# Patient Record
Sex: MALE | Race: WHITE | Employment: OTHER | URBAN - METROPOLITAN AREA
[De-identification: names, ages, dates, MRNs, and addresses within clinical notes are randomized per-mention and may not be internally consistent; named-entity substitution may affect disease eponyms.]

---

## 2019-06-02 ENCOUNTER — HOSPITAL ENCOUNTER (EMERGENCY)
Age: 69
Discharge: HOME | End: 2019-06-02
Attending: EMERGENCY MEDICINE
Payer: MEDICARE

## 2019-06-02 ENCOUNTER — APPOINTMENT (OUTPATIENT)
Dept: CT IMAGING | Age: 69
End: 2019-06-02
Payer: MEDICARE

## 2019-06-02 VITALS
RESPIRATION RATE: 18 BRPM | OXYGEN SATURATION: 98 % | DIASTOLIC BLOOD PRESSURE: 74 MMHG | SYSTOLIC BLOOD PRESSURE: 110 MMHG | HEART RATE: 74 BPM | TEMPERATURE: 98.3 F

## 2019-06-02 DIAGNOSIS — N17.9 AKI (ACUTE KIDNEY INJURY) (HCC): ICD-10-CM

## 2019-06-02 DIAGNOSIS — K40.30 INCARCERATED RIGHT INGUINAL HERNIA: Primary | ICD-10-CM

## 2019-06-02 DIAGNOSIS — E87.6 HYPOKALEMIA: ICD-10-CM

## 2019-06-02 LAB
ANION GAP SERPL CALCULATED.3IONS-SCNC: 16 MMOL/L (ref 3–16)
ANION GAP SERPL CALCULATED.3IONS-SCNC: 21 MMOL/L (ref 3–16)
BACTERIA: ABNORMAL /HPF
BASOPHILS ABSOLUTE: 0 K/UL (ref 0–0.2)
BASOPHILS RELATIVE PERCENT: 0.2 %
BILIRUBIN URINE: ABNORMAL
BLOOD, URINE: ABNORMAL
BUN BLDV-MCNC: 57 MG/DL (ref 7–20)
BUN BLDV-MCNC: 62 MG/DL (ref 7–20)
CALCIUM SERPL-MCNC: 8.6 MG/DL (ref 8.3–10.6)
CALCIUM SERPL-MCNC: 9.7 MG/DL (ref 8.3–10.6)
CASTS: ABNORMAL /LPF
CHLORIDE BLD-SCNC: 84 MMOL/L (ref 99–110)
CHLORIDE BLD-SCNC: 86 MMOL/L (ref 99–110)
CLARITY: CLEAR
CO2: 30 MMOL/L (ref 21–32)
CO2: 30 MMOL/L (ref 21–32)
COLOR: YELLOW
CREAT SERPL-MCNC: 1.7 MG/DL (ref 0.8–1.3)
CREAT SERPL-MCNC: 2.1 MG/DL (ref 0.8–1.3)
EOSINOPHILS ABSOLUTE: 0 K/UL (ref 0–0.6)
EOSINOPHILS RELATIVE PERCENT: 0.1 %
EPITHELIAL CELLS, UA: ABNORMAL /HPF
GFR AFRICAN AMERICAN: 38
GFR AFRICAN AMERICAN: 48
GFR NON-AFRICAN AMERICAN: 31
GFR NON-AFRICAN AMERICAN: 40
GLUCOSE BLD-MCNC: 128 MG/DL (ref 70–99)
GLUCOSE BLD-MCNC: 151 MG/DL (ref 70–99)
GLUCOSE URINE: NEGATIVE MG/DL
HCT VFR BLD CALC: 45.4 % (ref 40.5–52.5)
HEMOGLOBIN: 15.2 G/DL (ref 13.5–17.5)
KETONES, URINE: 15 MG/DL
LACTATE: 1.15 MMOL/L (ref 0.4–2)
LACTIC ACID: NORMAL MMOL/L (ref 0.4–2)
LEUKOCYTE ESTERASE, URINE: NEGATIVE
LYMPHOCYTES ABSOLUTE: 1.6 K/UL (ref 1–5.1)
LYMPHOCYTES RELATIVE PERCENT: 15.8 %
MCH RBC QN AUTO: 29.1 PG (ref 26–34)
MCHC RBC AUTO-ENTMCNC: 33.4 G/DL (ref 31–36)
MCV RBC AUTO: 87 FL (ref 80–100)
MICROSCOPIC EXAMINATION: YES
MONOCYTES ABSOLUTE: 1 K/UL (ref 0–1.3)
MONOCYTES RELATIVE PERCENT: 10.5 %
NEUTROPHILS ABSOLUTE: 7.3 K/UL (ref 1.7–7.7)
NEUTROPHILS RELATIVE PERCENT: 73.4 %
NITRITE, URINE: NEGATIVE
PDW BLD-RTO: 14.6 % (ref 12.4–15.4)
PERFORMED ON: NORMAL
PH UA: 5.5 (ref 5–8)
PLATELET # BLD: 487 K/UL (ref 135–450)
PMV BLD AUTO: 7.5 FL (ref 5–10.5)
POC SAMPLE TYPE: NORMAL
POTASSIUM SERPL-SCNC: 3 MMOL/L (ref 3.5–5.1)
POTASSIUM SERPL-SCNC: 3.1 MMOL/L (ref 3.5–5.1)
PROTEIN UA: 30 MG/DL
RBC # BLD: 5.21 M/UL (ref 4.2–5.9)
RBC UA: ABNORMAL /HPF (ref 0–2)
SODIUM BLD-SCNC: 132 MMOL/L (ref 136–145)
SODIUM BLD-SCNC: 135 MMOL/L (ref 136–145)
SPECIFIC GRAVITY UA: 1.02 (ref 1–1.03)
URINE TYPE: ABNORMAL
UROBILINOGEN, URINE: 0.2 E.U./DL
WBC # BLD: 9.9 K/UL (ref 4–11)
WBC UA: ABNORMAL /HPF (ref 0–5)

## 2019-06-02 PROCEDURE — 96374 THER/PROPH/DIAG INJ IV PUSH: CPT

## 2019-06-02 PROCEDURE — 80048 BASIC METABOLIC PNL TOTAL CA: CPT

## 2019-06-02 PROCEDURE — 81001 URINALYSIS AUTO W/SCOPE: CPT

## 2019-06-02 PROCEDURE — 74176 CT ABD & PELVIS W/O CONTRAST: CPT

## 2019-06-02 PROCEDURE — 6360000002 HC RX W HCPCS: Performed by: EMERGENCY MEDICINE

## 2019-06-02 PROCEDURE — 2580000003 HC RX 258: Performed by: EMERGENCY MEDICINE

## 2019-06-02 PROCEDURE — 85025 COMPLETE CBC W/AUTO DIFF WBC: CPT

## 2019-06-02 PROCEDURE — 99283 EMERGENCY DEPT VISIT LOW MDM: CPT

## 2019-06-02 PROCEDURE — 83605 ASSAY OF LACTIC ACID: CPT

## 2019-06-02 PROCEDURE — 6370000000 HC RX 637 (ALT 250 FOR IP): Performed by: EMERGENCY MEDICINE

## 2019-06-02 PROCEDURE — 6360000004 HC RX CONTRAST MEDICATION: Performed by: EMERGENCY MEDICINE

## 2019-06-02 RX ORDER — 0.9 % SODIUM CHLORIDE 0.9 %
500 INTRAVENOUS SOLUTION INTRAVENOUS ONCE
Status: COMPLETED | OUTPATIENT
Start: 2019-06-02 | End: 2019-06-02

## 2019-06-02 RX ORDER — MORPHINE SULFATE 4 MG/ML
4 INJECTION, SOLUTION INTRAMUSCULAR; INTRAVENOUS EVERY 10 MIN PRN
Status: DISCONTINUED | OUTPATIENT
Start: 2019-06-02 | End: 2019-06-02 | Stop reason: HOSPADM

## 2019-06-02 RX ORDER — POTASSIUM CHLORIDE 1.5 G/1.77G
40 POWDER, FOR SOLUTION ORAL 2 TIMES DAILY
Status: DISCONTINUED | OUTPATIENT
Start: 2019-06-02 | End: 2019-06-02 | Stop reason: HOSPADM

## 2019-06-02 RX ORDER — ONDANSETRON 2 MG/ML
4 INJECTION INTRAMUSCULAR; INTRAVENOUS
Status: COMPLETED | OUTPATIENT
Start: 2019-06-02 | End: 2019-06-02

## 2019-06-02 RX ADMIN — IOHEXOL 50 ML: 240 INJECTION, SOLUTION INTRATHECAL; INTRAVASCULAR; INTRAVENOUS; ORAL at 14:26

## 2019-06-02 RX ADMIN — ONDANSETRON 4 MG: 2 INJECTION INTRAMUSCULAR; INTRAVENOUS at 13:02

## 2019-06-02 RX ADMIN — POTASSIUM CHLORIDE 40 MEQ: 1.5 POWDER, FOR SOLUTION ORAL at 16:18

## 2019-06-02 RX ADMIN — SODIUM CHLORIDE 500 ML: 9 INJECTION, SOLUTION INTRAVENOUS at 13:02

## 2019-06-02 ASSESSMENT — ENCOUNTER SYMPTOMS
DIARRHEA: 0
BACK PAIN: 0
ABDOMINAL PAIN: 0
NAUSEA: 1
VOMITING: 1
SHORTNESS OF BREATH: 0
COUGH: 0
RHINORRHEA: 0

## 2019-06-02 NOTE — ED PROVIDER NOTES
0.1 %    Basophils % 0.2 %    Neutrophils # 7.3 1.7 - 7.7 K/uL    Lymphocytes # 1.6 1.0 - 5.1 K/uL    Monocytes # 1.0 0.0 - 1.3 K/uL    Eosinophils # 0.0 0.0 - 0.6 K/uL    Basophils # 0.0 0.0 - 0.2 K/uL   Basic Metabolic Panel   Result Value Ref Range    Sodium 135 (L) 136 - 145 mmol/L    Potassium 3.1 (L) 3.5 - 5.1 mmol/L    Chloride 84 (L) 99 - 110 mmol/L    CO2 30 21 - 32 mmol/L    Anion Gap 21 (H) 3 - 16    Glucose 151 (H) 70 - 99 mg/dL    BUN 62 (H) 7 - 20 mg/dL    CREATININE 2.1 (H) 0.8 - 1.3 mg/dL    GFR Non- 31 (A) >60    GFR  38 (A) >60    Calcium 9.7 8.3 - 10.6 mg/dL   Lactate, plasma   Result Value Ref Range    Lactic Acid see below 0.4 - 2.0 mmol/L   Urinalysis, reflex to microscopic (Lab)   Result Value Ref Range    Color, UA Yellow Straw/Yellow    Clarity, UA Clear Clear    Glucose, Ur Negative Negative mg/dL    Bilirubin Urine SMALL (A) Negative    Ketones, Urine 15 (A) Negative mg/dL    Specific Gravity, UA 1.025 1.005 - 1.030    Blood, Urine SMALL (A) Negative    pH, UA 5.5 5.0 - 8.0    Protein, UA 30 (A) Negative mg/dL    Urobilinogen, Urine 0.2 <2.0 E.U./dL    Nitrite, Urine Negative Negative    Leukocyte Esterase, Urine Negative Negative    Microscopic Examination YES     Urine Type Voided    Microscopic Urinalysis   Result Value Ref Range    Casts 3-5 Hyaline (A) /LPF    WBC, UA 3-5 0 - 5 /HPF    RBC, UA 3-5 (A) 0 - 2 /HPF    Epi Cells 0-2 /HPF    Bacteria, UA 2+ (A) /HPF   Basic Metabolic Panel   Result Value Ref Range    Sodium 132 (L) 136 - 145 mmol/L    Potassium 3.0 (L) 3.5 - 5.1 mmol/L    Chloride 86 (L) 99 - 110 mmol/L    CO2 30 21 - 32 mmol/L    Anion Gap 16 3 - 16    Glucose 128 (H) 70 - 99 mg/dL    BUN 57 (H) 7 - 20 mg/dL    CREATININE 1.7 (H) 0.8 - 1.3 mg/dL    GFR Non- 40 (A) >60    GFR  48 (A) >60    Calcium 8.6 8.3 - 10.6 mg/dL   POCT Venous   Result Value Ref Range    Lactate 1.15 0.40 - 2.00 mmol/L    Sample Type AFIA Performed on SEE BELOW        ED BEDSIDE ULTRASOUND:  N/A    RECENT VITALS:  BP: 123/73, Temp: 98.3 °F (36.8 °C), Pulse: 79, Resp: 20, SpO2: 99 %     Procedures     Hernia reduction as below    [address] / DEON[place] / Roxanne Umm is a 78 y.o. male with a history of R inguinal hernia who presents with groin pain/swelling. Symptoms and exam most consistent with incarcerated inguinal hernia. Other considerations include strangulation and SBO. Doubt other emergent condition. Plan: Labs as below, possible CT A/P, pain control, reduction attempt    ED Course     Nursing Notes, Past Medical Hx, Past Surgical Hx, Social Hx, Allergies, and Family Hx were reviewed. The patient was given the followingmedications:  Orders Placed This Encounter   Medications    morphine injection 4 mg    0.9 % sodium chloride bolus    ondansetron (ZOFRAN) injection 4 mg    iohexol (OMNIPAQUE 240) injection 50 mL    potassium chloride (KLOR-CON) packet 40 mEq       CONSULTS:  None    ED Course as of Jun 02 1548   Sun Jun 02, 2019   1138 Hernia reduced with gentle pressure after ice/Trendelenburg    [AZ]   1204 CBC unremarkable    [AZ]   1216 AQUILINO vs. undiagnosed CKD, if acute likely prerenal from vomiting   Creatinine(!): 2.1 [AZ]   1241 Neg   Lactate, ser/plas: 1.15 [AZ]   1450 Bilateral internal hernias right greater than left containing fat and fluid without bowel herniation/strangulation. Mild diverticulosis. Small hiatal hernia. Prostatomegaly. CT ABDOMEN PELVIS WO CONTRAST Additional Contrast? Oral [AZ]   1547 Improving   Creatinine(!): 1.7 [AZ]   1547 Will replete   Potassium(!): 3.0 [AZ]   1548 Patient remains well-appearing. Suspect incarcerated R inguinal hernia s/p reduction without e/o strangulation. Also with AQUILINO 2/2 dehydration. Will discharge with PCP/surgery follow-up.  Verbal and written discharge instructions given to and understood by patient.    Niki Wesley      ED Course User Index  [AZ] Delfino Wolf MD       Clinical Impression     1. Incarcerated right inguinal hernia    2. AQUILINO (acute kidney injury) ([facility] [address]    3.  Hypokalemia        Disposition     PATIENT REFERRED TO:  Nghia Beltran  [facility]  [phone]    Schedule an appointment as soon as possible for a visit in 1 week      The [facility] Emergency Department  [address] [place] [address]  [phone]  Go to   If symptoms worsen      DISCHARGEMEDICATIONS:  New Prescriptions    No medications on file       DISPOSITION       Delfino Wolf MD  06/02/19 1948

## 2019-06-02 NOTE — ED TRIAGE NOTES
Pt sent to ED by urgent care for hernia evaluation. Pt states the hernia started causing issues 4 days ago.

## 2019-06-02 NOTE — ED NOTES
Attempted to urinate, unable. States that he hasn't been able to keep anything down the last couple days.   Dr. Garay Figures notified and NS bolus started     Divya Lynch RN  06/02/19 6621

## 2019-06-10 ENCOUNTER — OFFICE VISIT (OUTPATIENT)
Dept: INTERNAL MEDICINE CLINIC | Age: 69
End: 2019-06-10
Payer: MEDICARE

## 2019-06-10 VITALS
RESPIRATION RATE: 20 BRPM | TEMPERATURE: 99 F | WEIGHT: 154 LBS | BODY MASS INDEX: 30.23 KG/M2 | HEART RATE: 66 BPM | HEIGHT: 60 IN | OXYGEN SATURATION: 99 % | SYSTOLIC BLOOD PRESSURE: 152 MMHG | DIASTOLIC BLOOD PRESSURE: 77 MMHG

## 2019-06-10 VITALS
TEMPERATURE: 99 F | DIASTOLIC BLOOD PRESSURE: 77 MMHG | OXYGEN SATURATION: 99 % | HEIGHT: 66 IN | SYSTOLIC BLOOD PRESSURE: 152 MMHG | BODY MASS INDEX: 24.78 KG/M2 | HEART RATE: 66 BPM | WEIGHT: 154.2 LBS | RESPIRATION RATE: 20 BRPM

## 2019-06-10 DIAGNOSIS — R79.89 ELEVATED SERUM CREATININE: Primary | ICD-10-CM

## 2019-06-10 DIAGNOSIS — Z01.818 PRE-OP EVALUATION: Primary | ICD-10-CM

## 2019-06-10 PROCEDURE — 99213 OFFICE O/P EST LOW 20 MIN: CPT | Performed by: STUDENT IN AN ORGANIZED HEALTH CARE EDUCATION/TRAINING PROGRAM

## 2019-06-10 ASSESSMENT — ENCOUNTER SYMPTOMS
VOMITING: 0
NAUSEA: 0
ABDOMINAL PAIN: 0
SINUS PAIN: 0
CHEST TIGHTNESS: 0
SINUS PRESSURE: 0
SORE THROAT: 0
SHORTNESS OF BREATH: 0
PHOTOPHOBIA: 0
BACK PAIN: 0

## 2019-06-10 NOTE — PROGRESS NOTES
Department of General Surgery - Adult  General Surgery Service  Resident Clinic Note      CC:  Bilateral inguinal hernia    History Obtained From:  patient    HISTORY OF PRESENT ILLNESS:  This is a 78 y.o. male with Hx of asthma and recently diagnosed bilateral inguinal hernias presenting to clinic for further evaluation and management of his hernias. Patient says that his hernias have been present for nearly 20 years. It has not really bothered him until a recent episode of possible obstruction/incarceration nearly 2 weeks ago. He says that he developed pain in the groin, followed by 3 days of nausea and emesis. He went to the ED where CT showed bilateral internal hernias R>L containing fat and fluid without bowel herniation/strangulation. Hernia was reduced during this visit. He has not developed any recurrence of his symptoms. Patient desires repair of inguinal hernia. Past Medical History:   Diagnosis Date    Asthma      No past surgical history on file. Current Outpatient Medications   Medication Sig Dispense Refill    aspirin 81 MG chewable tablet Take 81 mg by mouth daily      albuterol sulfate HFA (PROVENTIL HFA) 108 (90 BASE) MCG/ACT inhaler Inhale 2 puffs into the lungs every 4 hours as needed for Wheezing (2 puffs every 4 hours ×1 day, then as needed.) 1 Inhaler 0     No current facility-administered medications for this visit. No Known Allergies  Social History     Tobacco Use    Smoking status: Never Smoker    Smokeless tobacco: Never Used   Substance Use Topics    Alcohol use: Yes     Comment: occ     Drug use: No     No family history on file. REVIEW OF SYSTEMS:    A 10 point review of systems was conducted, significant findings as noted in HPI. All other systems negative.     PHYSICAL EXAM:    Vitals:    06/10/19 0853   BP: (!) 152/77   Pulse: 66   Resp: 20   Temp: 99 °F (37.2 °C)   SpO2: 99%   Weight: 154 lb 3.2 oz (69.9 kg)   Height: 5' 6\" (1.676 m)       General appearance: alert, no acute distress, grooming appropriate  Eyes: PERRLA, no scleral icterus  Neck: trachea midline, no JVD, no lymphadenopathy  Chest/Lungs: CTAB, no crackles/rales, wheezes/rhonchi, normal effort  Cardiovascular: RRR, no murmurs/gallops/rubs  Abdomen: soft, non-tender, non-distended, +BS, no guarding/rigidity  Inguinal: hernia present on right that is nontender to palpation, no evidence of skin color changes, and is easily reducible. Hernia also present on left with concurrent hydrocele present on transillumination exam  Skin: warm and dry, no rashes  Extremities: no edema, no cyanosis  Neuro: A&Ox3, no focal deficits, sensation intact    ASSESSMENT AND PLAN:  This is a 78 y.o. male with Hx of asthma and bilateral inguinal hernias with recent pain and obstructive episode. Patient also had hydrocele on left that will likely need evaluation with Urology after surgical repair.  Patient desires surgical repair.      - pre-op labs: CBC, CMP, INR/PT  - pre-op CXR, EKG  - Patient to f/u with internal medicine to set up PCP and obtain pre-operative medical risk stratification  - Discussed with Chief resident and Dr. Darlyn Tuttle MD, PGY-1  06/10/19  9:00 AM  [phone]

## 2019-06-10 NOTE — PATIENT INSTRUCTIONS
Get H&P today    Dr Jim Ewing office will call you with date and time of surgery    Preadmission testing will call you with instructions    You will follow up postoperatively in the clinic

## 2019-06-10 NOTE — PATIENT INSTRUCTIONS
Before any of you medication is completely gone, call your pharmacy AT LEAST 1 WEEK ahead for refills. Review all information regarding your medication before starting. If you become ill when the clinic is closed, please call the [facility]  at   #[phone] and ask the  to page the AOD between 6:00 AM and 6:00 PM or page the AON between 6:00 PM and 6:00 am    The clinic is not able to process MY CHART requests for appointments or messages including requests. Please call the [address] [place] [address] at [phone]  For appointments and messages. Call your pharmacy for medication refills. Return to clinic after surgery.  Call for appointment    Continue medication as listed on discharge sheet    Instructions reviewed before discharge and copy given to patient    [phone] [phone]

## 2019-06-10 NOTE — PROGRESS NOTES
Outpatient Note for New Patient Visit    Patient:  Robert Spaulding                                               :   Age: 78 y.o. MRN: [id number]  Date : 6/10/2019    History Obtained From:  patient    CHIEF COMPLAINT:  Pre-Operative Evaluation    HISTORY OF PRESENT ILLNESS:   The patient is a 78 y.o. male who presents after ED visit where he was found to have bilateral inguinal hernias along with elevated creatinine and hypokalemia. He was sent here for preoperative risk stratification. 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 He has no other symptoms including fever, chills, CP, SOB, nausea, vomiting, leg pain/swelling. Past Medical History:        Diagnosis Date    Asthma        Past Surgical History:    No past surgical history on file. Family History:   No family history on file. SocialHistory:   TOBACCO:   reports that he has never smoked. He has never used smokeless tobacco.  ETOH:   reports that he drinks alcohol. OCCUPATION:      Allergies: Patient has no known allergies. Current Medications:    Prior to Admission medications    Medication Sig Start Date End Date Taking? Authorizing Provider   aspirin 81 MG chewable tablet Take 81 mg by mouth daily   Yes Historical Provider, MD   albuterol sulfate HFA (PROVENTIL HFA) 108 (90 BASE) MCG/ACT inhaler Inhale 2 puffs into the lungs every 4 hours as needed for Wheezing (2 puffs every 4 hours ×1 day, then as needed.) 16  Yes [place] Pill, APRN - CNP       REVIEW OF SYSTEMS:  Review of Systems   Constitutional: Negative for chills, fatigue and fever. HENT: Negative for congestion, sinus pressure, sinus pain and sore throat. Eyes: Negative for photophobia and visual disturbance. Respiratory: Negative for chest tightness and shortness of breath. Cardiovascular: Negative for chest pain and palpitations. Gastrointestinal: Negative for abdominal pain, nausea and vomiting.

## 2019-06-10 NOTE — PROGRESS NOTES
[address] [place] [address]       NURSING PROGRESS NOTE      Sandy 10, 2019  Tomeka Whatley    Chief Complaint:   Chief Complaint   Patient presents with    H&P       Constitutional: negative  Eyes: negative  Ears, nose, mouth, throat, and face: negative  Respiratory: positive for asthma  Cardiovascular: negative  Gastrointestinal: negative, hernia  Genitourinary: negative  Integument/breast: negative  Hematologic/lymphatic: negative  Musculoskeletal: negative  Neurological: negative  Diabetes: No    Pain Assessment:  Pain Present: no      Mobility/Safety/Self-Care:  Steady Gait: Yes  History of Falls: No   History of a Fall within the last 30 days No  Assistive Device: Straight Cane  Poor Hygiene: No    Psychosocial:   Depression: No  If YES,    Does Patient express current thoughts of harming self or others?: No  Anxious: No  Insomnia: No  Inappropriate Behavior: No  Alcohol Abuse: no  Substance Abuse: no  Signs of Physical Abuse: No  Signs of Emotional Abuse: No    Educational:  Identify the learner who is being assessed for education:  patient                    Ability to Learn:  Exhibits ability to grasp concepts and respond to questions: Medium  Ready to Learn: Yes  calm   Preferred Method of Learning:  written  Barriers to Learning: Verbalizes interest  Special Considerations due to cultural, Amish, spiritual beliefs:  No  Language:  English  :  No    Note:   Here for pre-op H&P      1:44 PM 6/10/2019

## 2019-06-10 NOTE — PROGRESS NOTES
Surgery Nurse Note      Sandy 10, 2019  Mery Edmond    Reason for Visit: Right inguinal hernia  Post Op: No  Date of Surgery:    Wound Status: not applicable    Pain Assessment:  Pain Present: yes went hernia is not redced  Pain Score: 8  Pain Quality/Description: Pressure  Educational:  Identify the learner who is being assessed for education:  patient                    Ability to Learn:  Exhibits ability to grasp concepts and respond to questions: Medium  Ready to Learn: Yes  calm   Preferred Method of Learning:  written  Barriers to Learning: Verbalizes interest  Special Considerations due to cultural, Gnosticism, spiritual beliefs:  No  Language:  English  :  No    Note:   Has has hernia for 20 years.  Has recently become problematic      Leeroy Rosenbaum

## 2019-06-11 ENCOUNTER — HOSPITAL ENCOUNTER (OUTPATIENT)
Dept: GENERAL RADIOLOGY | Age: 69
Discharge: HOME | End: 2019-06-11
Payer: MEDICARE

## 2019-06-11 ENCOUNTER — HOSPITAL ENCOUNTER (OUTPATIENT)
Age: 69
Discharge: HOME | End: 2019-06-11
Payer: MEDICARE

## 2019-06-11 DIAGNOSIS — Z01.818 PRE-OP EVALUATION: ICD-10-CM

## 2019-06-11 LAB
A/G RATIO: 1.5 (ref 1.1–2.2)
ALBUMIN SERPL-MCNC: 3.7 G/DL (ref 3.4–5)
ALP BLD-CCNC: 77 U/L (ref 40–129)
ALT SERPL-CCNC: 11 U/L (ref 10–40)
ANION GAP SERPL CALCULATED.3IONS-SCNC: 12 MMOL/L (ref 3–16)
AST SERPL-CCNC: 14 U/L (ref 15–37)
BILIRUB SERPL-MCNC: <0.2 MG/DL (ref 0–1)
BUN BLDV-MCNC: 10 MG/DL (ref 7–20)
CALCIUM SERPL-MCNC: 9.4 MG/DL (ref 8.3–10.6)
CHLORIDE BLD-SCNC: 102 MMOL/L (ref 99–110)
CO2: 26 MMOL/L (ref 21–32)
CREAT SERPL-MCNC: 0.8 MG/DL (ref 0.8–1.3)
EKG ATRIAL RATE: 55 BPM
EKG DIAGNOSIS: NORMAL
EKG P AXIS: 68 DEGREES
EKG P-R INTERVAL: 158 MS
EKG Q-T INTERVAL: 456 MS
EKG QRS DURATION: 92 MS
EKG QTC CALCULATION (BAZETT): 436 MS
EKG R AXIS: 6 DEGREES
EKG T AXIS: 60 DEGREES
EKG VENTRICULAR RATE: 55 BPM
GFR AFRICAN AMERICAN: >60
GFR NON-AFRICAN AMERICAN: >60
GLOBULIN: 2.4 G/DL
GLUCOSE BLD-MCNC: 101 MG/DL (ref 70–99)
HCT VFR BLD CALC: 40.1 % (ref 40.5–52.5)
HEMOGLOBIN: 13.3 G/DL (ref 13.5–17.5)
MCH RBC QN AUTO: 30.1 PG (ref 26–34)
MCHC RBC AUTO-ENTMCNC: 33.2 G/DL (ref 31–36)
MCV RBC AUTO: 90.6 FL (ref 80–100)
PDW BLD-RTO: 15.1 % (ref 12.4–15.4)
PLATELET # BLD: 494 K/UL (ref 135–450)
PMV BLD AUTO: 7.2 FL (ref 5–10.5)
POTASSIUM SERPL-SCNC: 4.4 MMOL/L (ref 3.5–5.1)
RBC # BLD: 4.43 M/UL (ref 4.2–5.9)
SODIUM BLD-SCNC: 140 MMOL/L (ref 136–145)
TOTAL PROTEIN: 6.1 G/DL (ref 6.4–8.2)
WBC # BLD: 8.6 K/UL (ref 4–11)

## 2019-06-11 PROCEDURE — 71046 X-RAY EXAM CHEST 2 VIEWS: CPT

## 2019-06-11 PROCEDURE — 93010 ELECTROCARDIOGRAM REPORT: CPT | Performed by: INTERNAL MEDICINE

## 2019-06-11 PROCEDURE — 93005 ELECTROCARDIOGRAM TRACING: CPT | Performed by: STUDENT IN AN ORGANIZED HEALTH CARE EDUCATION/TRAINING PROGRAM

## 2019-06-14 ENCOUNTER — TELEPHONE (OUTPATIENT)
Dept: SURGERY | Age: 69
End: 2019-06-14

## 2019-06-14 NOTE — TELEPHONE ENCOUNTER
Left message on patient's VM for a call back to schedule surgery. Patient called to schedule surgery for THURS 6/20/19 to arrive @ 11:00am main entrance of [facility]. Gave all verbal instructions & to follow up postoperatively with surgery clinic as well as any post op questions or medication refill to call surgery clinic. Patient had x-ray and medical clearance. Medications to be stopped 5 days before surgery: aspirin, patient is aware.

## 2019-06-17 NOTE — PROGRESS NOTES
hospitalization, the order may or may not be in effect during this procedure. I give my doctor permission to give me blood or blood products. I understand that there are risks with receiving blood such as hepatitis, AIDS, fever, or allergic reaction. I acknowledge that the risks, benefits, and alternatives of this treatment have been explained to me and that no express or implied warranty has been given by the hospital, any blood bank, or any person or entity as to the blood or blood components transfused. At the discretion of my doctor, I agree to allow observers, equipment/product representatives and allow photographing, and/or televising of the procedure, provided my name or identity is maintained confidentially. I agree the hospital may dispose of or use for scientific or educational purposes any tissue, fluid, or body parts which may be removed.     ________________________________Date________Time______ am/pm  ([place] [facility])  Patient or Signature of Closest Relative or Legal Guardian    ________________________________Date________Time______am/pm      Page 1 of  1  Witness

## 2019-06-19 ENCOUNTER — ANESTHESIA EVENT (OUTPATIENT)
Dept: OPERATING ROOM | Age: 69
End: 2019-06-19
Payer: MEDICARE

## 2019-06-19 NOTE — PROGRESS NOTES
[address]                          Date of Procedure 6/20 Time of Procedure 1300    PRIOR TO PROCEDURE DATE:  1. Please follow any guidelines/instructions prior to your procedure as advised by your surgeon. 2. Arrange for someone to drive you home and be with you for the first 24 hours after discharge for your safety after your procedure for which you received sedation. Ensure it is someone we can share information with regarding your discharge. 3. You must contact your surgeon for instructions IF:   You are taking any blood thinners, aspirin, anti-inflammatory or vitamin E.   There is a change in your physical condition such as a cold, fever, rash, cuts, sores or any other infection, especially near your surgical site. 4. Do not drink alcohol the day before or day of your procedure. 5. A Pre-op History and Physical for surgery MUST be completed by your Physician or Urgent Care within 30 days of your procedure date. Please bring a copy with you on the day of your procedure and along with any other testing performed. THE DAY OF YOUR PROCEDURE:  1. Follow instructions for ARRIVAL TIME as DIRECTED BY YOUR SURGEON. If your surgeon does not give you a specific arrival time, please arrive at  [address] [place] [address] the MAIN entrance from [employer] and follow the signs to the free [employer] or [employer] parking (offered free of charge 6am-5pm). 3. Enter the Main Entrance of the hospital (do not enter from the lower level of the parking garage). Upon entrance, check in with the  at the main desk on your left. If no one is available at the desk, proceed into the [facility] Waiting Room and go through the door directly into the [facility]. There is a Check-in desk ACROSS from Room 5 (marked with a sign hanging from the ceiling). The phone number for the surgery center is [phone].     4. Please call [phone] option #2 option #2 if you have not been preregistered yet. On the day of your procedure bring your insurance card and photo ID. You will be registered at your bedside once brought back to your room. 5. DO NOT EAT ANYTHING eight hours prior to surgery. May have 8 ounces of water 4 hours prior to surgery. 6. MEDICATIONS    Take the following medications with a SMALL sip of water:    Use your usual dose of inhalers the morning of surgery. BRING your rescue inhaler with you to hospital.    Anesthesia does NOT want you to take insulin the morning of surgery. They will control your blood sugar while you are at the hospital. Please contact your ordering physician for instructions regarding your insulin the night before your procedure. If you have an insulin pump, please keep it set on basal rate. 7. Do not swallow water when brushing teeth. No gum, candy, mints or ice chips. Refrain from smoking or at least decrease the amount. 8. Dress in loose, comfortable clothing appropriate for redressing after your procedure. Do not wear jewelry (including body piercings), make-up (especially NO eye make-up), fingernail polish (NO toenail polish if foot/leg surgery), lotion, powders or metal hairclips. 9. Dentures, glasses, or contacts will need to be removed before your procedure. Bring cases for your glasses, contacts, dentures, or hearing aids to protect them while you are in surgery. 10. If you use a CPAP, please bring it with you on the day of your procedure. 11. We recommend that valuable personal  belongings such as cash, cell phones, e-tablets or jewelry, be left at home during your stay. The hospital will not be responsible for valuables that are not secured in the hospital safe. However, if your insurance requires a co-pay, you may want to bring a method of payment, i.e. Check or credit card, if you wish to pay your co-pay the day of surgery.       12. If you are to stay overnight, you may bring a bag with personal items. Please have any large items you may need brought in by your family after your arrival to your hospital room. 15. If you have a Living Will or Durable Power of , please bring a copy on the day of your procedure. 15. With your permission, one family member may accompany you while you are being prepared for surgery. Once you are ready, additional family members may join you. HOW WE KEEP YOU SAFE and WORK TO PREVENT SURGICAL SITE INFECTIONS:  1. Health care workers should always check your ID bracelet to verify your name and birth date. You will be asked many times to state your name, date of birth, and allergies. 2. Health care workers should always clean their hands with soap or alcohol gel before providing care to you. It is okay to ask anyone if they cleaned their hands before they touch you. 3. You will be actively involved in verifying the type of procedure you are having and ensuring the correct surgical site. This will be confirmed multiple times prior to your procedure. Do NOT clint your surgery site UNLESS instructed to by your surgeon. 4. Do not shave or wax for 72 hours prior to procedure near your operative site. Shaving with a razor can irritate your skin and make it easier to develop an infection. On the day of your procedure, any hair that needs to be removed near the surgical site will be clipped by a healthcare worker using a special clippers designed to avoid skin irritation. 5. When you are in the operating room, your surgical site will be cleansed with a special soap, and in most cases, you will be given an antibiotic before the surgery begins. What to expect AFTER YOUR PROCEDURE:  1. Immediately following your procedure, your will be taken to the PACU for the first phase of your recovery.   Your nurse will help you recover from any potential side effects of anesthesia, such as extreme drowsiness, changes in your vital signs or breathing patterns. Nausea, headache, muscle aches, or sore throat may also occur after anesthesia. Your nurse will help you manage these potential side effects. 2. For comfort and safety, arrange to have someone at home with you for the first 24 hours after discharge. 3. You and your family will be given written instructions about your diet, activity, dressing care, medications, and return visits. 4. Once at home, should issues with nausea, pain, or bleeding occur, or should you notice any signs of infection, you should call your surgeon. 5. Always clean your hands before and after caring for your wound. Do not let your family touch your surgery site without cleaning their hands. 6. Narcotic pain medications can cause significant constipation. You may want to add a stool softener to your postoperative medication schedule or speak to your surgeon on how best to manage this SIDE EFFECT. SPECIAL INSTRUCTIONS     Thank you for allowing us to care for you. We strive to exceed your expectations in the delivery of care and service provided to you and your family. If you need to contact us for any reason, please call us at [phone]    Instructions reviewed with patient during preadmission testing phone interview. Melisa Mendes. 6/19/2019 .8:32 AM      ADDITIONAL EDUCATIONAL INFORMATION REVIEWED PER PHONE WITH YOU AND/OR YOUR FAMILY:    Yes Antibacterial Soap

## 2019-06-20 ENCOUNTER — ANESTHESIA (OUTPATIENT)
Dept: OPERATING ROOM | Age: 69
End: 2019-06-20
Payer: MEDICARE

## 2019-06-20 ENCOUNTER — HOSPITAL ENCOUNTER (OUTPATIENT)
Age: 69
Setting detail: OUTPATIENT SURGERY
Discharge: HOME | End: 2019-06-20
Attending: SURGERY | Admitting: SURGERY
Payer: MEDICARE

## 2019-06-20 VITALS
DIASTOLIC BLOOD PRESSURE: 77 MMHG | OXYGEN SATURATION: 100 % | RESPIRATION RATE: 19 BRPM | TEMPERATURE: 95.7 F | SYSTOLIC BLOOD PRESSURE: 135 MMHG

## 2019-06-20 VITALS
DIASTOLIC BLOOD PRESSURE: 78 MMHG | WEIGHT: 154 LBS | SYSTOLIC BLOOD PRESSURE: 154 MMHG | HEIGHT: 66 IN | OXYGEN SATURATION: 98 % | BODY MASS INDEX: 24.75 KG/M2 | HEART RATE: 84 BPM | TEMPERATURE: 97.5 F | RESPIRATION RATE: 16 BRPM

## 2019-06-20 DIAGNOSIS — K40.20 NON-RECURRENT BILATERAL INGUINAL HERNIA WITHOUT OBSTRUCTION OR GANGRENE: Primary | ICD-10-CM

## 2019-06-20 LAB
APTT: 32.2 SEC (ref 26–36)
INR BLD: 0.96 (ref 0.86–1.14)
PROTHROMBIN TIME: 11 SEC (ref 9.8–13)

## 2019-06-20 PROCEDURE — 7100000000 HC PACU RECOVERY - FIRST 15 MIN: Performed by: SURGERY

## 2019-06-20 PROCEDURE — 2580000003 HC RX 258: Performed by: SURGERY

## 2019-06-20 PROCEDURE — 3700000000 HC ANESTHESIA ATTENDED CARE: Performed by: SURGERY

## 2019-06-20 PROCEDURE — 6360000002 HC RX W HCPCS: Performed by: NURSE ANESTHETIST, CERTIFIED REGISTERED

## 2019-06-20 PROCEDURE — 6360000002 HC RX W HCPCS: Performed by: SURGERY

## 2019-06-20 PROCEDURE — 85730 THROMBOPLASTIN TIME PARTIAL: CPT

## 2019-06-20 PROCEDURE — 7100000001 HC PACU RECOVERY - ADDTL 15 MIN: Performed by: SURGERY

## 2019-06-20 PROCEDURE — 6360000002 HC RX W HCPCS: Performed by: ANESTHESIOLOGY

## 2019-06-20 PROCEDURE — 2580000003 HC RX 258: Performed by: ANESTHESIOLOGY

## 2019-06-20 PROCEDURE — 85610 PROTHROMBIN TIME: CPT

## 2019-06-20 PROCEDURE — 3700000001 HC ADD 15 MINUTES (ANESTHESIA): Performed by: SURGERY

## 2019-06-20 PROCEDURE — 3600000014 HC SURGERY LEVEL 4 ADDTL 15MIN: Performed by: SURGERY

## 2019-06-20 PROCEDURE — 2709999900 HC NON-CHARGEABLE SUPPLY: Performed by: SURGERY

## 2019-06-20 PROCEDURE — C1781 MESH (IMPLANTABLE): HCPCS | Performed by: SURGERY

## 2019-06-20 PROCEDURE — 2500000003 HC RX 250 WO HCPCS: Performed by: NURSE ANESTHETIST, CERTIFIED REGISTERED

## 2019-06-20 PROCEDURE — 3600000004 HC SURGERY LEVEL 4 BASE: Performed by: SURGERY

## 2019-06-20 PROCEDURE — 6360000002 HC RX W HCPCS

## 2019-06-20 PROCEDURE — 49650 LAP ING HERNIA REPAIR INIT: CPT | Performed by: SURGERY

## 2019-06-20 PROCEDURE — C1713 ANCHOR/SCREW BN/BN,TIS/BN: HCPCS | Performed by: SURGERY

## 2019-06-20 PROCEDURE — 2500000003 HC RX 250 WO HCPCS: Performed by: SURGERY

## 2019-06-20 DEVICE — MESH HERN L W10.8XL16CM R INGUINAL WHT POLYPR MFIL: Type: IMPLANTABLE DEVICE | Site: ABDOMEN | Status: FUNCTIONAL

## 2019-06-20 DEVICE — MESH HERN L W10.8XL16CM L INGUINAL WHT POLYPR MFIL: Type: IMPLANTABLE DEVICE | Site: ABDOMEN | Status: FUNCTIONAL

## 2019-06-20 RX ORDER — MAGNESIUM HYDROXIDE 1200 MG/15ML
LIQUID ORAL CONTINUOUS PRN
Status: COMPLETED | OUTPATIENT
Start: 2019-06-20 | End: 2019-06-20

## 2019-06-20 RX ORDER — ONDANSETRON 2 MG/ML
INJECTION INTRAMUSCULAR; INTRAVENOUS PRN
Status: DISCONTINUED | OUTPATIENT
Start: 2019-06-20 | End: 2019-06-20 | Stop reason: SDUPTHER

## 2019-06-20 RX ORDER — ONDANSETRON 2 MG/ML
4 INJECTION INTRAMUSCULAR; INTRAVENOUS
Status: DISCONTINUED | OUTPATIENT
Start: 2019-06-20 | End: 2019-06-20 | Stop reason: HOSPADM

## 2019-06-20 RX ORDER — SODIUM CHLORIDE, SODIUM LACTATE, POTASSIUM CHLORIDE, CALCIUM CHLORIDE 600; 310; 30; 20 MG/100ML; MG/100ML; MG/100ML; MG/100ML
INJECTION, SOLUTION INTRAVENOUS CONTINUOUS
Status: DISCONTINUED | OUTPATIENT
Start: 2019-06-20 | End: 2019-06-20 | Stop reason: HOSPADM

## 2019-06-20 RX ORDER — GLYCOPYRROLATE 1 MG/5 ML
SYRINGE (ML) INTRAVENOUS PRN
Status: DISCONTINUED | OUTPATIENT
Start: 2019-06-20 | End: 2019-06-20 | Stop reason: SDUPTHER

## 2019-06-20 RX ORDER — PROPOFOL 10 MG/ML
INJECTION, EMULSION INTRAVENOUS PRN
Status: DISCONTINUED | OUTPATIENT
Start: 2019-06-20 | End: 2019-06-20 | Stop reason: SDUPTHER

## 2019-06-20 RX ORDER — FENTANYL CITRATE 50 UG/ML
50 INJECTION, SOLUTION INTRAMUSCULAR; INTRAVENOUS EVERY 5 MIN PRN
Status: DISCONTINUED | OUTPATIENT
Start: 2019-06-20 | End: 2019-06-20 | Stop reason: HOSPADM

## 2019-06-20 RX ORDER — LIDOCAINE HYDROCHLORIDE 20 MG/ML
INJECTION, SOLUTION INTRAVENOUS PRN
Status: DISCONTINUED | OUTPATIENT
Start: 2019-06-20 | End: 2019-06-20 | Stop reason: SDUPTHER

## 2019-06-20 RX ORDER — FENTANYL CITRATE 50 UG/ML
INJECTION, SOLUTION INTRAMUSCULAR; INTRAVENOUS PRN
Status: DISCONTINUED | OUTPATIENT
Start: 2019-06-20 | End: 2019-06-20 | Stop reason: SDUPTHER

## 2019-06-20 RX ORDER — MIDAZOLAM HYDROCHLORIDE 1 MG/ML
INJECTION INTRAMUSCULAR; INTRAVENOUS PRN
Status: DISCONTINUED | OUTPATIENT
Start: 2019-06-20 | End: 2019-06-20 | Stop reason: SDUPTHER

## 2019-06-20 RX ORDER — OXYCODONE HYDROCHLORIDE 5 MG/1
5 TABLET ORAL EVERY 6 HOURS PRN
Qty: 28 TABLET | Refills: 0 | Status: SHIPPED | OUTPATIENT
Start: 2019-06-20 | End: 2019-06-27

## 2019-06-20 RX ORDER — FENTANYL CITRATE 50 UG/ML
25 INJECTION, SOLUTION INTRAMUSCULAR; INTRAVENOUS EVERY 5 MIN PRN
Status: DISCONTINUED | OUTPATIENT
Start: 2019-06-20 | End: 2019-06-20 | Stop reason: HOSPADM

## 2019-06-20 RX ORDER — BUPIVACAINE HYDROCHLORIDE 5 MG/ML
INJECTION, SOLUTION EPIDURAL; INTRACAUDAL PRN
Status: DISCONTINUED | OUTPATIENT
Start: 2019-06-20 | End: 2019-06-20 | Stop reason: ALTCHOICE

## 2019-06-20 RX ORDER — ROCURONIUM BROMIDE 10 MG/ML
INJECTION, SOLUTION INTRAVENOUS PRN
Status: DISCONTINUED | OUTPATIENT
Start: 2019-06-20 | End: 2019-06-20 | Stop reason: SDUPTHER

## 2019-06-20 RX ADMIN — HYDROMORPHONE HYDROCHLORIDE 0.25 MG: 1 INJECTION, SOLUTION INTRAMUSCULAR; INTRAVENOUS; SUBCUTANEOUS at 15:26

## 2019-06-20 RX ADMIN — SODIUM CHLORIDE, SODIUM LACTATE, POTASSIUM CHLORIDE, AND CALCIUM CHLORIDE: 600; 310; 30; 20 INJECTION, SOLUTION INTRAVENOUS at 11:37

## 2019-06-20 RX ADMIN — SUGAMMADEX 140 MG: 100 INJECTION, SOLUTION INTRAVENOUS at 14:45

## 2019-06-20 RX ADMIN — HYDROMORPHONE HYDROCHLORIDE 0.25 MG: 1 INJECTION, SOLUTION INTRAMUSCULAR; INTRAVENOUS; SUBCUTANEOUS at 15:21

## 2019-06-20 RX ADMIN — MIDAZOLAM HYDROCHLORIDE 0.25 MG: 2 INJECTION, SOLUTION INTRAMUSCULAR; INTRAVENOUS at 13:01

## 2019-06-20 RX ADMIN — SODIUM CHLORIDE, SODIUM LACTATE, POTASSIUM CHLORIDE, AND CALCIUM CHLORIDE: 600; 310; 30; 20 INJECTION, SOLUTION INTRAVENOUS at 13:28

## 2019-06-20 RX ADMIN — LIDOCAINE HYDROCHLORIDE 100 MG: 20 INJECTION, SOLUTION INTRAVENOUS at 13:03

## 2019-06-20 RX ADMIN — ONDANSETRON 4 MG: 2 INJECTION INTRAMUSCULAR; INTRAVENOUS at 13:03

## 2019-06-20 RX ADMIN — CEFAZOLIN SODIUM 2 G: 1 INJECTION, POWDER, FOR SOLUTION INTRAMUSCULAR; INTRAVENOUS at 13:01

## 2019-06-20 RX ADMIN — FAMOTIDINE 20 MG: 10 INJECTION, SOLUTION INTRAVENOUS at 13:03

## 2019-06-20 RX ADMIN — PROPOFOL 150 MG: 10 INJECTION, EMULSION INTRAVENOUS at 13:03

## 2019-06-20 RX ADMIN — ROCURONIUM BROMIDE 50 MG: 10 INJECTION, SOLUTION INTRAVENOUS at 13:03

## 2019-06-20 RX ADMIN — FENTANYL CITRATE 25 MCG: 50 INJECTION INTRAMUSCULAR; INTRAVENOUS at 13:25

## 2019-06-20 RX ADMIN — HYDROMORPHONE HYDROCHLORIDE 0.25 MG: 1 INJECTION, SOLUTION INTRAMUSCULAR; INTRAVENOUS; SUBCUTANEOUS at 15:38

## 2019-06-20 RX ADMIN — HYDROMORPHONE HYDROCHLORIDE 0.25 MG: 1 INJECTION, SOLUTION INTRAMUSCULAR; INTRAVENOUS; SUBCUTANEOUS at 15:41

## 2019-06-20 RX ADMIN — Medication 0.2 MG: at 13:03

## 2019-06-20 RX ADMIN — FENTANYL CITRATE 25 MCG: 50 INJECTION INTRAMUSCULAR; INTRAVENOUS at 13:17

## 2019-06-20 RX ADMIN — FENTANYL CITRATE 50 MCG: 50 INJECTION INTRAMUSCULAR; INTRAVENOUS at 13:03

## 2019-06-20 RX ADMIN — ROCURONIUM BROMIDE 20 MG: 10 INJECTION, SOLUTION INTRAVENOUS at 13:50

## 2019-06-20 ASSESSMENT — PULMONARY FUNCTION TESTS
PIF_VALUE: 20
PIF_VALUE: 23
PIF_VALUE: 22
PIF_VALUE: 23
PIF_VALUE: 26
PIF_VALUE: 26
PIF_VALUE: 24
PIF_VALUE: 23
PIF_VALUE: 27
PIF_VALUE: 23
PIF_VALUE: 20
PIF_VALUE: 24
PIF_VALUE: 18
PIF_VALUE: 27
PIF_VALUE: 26
PIF_VALUE: 27
PIF_VALUE: 16
PIF_VALUE: 21
PIF_VALUE: 24
PIF_VALUE: 22
PIF_VALUE: 23
PIF_VALUE: 25
PIF_VALUE: 18
PIF_VALUE: 23
PIF_VALUE: 25
PIF_VALUE: 5
PIF_VALUE: 0
PIF_VALUE: 23
PIF_VALUE: 6
PIF_VALUE: 26
PIF_VALUE: 25
PIF_VALUE: 25
PIF_VALUE: 23
PIF_VALUE: 10
PIF_VALUE: 23
PIF_VALUE: 23
PIF_VALUE: 28
PIF_VALUE: 24
PIF_VALUE: 14
PIF_VALUE: 23
PIF_VALUE: 25
PIF_VALUE: 23
PIF_VALUE: 24
PIF_VALUE: 23
PIF_VALUE: 23
PIF_VALUE: 25
PIF_VALUE: 25
PIF_VALUE: 1
PIF_VALUE: 23
PIF_VALUE: 27
PIF_VALUE: 22
PIF_VALUE: 23
PIF_VALUE: 24
PIF_VALUE: 23
PIF_VALUE: 21
PIF_VALUE: 1
PIF_VALUE: 26
PIF_VALUE: 23
PIF_VALUE: 23
PIF_VALUE: 19
PIF_VALUE: 25
PIF_VALUE: 22
PIF_VALUE: 0
PIF_VALUE: 20
PIF_VALUE: 16
PIF_VALUE: 23
PIF_VALUE: 25
PIF_VALUE: 23
PIF_VALUE: 10
PIF_VALUE: 14
PIF_VALUE: 23
PIF_VALUE: 25
PIF_VALUE: 23
PIF_VALUE: 23
PIF_VALUE: 26
PIF_VALUE: 23
PIF_VALUE: 25
PIF_VALUE: 17
PIF_VALUE: 23
PIF_VALUE: 23
PIF_VALUE: 17
PIF_VALUE: 22
PIF_VALUE: 7
PIF_VALUE: 26
PIF_VALUE: 24
PIF_VALUE: 27
PIF_VALUE: 23
PIF_VALUE: 27
PIF_VALUE: 23
PIF_VALUE: 18
PIF_VALUE: 2
PIF_VALUE: 15
PIF_VALUE: 23
PIF_VALUE: 21
PIF_VALUE: 23
PIF_VALUE: 28
PIF_VALUE: 21
PIF_VALUE: 25
PIF_VALUE: 25
PIF_VALUE: 8
PIF_VALUE: 3
PIF_VALUE: 24
PIF_VALUE: 16
PIF_VALUE: 27
PIF_VALUE: 23
PIF_VALUE: 16
PIF_VALUE: 23
PIF_VALUE: 27
PIF_VALUE: 25
PIF_VALUE: 23
PIF_VALUE: 27
PIF_VALUE: 26

## 2019-06-20 ASSESSMENT — PAIN SCALES - GENERAL
PAINLEVEL_OUTOF10: 5
PAINLEVEL_OUTOF10: 2
PAINLEVEL_OUTOF10: 5
PAINLEVEL_OUTOF10: 4
PAINLEVEL_OUTOF10: 5
PAINLEVEL_OUTOF10: 2
PAINLEVEL_OUTOF10: 0
PAINLEVEL_OUTOF10: 5
PAINLEVEL_OUTOF10: 0
PAINLEVEL_OUTOF10: 2
PAINLEVEL_OUTOF10: 5
PAINLEVEL_OUTOF10: 5
PAINLEVEL_OUTOF10: 0
PAINLEVEL_OUTOF10: 2
PAINLEVEL_OUTOF10: 2

## 2019-06-20 ASSESSMENT — PAIN DESCRIPTION - PAIN TYPE
TYPE: SURGICAL PAIN

## 2019-06-20 ASSESSMENT — PAIN DESCRIPTION - DESCRIPTORS
DESCRIPTORS: SORE

## 2019-06-20 ASSESSMENT — PAIN DESCRIPTION - FREQUENCY
FREQUENCY: CONTINUOUS

## 2019-06-20 ASSESSMENT — PAIN DESCRIPTION - LOCATION
LOCATION: ABDOMEN

## 2019-06-20 ASSESSMENT — PAIN - FUNCTIONAL ASSESSMENT: PAIN_FUNCTIONAL_ASSESSMENT: 0-10

## 2019-06-20 NOTE — ANESTHESIA PRE PROCEDURE
BP Readings from Last 3 Encounters:   06/20/19 (!) 152/81   06/10/19 (!) 152/77   06/10/19 (!) 152/77       NPO Status:                                                                                 BMI:   Wt Readings from Last 3 Encounters:   06/20/19 154 lb (69.9 kg)   06/10/19 154 lb (69.9 kg)   06/10/19 154 lb 3.2 oz (69.9 kg)     Body mass index is 24.86 kg/m². CBC:   Lab Results   Component Value Date    WBC 8.6 06/11/2019    RBC 4.43 06/11/2019    HGB 13.3 06/11/2019    HCT 40.1 06/11/2019    MCV 90.6 06/11/2019    RDW 15.1 06/11/2019     06/11/2019       CMP:   Lab Results   Component Value Date     06/11/2019    K 4.4 06/11/2019     06/11/2019    CO2 26 06/11/2019    BUN 10 06/11/2019    CREATININE 0.8 06/11/2019    GFRAA >60 06/11/2019    AGRATIO 1.5 06/11/2019    LABGLOM >60 06/11/2019    GLUCOSE 101 06/11/2019    PROT 6.1 06/11/2019    CALCIUM 9.4 06/11/2019    BILITOT <0.2 06/11/2019    ALKPHOS 77 06/11/2019    AST 14 06/11/2019    ALT 11 06/11/2019       POC Tests: No results for input(s): POCGLU, POCNA, POCK, POCCL, POCBUN, POCHEMO, POCHCT in the last 72 hours.     Coags: No results found for: PROTIME, INR, APTT    HCG (If Applicable): No results found for: PREGTESTUR, PREGSERUM, HCG, HCGQUANT     ABGs: No results found for: PHART, PO2ART, XLV1CMI, VCY9BGR, BEART, J5TGCKBV     Type & Screen (If Applicable):  No results found for: LABABO, [address]    Anesthesia Evaluation  Patient summary reviewed and Nursing notes reviewed  Airway: Mallampati: II  TM distance: >3 FB   Neck ROM: limited  Mouth opening: > = 3 FB Dental: normal exam         Pulmonary:Negative Pulmonary ROS and normal exam  breath sounds clear to auscultation                             Cardiovascular:Negative CV ROS          ECG reviewed  Rhythm: regular  Rate: normal                    Neuro/Psych:   Negative Neuro/Psych ROS              GI/Hepatic/Renal: Neg GI/Hepatic/Renal ROS            Endo/Other: Negative Endo/Other ROS                    Abdominal:           Vascular: negative vascular ROS. Anesthesia Plan      general     ASA 2       Induction: intravenous. MIPS: Postoperative opioids intended and Prophylactic antiemetics administered. Anesthetic plan and risks discussed with patient.         Attending anesthesiologist reviewed and agrees with Shazia Woods MD   6/20/2019

## 2019-06-20 NOTE — ANESTHESIA POSTPROCEDURE EVALUATION
Department of Anesthesiology  Postprocedure Note    Patient: Darrel Bose  MRN: [id number]  YOB: 1950  Date of evaluation: 6/20/2019  Time:  3:18 PM     Procedure Summary     Date:  06/20/19 Room / Location:  [facility] OR [address] [place] [address] OR    Anesthesia Start:  1255 Anesthesia Stop:  3286    Procedure:  LAPAROSCOPIC BILATERAL INGUINAL HERNIA REPAIR WITH MESH (Bilateral Abdomen) Diagnosis:  (BILATERAL INGUINAL HERNIA)    Surgeon:  Gabriela Lai MD Responsible Provider:  Jessica Boswell MD    Anesthesia Type:  general ASA Status:  2          Anesthesia Type: general    Rafael Phase I: Rafael Score: 8    Rafael Phase II:      Last vitals: Reviewed and per EMR flowsheets.        Anesthesia Post Evaluation    Patient location during evaluation: bedside  Patient participation: complete - patient participated  Level of consciousness: awake and alert  Pain score: 0  Airway patency: patent  Nausea & Vomiting: no nausea and no vomiting  Complications: no  Cardiovascular status: blood pressure returned to baseline  Respiratory status: acceptable  Hydration status: stable

## 2019-06-20 NOTE — OP NOTE
vessels were located on the right side. Dissection was carried out lateral to the lateral anterior wall. The hernia sac was in the direct space medial to the epigastric vessels. This was reduced using blunt dissection with graspers. Attention was then turned to the left side of the preperitoneal space. This space was developed in similar fashion to the right. In this patient there was an indirect hernia. The hernia sac was grasped and retracted cranially. The hernia sac was dissected free from the cord and cord structures using blunt dissection and electrocautery, and placed back into the preperitoneal space. A large left 3DMAX mesh was selected and passed into the preperitoneal space. This was placed over the hernia defect, which gave us nice overlay to all areas of weakness, both medially and laterally. The mesh was secured with 4 tacks; 1 in Coopers ligament, 1 laterally, 1 superomedially and then 1 medially just superior to the pubis. A large right 3DMAX mesh was selected and passed into the preperitoneal space and tacked in similar fashion over the right sided hernia defect. The abdomen was desufflated under direct visualization. Attention was turned to the periumbilical incision, which was reapproximated with a figure-of-eight 0 Vicryl suture in an interrupted fashion. The skin incisions were irrigated and dried and reapproximated with 4-0 Monocryl in interrupted fashion. The wounds were dried and skin glue was applied. The patient was awoken from anesthesia alert and oriented with plans for discharge home today after voiding. All sponge, instrument and needle counts were correct x2 at the end of the case. Dr. Grabiel Mustafa was scrubbed and present for the entire case.     Anesthesia: General/ local 10 mL 0.5% marcaine    Surgeons/Assistants: Samantha / Esperanza Hines PGY4/ Diana PGY3    Estimated Blood Loss: <76 mL    Complications: none    Specimens: none    Findings: bilateral inguinal hernias; right direct, left indirect    Dispo: PACU then home    Sylvia Vasquez MD  06/20/19  6:19 PM

## 2019-06-20 NOTE — PROGRESS NOTES
Pt meets criteria for discharge to phase 2 however no beds available. Pt understands he must walk and void prior to discharge. Brother at bedside, discharge instructions reviewed and questions answered.

## 2019-06-20 NOTE — PROGRESS NOTES
Report received from marcio flores at bedside who landed pt. No intraop issues and pt denies pain when asked.

## 2019-06-20 NOTE — PROGRESS NOTES
Pt ambulated to bathroom to void and bladder scanned pt back in bed after voiding- bladder scan less than 90ml. Pt okay to discharge home.

## 2019-06-20 NOTE — PROGRESS NOTES
Ambulatory Surgery/Procedure Discharge Note    Vitals:    06/20/19 1700   BP: (!) 154/78   Pulse: 84   Resp: 16   Temp: 97.5 °F (36.4 °C)   SpO2: 98%     BP within 20% of pre op    No intake/output data recorded. Restroom use offered before discharge. Pt voided in bathroom prior to discharge    Pain assessment: pain tolerable, ice pack given to pt  Pain Level: 2        Patient discharged to home/self care.  Patient discharged via wheel chair by transporter to waiting family/S.O.       6/20/2019 5:48 PM

## 2019-06-20 NOTE — H&P
Jordana Wong    [id number]    [facility] Same Day Surgery Update H & P  Department of General Surgery   Surgical Service   Pre-operative History and Physical  Last H & P within the last 30 days. DIAGNOSIS:   BILATERAL INGUINAL HERNIA    PROCEDURE:  ME LAP,INGUINAL HERNIA REPR,INITIAL [26467] (HERNIA INGUINAL REPAIR LAPAROSCOPIC BILATERAL)     HISTORY OF PRESENT ILLNESS:    Patient with bilateral inguinal hernia with associated discomfort presents today for the above procedure.       Past Medical History:        Diagnosis Date    Asthma     Environmental allergies     Inguinal hernia      Past Surgical History:        Procedure Laterality Date    LIPOMA RESECTION      back      Past Social History:  Social History     Socioeconomic History    Marital status: Single     Spouse name: None    Number of children: None    Years of education: None    Highest education level: None   Occupational History    None   Social Needs    Financial resource strain: None    Food insecurity:     Worry: None     Inability: None    Transportation needs:     Medical: None     Non-medical: None   Tobacco Use    Smoking status: Never Smoker    Smokeless tobacco: Never Used   Substance and Sexual Activity    Alcohol use: Yes     Comment: occ     Drug use: No    Sexual activity: None   Lifestyle    Physical activity:     Days per week: None     Minutes per session: None    Stress: None   Relationships    Social connections:     Talks on phone: None     Gets together: None     Attends [employer] service: None     Active member of club or organization: None     Attends meetings of clubs or organizations: None     Relationship status: None    Intimate partner violence:     Fear of current or ex partner: None     Emotionally abused: None     Physically abused: None     Forced sexual activity: None   Other Topics Concern    None   Social History Narrative    None         Medications Prior to Admission:      Prior to Admission medications    Medication Sig Start Date End Date Taking? Authorizing Provider   aspirin 81 MG chewable tablet Take 81 mg by mouth daily    Historical Provider, MD   albuterol sulfate HFA (PROVENTIL HFA) 108 (90 BASE) MCG/ACT inhaler Inhale 2 puffs into the lungs every 4 hours as needed for Wheezing (2 puffs every 4 hours ×1 day, then as needed.) 1/24/16   ENID Winn CNP         Allergies:  Patient has no known allergies. PHYSICAL EXAM:      BP (!) 152/81   Pulse 52   Temp 97.8 °F (36.6 °C) (Oral)   Resp 14   Ht 5' 6\" (1.676 m)   Wt 154 lb (69.9 kg)   SpO2 98%   BMI 24.86 kg/m²      Heart:  regular rate and rhythm    Lungs:  No increased work of breathing, good air exchange, clear to auscultation bilaterally, no crackles or wheezing    Abdomen:  soft, non-distended, non-tender, no rebound tenderness or guarding and normal active bowel sounds    ASSESSMENT AND PLAN:    1. Patient seen and focused exam done today- no new changes since last physical exam on 6/10/19    2. Access to ancillary services are available per request of the provider. ENID Garcia CNP     6/20/2019     Addendum:  Reviewed and agree with above documentation.     Berenice Izquierdo MD  06/20/19  12:52 PM

## 2019-06-20 NOTE — BRIEF OP NOTE
Brief Postoperative Note  ______________________________________________________________    Patient: Shelby Hines  YOB: 1950  MRN: [id number]  Date of Procedure: 6/20/2019    Pre-Op Diagnosis: BILATERAL INGUINAL HERNIA    Post-Op Diagnosis: Same       Procedure(s):  LAPAROSCOPIC BILATERAL INGUINAL HERNIA REPAIR WITH MESH    Anesthesia: General    Surgeon(s):  MD Beatrice Montalvo MD -Chief     Assistant: Lara Ortiz PGY 3     Estimated Blood Loss (mL): less than 50     Complications: None    Implants:  Right and Left large 3D Lobo Mesh       Drains: * No LDAs found *    Findings: Right side direct hernia, Left side indirect hernia with cord lipoma     Rowdy Villafana MD  Date: 6/20/2019  Time: 2:52 PM   [phone]

## 2019-07-01 ENCOUNTER — OFFICE VISIT (OUTPATIENT)
Dept: INTERNAL MEDICINE CLINIC | Age: 69
End: 2019-07-01
Payer: MEDICARE

## 2019-07-01 VITALS
TEMPERATURE: 98.6 F | OXYGEN SATURATION: 98 % | BODY MASS INDEX: 23.42 KG/M2 | RESPIRATION RATE: 20 BRPM | WEIGHT: 145.7 LBS | DIASTOLIC BLOOD PRESSURE: 75 MMHG | HEIGHT: 66 IN | SYSTOLIC BLOOD PRESSURE: 151 MMHG | HEART RATE: 57 BPM

## 2019-07-01 DIAGNOSIS — Z09 POSTOP CHECK: Primary | ICD-10-CM

## 2019-07-01 PROCEDURE — 99213 OFFICE O/P EST LOW 20 MIN: CPT | Performed by: STUDENT IN AN ORGANIZED HEALTH CARE EDUCATION/TRAINING PROGRAM

## 2019-07-01 NOTE — PROGRESS NOTES
Department of General Surgery - Adult  General Surgery Service  Resident Clinic Note      CC:  Post op follow up for bilateral inguinal hernia repair on 6/20 by Dr. Selma ePrez. Patient says he had little pain following surgery and currently does not complain of any pain. Denies bleeding, fever. He states he had a few days of constipation postoperatively but drank prune juice and has has return of normal bowel function since. History Obtained From:  patient    HISTORY OF PRESENT ILLNESS:  This is a 78 y.o. male with Hx of bilateral     Past Medical History:   Diagnosis Date    Asthma     Environmental allergies     Inguinal hernia      Past Surgical History:   Procedure Laterality Date    INGUINAL HERNIA REPAIR Bilateral 6/20/2019    LAPAROSCOPIC BILATERAL INGUINAL HERNIA REPAIR WITH MESH performed by Briana Mcdowell MD at [facility]      back      Current Outpatient Medications   Medication Sig Dispense Refill    aspirin 81 MG chewable tablet Take 81 mg by mouth daily      albuterol sulfate HFA (PROVENTIL HFA) 108 (90 BASE) MCG/ACT inhaler Inhale 2 puffs into the lungs every 4 hours as needed for Wheezing (2 puffs every 4 hours ×1 day, then as needed.) 1 Inhaler 0     No current facility-administered medications for this visit. No Known Allergies  Social History     Tobacco Use    Smoking status: Never Smoker    Smokeless tobacco: Never Used   Substance Use Topics    Alcohol use: Yes     Comment: occ     Drug use: No     No family history on file. REVIEW OF SYSTEMS:    A 14 point review of systems was conducted, significant findings as noted in HPI. All other systems negative.     PHYSICAL EXAM:    Vitals:    07/01/19 0842   BP: (!) 151/75   Pulse: 57   Resp: 20   Temp: 98.6 °F (37 °C)   SpO2: 98%   Weight: 145 lb 11.2 oz (66.1 kg)   Height: 5' 6\" (1.676 m)       General appearance: alert, no acute distress, grooming appropriate  Eyes: no scleral icterus  Neck: trachea midline, no JVD  Chest/Lungs: CTAB, no crackles/rales, wheezes/rhonchi, normal effort  Cardiovascular: bradycardic, no murmurs/gallops/rubs  Abdomen: 3 incisions c/d/i, soft, non-tender, non-distended, +BS, no guarding/rigidity  Skin: warm and dry, no rashes  Extremities: no edema, no cyanosis  Neuro: A&Ox3, no focal deficits, sensation intact    ASSESSMENT AND PLAN:  This is a 78 y.o. male with Hx of bilateral inguinal hernia repair 10 days postop     - do not lift anything heavier than a milk jug (8lbs) for 6 weeks from date of surgery  - can shower but do not scrub incision sites and do not soak until 4 weeks post op  - return as needed or if fever or pain around surgical site develops    Ciaran Neumann DO, PGY-1  07/01/19  9:30 AM

## 2019-08-27 DIAGNOSIS — R79.89 ELEVATED SERUM CREATININE: ICD-10-CM

## 2019-08-27 LAB
CHOLESTEROL, FASTING: 208 MG/DL (ref 0–199)
HDLC SERPL-MCNC: 69 MG/DL (ref 40–60)
LDL CHOLESTEROL CALCULATED: 127 MG/DL
MAGNESIUM: 2.2 MG/DL (ref 1.8–2.4)
TRIGLYCERIDE, FASTING: 59 MG/DL (ref 0–150)
VLDLC SERPL CALC-MCNC: 12 MG/DL

## 2019-08-28 LAB
ESTIMATED AVERAGE GLUCOSE: 111.2 MG/DL
HBA1C MFR BLD: 5.5 %

## 2021-11-08 ENCOUNTER — OFFICE VISIT (OUTPATIENT)
Dept: INTERNAL MEDICINE CLINIC | Age: 71
End: 2021-11-08
Payer: MEDICARE

## 2021-11-08 VITALS
HEART RATE: 84 BPM | WEIGHT: 150.6 LBS | SYSTOLIC BLOOD PRESSURE: 177 MMHG | HEIGHT: 66 IN | DIASTOLIC BLOOD PRESSURE: 95 MMHG | TEMPERATURE: 98.2 F | BODY MASS INDEX: 24.2 KG/M2 | OXYGEN SATURATION: 95 % | RESPIRATION RATE: 20 BRPM

## 2021-11-08 DIAGNOSIS — Z12.5 SCREENING PSA (PROSTATE SPECIFIC ANTIGEN): ICD-10-CM

## 2021-11-08 DIAGNOSIS — I35.8 AORTIC SYSTOLIC MURMUR ON EXAMINATION: ICD-10-CM

## 2021-11-08 DIAGNOSIS — Z00.00 HEALTHCARE MAINTENANCE: ICD-10-CM

## 2021-11-08 DIAGNOSIS — N40.1 BENIGN PROSTATIC HYPERPLASIA WITH URINARY FREQUENCY: Primary | ICD-10-CM

## 2021-11-08 DIAGNOSIS — N40.1 BENIGN PROSTATIC HYPERPLASIA WITH URINARY FREQUENCY: ICD-10-CM

## 2021-11-08 DIAGNOSIS — R35.0 BENIGN PROSTATIC HYPERPLASIA WITH URINARY FREQUENCY: ICD-10-CM

## 2021-11-08 DIAGNOSIS — I10 PRIMARY HYPERTENSION: ICD-10-CM

## 2021-11-08 DIAGNOSIS — R97.20 ELEVATED PSA: ICD-10-CM

## 2021-11-08 DIAGNOSIS — R35.0 URINARY FREQUENCY: ICD-10-CM

## 2021-11-08 DIAGNOSIS — Z23 NEED FOR TETANUS, DIPHTHERIA, AND ACELLULAR PERTUSSIS (TDAP) VACCINE: ICD-10-CM

## 2021-11-08 DIAGNOSIS — R35.0 BENIGN PROSTATIC HYPERPLASIA WITH URINARY FREQUENCY: Primary | ICD-10-CM

## 2021-11-08 LAB
A/G RATIO: 1.8 (ref 1.1–2.2)
ALBUMIN SERPL-MCNC: 4.4 G/DL (ref 3.4–5)
ALP BLD-CCNC: 96 U/L (ref 40–129)
ALT SERPL-CCNC: 14 U/L (ref 10–40)
ANION GAP SERPL CALCULATED.3IONS-SCNC: 13 MMOL/L (ref 3–16)
AST SERPL-CCNC: 21 U/L (ref 15–37)
BASOPHILS ABSOLUTE: 0.1 K/UL (ref 0–0.2)
BASOPHILS RELATIVE PERCENT: 1 %
BILIRUB SERPL-MCNC: 0.3 MG/DL (ref 0–1)
BILIRUBIN URINE: NEGATIVE
BLOOD, URINE: NEGATIVE
BUN BLDV-MCNC: 17 MG/DL (ref 7–20)
CALCIUM SERPL-MCNC: 9.7 MG/DL (ref 8.3–10.6)
CHLORIDE BLD-SCNC: 98 MMOL/L (ref 99–110)
CHOLESTEROL, TOTAL: 185 MG/DL (ref 0–199)
CLARITY: CLEAR
CO2: 26 MMOL/L (ref 21–32)
COLOR: YELLOW
COMMENT UA: ABNORMAL
CREAT SERPL-MCNC: 0.9 MG/DL (ref 0.8–1.3)
EOSINOPHILS ABSOLUTE: 0.2 K/UL (ref 0–0.6)
EOSINOPHILS RELATIVE PERCENT: 2.5 %
EPITHELIAL CELLS, UA: 6 /HPF (ref 0–5)
GFR AFRICAN AMERICAN: >60
GFR NON-AFRICAN AMERICAN: >60
GLUCOSE BLD-MCNC: 107 MG/DL (ref 70–99)
GLUCOSE URINE: NEGATIVE MG/DL
HCT VFR BLD CALC: 43.4 % (ref 40.5–52.5)
HDLC SERPL-MCNC: 58 MG/DL (ref 40–60)
HEMOGLOBIN: 13.9 G/DL (ref 13.5–17.5)
HYALINE CASTS: 14 /LPF (ref 0–8)
KETONES, URINE: ABNORMAL MG/DL
LDL CHOLESTEROL CALCULATED: 116 MG/DL
LEUKOCYTE ESTERASE, URINE: ABNORMAL
LYMPHOCYTES ABSOLUTE: 1.9 K/UL (ref 1–5.1)
LYMPHOCYTES RELATIVE PERCENT: 22.5 %
MCH RBC QN AUTO: 27 PG (ref 26–34)
MCHC RBC AUTO-ENTMCNC: 31.9 G/DL (ref 31–36)
MCV RBC AUTO: 84.4 FL (ref 80–100)
MICROSCOPIC EXAMINATION: YES
MONOCYTES ABSOLUTE: 0.6 K/UL (ref 0–1.3)
MONOCYTES RELATIVE PERCENT: 7.4 %
NEUTROPHILS ABSOLUTE: 5.5 K/UL (ref 1.7–7.7)
NEUTROPHILS RELATIVE PERCENT: 66.6 %
NITRITE, URINE: NEGATIVE
PDW BLD-RTO: 16.6 % (ref 12.4–15.4)
PH UA: 6.5 (ref 5–8)
PLATELET # BLD: 487 K/UL (ref 135–450)
PMV BLD AUTO: 7.8 FL (ref 5–10.5)
POTASSIUM SERPL-SCNC: 4.9 MMOL/L (ref 3.5–5.1)
PROTEIN UA: 30 MG/DL
RBC # BLD: 5.14 M/UL (ref 4.2–5.9)
RBC UA: 5 /HPF (ref 0–4)
SODIUM BLD-SCNC: 137 MMOL/L (ref 136–145)
SPECIFIC GRAVITY UA: 1.03 (ref 1–1.03)
TOTAL PROTEIN: 6.8 G/DL (ref 6.4–8.2)
TRIGL SERPL-MCNC: 56 MG/DL (ref 0–150)
URINE REFLEX TO CULTURE: YES
URINE TYPE: ABNORMAL
UROBILINOGEN, URINE: 0.2 E.U./DL
VLDLC SERPL CALC-MCNC: 11 MG/DL
WBC # BLD: 8.3 K/UL (ref 4–11)
WBC UA: 18 /HPF (ref 0–5)

## 2021-11-08 PROCEDURE — 93005 ELECTROCARDIOGRAM TRACING: CPT | Performed by: STUDENT IN AN ORGANIZED HEALTH CARE EDUCATION/TRAINING PROGRAM

## 2021-11-08 PROCEDURE — 6360000002 HC RX W HCPCS: Performed by: STUDENT IN AN ORGANIZED HEALTH CARE EDUCATION/TRAINING PROGRAM

## 2021-11-08 PROCEDURE — 99213 OFFICE O/P EST LOW 20 MIN: CPT | Performed by: STUDENT IN AN ORGANIZED HEALTH CARE EDUCATION/TRAINING PROGRAM

## 2021-11-08 PROCEDURE — 90471 IMMUNIZATION ADMIN: CPT | Performed by: STUDENT IN AN ORGANIZED HEALTH CARE EDUCATION/TRAINING PROGRAM

## 2021-11-08 PROCEDURE — 90715 TDAP VACCINE 7 YRS/> IM: CPT | Performed by: STUDENT IN AN ORGANIZED HEALTH CARE EDUCATION/TRAINING PROGRAM

## 2021-11-08 RX ORDER — LISINOPRIL AND HYDROCHLOROTHIAZIDE 20; 12.5 MG/1; MG/1
1 TABLET ORAL DAILY
Qty: 90 TABLET | Refills: 1 | Status: SHIPPED | OUTPATIENT
Start: 2021-11-08 | End: 2022-04-14 | Stop reason: ALTCHOICE

## 2021-11-08 RX ORDER — BLOOD PRESSURE TEST KIT
1 KIT MISCELLANEOUS 2 TIMES DAILY
Qty: 1 KIT | Refills: 0 | Status: SHIPPED | OUTPATIENT
Start: 2021-11-08 | End: 2022-03-29 | Stop reason: CLARIF

## 2021-11-08 RX ORDER — TAMSULOSIN HYDROCHLORIDE 0.4 MG/1
0.4 CAPSULE ORAL DAILY
Qty: 30 CAPSULE | Refills: 1 | Status: SHIPPED | OUTPATIENT
Start: 2021-11-08 | End: 2022-01-04

## 2021-11-08 RX ADMIN — TETANUS TOXOID, REDUCED DIPHTHERIA TOXOID AND ACELLULAR PERTUSSIS VACCINE, ADSORBED 0.5 ML: 5; 2.5; 8; 8; 2.5 SUSPENSION INTRAMUSCULAR at 09:15

## 2021-11-08 ASSESSMENT — PATIENT HEALTH QUESTIONNAIRE - PHQ9
2. FEELING DOWN, DEPRESSED OR HOPELESS: 0
SUM OF ALL RESPONSES TO PHQ QUESTIONS 1-9: 0
SUM OF ALL RESPONSES TO PHQ9 QUESTIONS 1 & 2: 0
SUM OF ALL RESPONSES TO PHQ QUESTIONS 1-9: 0
1. LITTLE INTEREST OR PLEASURE IN DOING THINGS: 0
SUM OF ALL RESPONSES TO PHQ QUESTIONS 1-9: 0

## 2021-11-08 ASSESSMENT — VISUAL ACUITY: OU: 1

## 2021-11-08 NOTE — PROGRESS NOTES
2021    Patient's Name: Ines Pineda        : )    CC: Urinary Frequency    HPI: Pt is a 65 yo male, w/ ho of pet allergy related asthma, and b/l inguinal repairs who come in with complaints of urinary frequency. Sx have been gradually worsening over a a few years but about 2 weeks ago, it began to bother him so he decide to seek medical care. He has not seen a doctor in over 2 yrs and states he has been feeling well apart from this complaints. He denies dysuria, urinary urgency, incontinence or hematuria. He reports onofre pressure in his lower abd when he needs to urinate, which he attributes to his bladder being full. He states that sometimes he has a good stream of urine, other times, it dribble and other times it \"spurts all over. \" He always feel like he has fully emptied his bladder after urinates. He is not able to quantify how often he urinates during the day but states it a lot more than he used to. He reports wake up about 2-3 time a night to urinate which has been ongoing for year. He denies F/C HA, dizziness, CP, SOB. palpitations, abd pain, N/V/D/C and extremity weakness. Review of Systems  As noted in HPI above    Prior to Visit Medications    Medication Sig Taking?  Authorizing Provider   Loratadine-Pseudoephedrine (CLARITIN-D 12 HOUR PO) Take by mouth Yes Historical Provider, MD   tamsulosin (FLOMAX) 0.4 MG capsule Take 1 capsule by mouth daily Yes Lucho Cheng MD   lisinopril-hydroCHLOROthiazide (PRINZIDE;ZESTORETIC) 20-12.5 MG per tablet Take 1 tablet by mouth daily Yes Lucho Cheng MD   Blood Pressure KIT 1 kit by Does not apply route 2 times daily Yes Lucho Cheng MD        PHYSICAL EXAM    Vitals:    21 0816 21 0819 21 0830   BP: (!) 203/92 (!) 180/95 (!) 177/95   Site: Right Upper Arm Left Upper Arm Right Upper Arm   Position: Sitting Sitting Sitting   Cuff Size: Medium Adult Medium Adult Medium Adult   Pulse: 84     Resp: 20     Temp: 98.2 °F (36.8 °C)     TempSrc: Temporal     SpO2: 95%     Weight: 150 lb 9.6 oz (68.3 kg)     Height: 5' 6\" (1.676 m)        Estimated body mass index is 24.31 kg/m² as calculated from the following:    Height as of this encounter: 5' 6\" (1.676 m). Weight as of this encounter: 150 lb 9.6 oz (68.3 kg). Physical Exam  Constitutional:       General: He is not in acute distress. Appearance: Normal appearance. He is not ill-appearing. HENT:      Head: Normocephalic and atraumatic. Mouth/Throat:      Mouth: Mucous membranes are moist.   Eyes:      General: Vision grossly intact. Conjunctiva/sclera: Conjunctivae normal.   Cardiovascular:      Rate and Rhythm: Normal rate and regular rhythm. Pulses: Normal pulses. Heart sounds: S1 normal and S2 normal. Murmur (Palsystolic in aortic area) heard. No friction rub. No gallop. Pulmonary:      Effort: Pulmonary effort is normal. No respiratory distress. Breath sounds: Normal breath sounds and air entry. No wheezing or rales. Abdominal:      General: Bowel sounds are normal. There is no distension. Palpations: Abdomen is soft. Tenderness: There is no abdominal tenderness. Musculoskeletal:         General: Normal range of motion. Cervical back: Full passive range of motion without pain, normal range of motion and neck supple. Right lower leg: No edema. Left lower leg: No edema. Skin:     Capillary Refill: Capillary refill takes less than 2 seconds. Coloration: Skin is not pale. Neurological:      Mental Status: He is alert and oriented to person, place, and time. Mental status is at baseline. Psychiatric:         Mood and Affect: Mood normal.         Speech: Speech normal.         Judgment: Judgment normal.          ASSESSMENT AND PLAN    1. Benign prostatic hyperplasia with urinary frequency  - START tamsulosin (FLOMAX) 0.4 MG capsule; Take 1 capsule by mouth daily  Dispense: 30 capsule;  Refill: 1  - Psa screening; Future  - URINE RT REFLEX TO CULTURE    2. Primary hypertension  Currently at least stage 2. Initial /92, recheck 180/95, 177/95. BP has been elevated in the past as well at 150s/80s.   - START lisinopril-hydroCHLOROthiazide (PRINZIDE;ZESTORETIC) 20-12.5 MG per tablet; Take 1 tablet by mouth daily  Dispense: 90 tablet; Refill: 1  - CBC Auto Differential; Future  - Lipid Panel; Future  - Comprehensive Metabolic Panel; Future  - TSH with Reflex; Future\  - Blood Pressure KIT; 1 kit by Does not apply route 2 times daily  Dispense: 1 kit; Refill: 0  - Will return in 2 weeks with BP log.  - EKG 12 Lead    3. Aortic systolic murmur on examination  - EKG 12 Lead in office NSR  - ECHO Complete 2D W Doppler W Color; Future    4. Healthcare maintenance  - Hep C AB RLFX HCV PCR-A; Future  - Psa screening; Future  - Tetanus-Diphth-Acell Pertussis (BOOSTRIX) injection 0.5 mL      Return in about 2 weeks (around 11/22/2021). Pt discussed and staffed with Dr. Kade Mckeon.      Electronically signed by Lucho Cheng MD on 11/8/2021 at 9:56 AM

## 2021-11-09 LAB
PROSTATE SPECIFIC ANTIGEN: 84.2 NG/ML (ref 0–4)
TSH REFLEX: 0.5 UIU/ML (ref 0.27–4.2)
URINE CULTURE, ROUTINE: NORMAL

## 2021-11-10 LAB
HEPATITIS C VIRUS AB BY CIA INDEX: <0.02 IV
HEPATITIS C VIRUS AB BY CIA INTERPRETATION: NEGATIVE

## 2021-11-16 ENCOUNTER — TELEPHONE (OUTPATIENT)
Dept: INTERNAL MEDICINE CLINIC | Age: 71
End: 2021-11-16

## 2021-11-20 ENCOUNTER — HOSPITAL ENCOUNTER (OUTPATIENT)
Dept: NON INVASIVE DIAGNOSTICS | Age: 71
Discharge: HOME | End: 2021-11-20
Payer: MEDICARE

## 2021-11-20 LAB
LV EF: 50 %
LVEF MODALITY: NORMAL

## 2021-11-20 PROCEDURE — 93306 TTE W/DOPPLER COMPLETE: CPT

## 2021-11-22 ENCOUNTER — OFFICE VISIT (OUTPATIENT)
Dept: INTERNAL MEDICINE CLINIC | Age: 71
End: 2021-11-22
Payer: MEDICARE

## 2021-11-22 VITALS
OXYGEN SATURATION: 98 % | HEART RATE: 91 BPM | SYSTOLIC BLOOD PRESSURE: 153 MMHG | BODY MASS INDEX: 24.28 KG/M2 | HEIGHT: 66 IN | WEIGHT: 151.1 LBS | RESPIRATION RATE: 24 BRPM | DIASTOLIC BLOOD PRESSURE: 81 MMHG | TEMPERATURE: 97.9 F

## 2021-11-22 DIAGNOSIS — Z00.00 HEALTH CARE MAINTENANCE: ICD-10-CM

## 2021-11-22 DIAGNOSIS — Z23 NEED FOR PROPHYLACTIC VACCINATION AGAINST STREPTOCOCCUS PNEUMONIAE (PNEUMOCOCCUS): ICD-10-CM

## 2021-11-22 DIAGNOSIS — I10 PRIMARY HYPERTENSION: Primary | ICD-10-CM

## 2021-11-22 DIAGNOSIS — R97.20 ELEVATED PSA: ICD-10-CM

## 2021-11-22 DIAGNOSIS — Z12.11 ENCOUNTER FOR SCREENING COLONOSCOPY: ICD-10-CM

## 2021-11-22 PROCEDURE — 99213 OFFICE O/P EST LOW 20 MIN: CPT | Performed by: STUDENT IN AN ORGANIZED HEALTH CARE EDUCATION/TRAINING PROGRAM

## 2021-11-22 PROCEDURE — G0009 ADMIN PNEUMOCOCCAL VACCINE: HCPCS | Performed by: STUDENT IN AN ORGANIZED HEALTH CARE EDUCATION/TRAINING PROGRAM

## 2021-11-22 PROCEDURE — G0008 ADMIN INFLUENZA VIRUS VAC: HCPCS | Performed by: STUDENT IN AN ORGANIZED HEALTH CARE EDUCATION/TRAINING PROGRAM

## 2021-11-22 PROCEDURE — 90732 PPSV23 VACC 2 YRS+ SUBQ/IM: CPT | Performed by: STUDENT IN AN ORGANIZED HEALTH CARE EDUCATION/TRAINING PROGRAM

## 2021-11-22 PROCEDURE — 6360000002 HC RX W HCPCS: Performed by: STUDENT IN AN ORGANIZED HEALTH CARE EDUCATION/TRAINING PROGRAM

## 2021-11-22 RX ADMIN — PNEUMOCOCCAL VACCINE POLYVALENT 0.5 ML
25; 25; 25; 25; 25; 25; 25; 25; 25; 25; 25; 25; 25; 25; 25; 25; 25; 25; 25; 25; 25; 25; 25 INJECTION, SOLUTION INTRAMUSCULAR; SUBCUTANEOUS at 09:20

## 2021-11-22 ASSESSMENT — VISUAL ACUITY: OU: 1

## 2021-11-22 NOTE — PROGRESS NOTES
2021    Patient's Name: Segun Mao        : )    CC: HTN follow up    HPI: 64yo M w/ ho of HTN and elevated PSA on recent labs here for follow up of his BP. He was started on Prinzide his last visit on  and is here for a BP check. BP logs at home show controlled BP that ranges from 1teens-140s/60s-80s. BP this morning is 154/83 but pt reports BP is always elevated during doctor's visits. He continues to have urinary frequency and sometimes he has a good stream of urine, other times, it dribble and other times it \"spurts all over. \" Otherwise, he states he is doing well today and has no acute complaints. He denies F/C HA, dizziness, CP, SOB. palpitations, abd pain, N/V/D/C, extremity weakness. Review of Systems   As noted in HPI above    Prior to Visit Medications    Medication Sig Taking? Authorizing Provider   Loratadine-Pseudoephedrine (CLARITIN-D 12 HOUR PO) Take by mouth Yes Historical Provider, MD   tamsulosin (FLOMAX) 0.4 MG capsule Take 1 capsule by mouth daily Yes Xenia Arteaga MD   lisinopril-hydroCHLOROthiazide (PRINZIDE;ZESTORETIC) 20-12.5 MG per tablet Take 1 tablet by mouth daily Yes Xenia Arteaga MD   Blood Pressure KIT 1 kit by Does not apply route 2 times daily Yes Xenia Arteaga MD        PHYSICAL EXAM    Vitals:    21 0810 21 0813   BP: (!) 154/83 (!) 153/81   Site: Left Upper Arm Left Upper Arm   Position: Sitting Sitting   Cuff Size: Medium Adult Medium Adult   Pulse: 91    Resp: 24    Temp: 97.9 °F (36.6 °C)    TempSrc: Temporal    SpO2: 98%    Weight: 151 lb 1.6 oz (68.5 kg)    Height: 5' 6\" (1.676 m)       Estimated body mass index is 24.39 kg/m² as calculated from the following:    Height as of this encounter: 5' 6\" (1.676 m). Weight as of this encounter: 151 lb 1.6 oz (68.5 kg). Physical Exam  Constitutional:       General: He is not in acute distress. Appearance: Normal appearance. He is not ill-appearing.    HENT:      Head: Normocephalic and atraumatic. Eyes:      General: Vision grossly intact. Conjunctiva/sclera: Conjunctivae normal.   Cardiovascular:      Rate and Rhythm: Normal rate and regular rhythm. Pulses: Normal pulses. Heart sounds: S1 normal and S2 normal. Murmur (Pansystolic in aortic area) heard. No friction rub. No gallop. Pulmonary:      Effort: Pulmonary effort is normal. No respiratory distress. Breath sounds: Normal breath sounds and air entry. No wheezing or rales. Abdominal:      General: Bowel sounds are normal. There is no distension. Palpations: Abdomen is soft. Tenderness: There is no abdominal tenderness. Musculoskeletal:         General: Normal range of motion. Cervical back: Full passive range of motion without pain, normal range of motion and neck supple. Right lower leg: No edema. Left lower leg: No edema. Skin:     Capillary Refill: Capillary refill takes less than 2 seconds. Coloration: Skin is not pale. Neurological:      Mental Status: He is alert and oriented to person, place, and time. Mental status is at baseline. Psychiatric:         Mood and Affect: Mood normal.         Speech: Speech normal.         Judgment: Judgment normal.        ASSESSMENT AND PLAN    1. Primary hypertension  Controlled. Home BP logs reviews  - Continue Prinzide 20-12.5    2. Elevated PSA  Pt was diagnosed with BPH recently and started on Flomax. However, recent screening PSA resulted 84.20  - Called pt with results and reffered him to Urology   - Pt has an appointment with Dr. Joel Dietz on 12/03  - Will continue to follow    3. Health care maintenance  - pneumococcal polyvalent (PNEUMOVAX 23) inj 0.5 mL  - AFL - Jere Reed MD, Gastroenterology, [facility]      Return in about 4 months (around 3/22/2022). Electronically signed by Kelsey Cuenca MD on 11/22/2021 at 9:11 AM     Pt discussed and staffed with Dr. Clive Mathias.

## 2021-11-22 NOTE — PATIENT INSTRUCTIONS
- Continue taking all your medication as prescribed. You blood pressure looks great.   - Call to make an appointment for a colonoscopy  - Make sure to keep your urology appointment   - Go to any pharmacy for you flu shot  - Follow up in 4 months

## 2021-12-20 ENCOUNTER — OFFICE VISIT (OUTPATIENT)
Dept: INTERNAL MEDICINE CLINIC | Age: 71
End: 2021-12-20
Payer: MEDICARE

## 2021-12-20 VITALS
OXYGEN SATURATION: 99 % | BODY MASS INDEX: 24.91 KG/M2 | HEART RATE: 63 BPM | TEMPERATURE: 97.2 F | WEIGHT: 155 LBS | HEIGHT: 66 IN | SYSTOLIC BLOOD PRESSURE: 161 MMHG | DIASTOLIC BLOOD PRESSURE: 74 MMHG

## 2021-12-20 DIAGNOSIS — Z01.818 PREOP EXAMINATION: Primary | ICD-10-CM

## 2021-12-20 DIAGNOSIS — Z01.818 PREOP EXAMINATION: ICD-10-CM

## 2021-12-20 LAB
ANION GAP SERPL CALCULATED.3IONS-SCNC: 10 MMOL/L (ref 3–16)
BUN BLDV-MCNC: 19 MG/DL (ref 7–20)
CALCIUM SERPL-MCNC: 9.4 MG/DL (ref 8.3–10.6)
CHLORIDE BLD-SCNC: 98 MMOL/L (ref 99–110)
CO2: 27 MMOL/L (ref 21–32)
CREAT SERPL-MCNC: 0.6 MG/DL (ref 0.8–1.3)
GFR AFRICAN AMERICAN: >60
GFR NON-AFRICAN AMERICAN: >60
GLUCOSE BLD-MCNC: 94 MG/DL (ref 70–99)
POTASSIUM SERPL-SCNC: 4.8 MMOL/L (ref 3.5–5.1)
SODIUM BLD-SCNC: 135 MMOL/L (ref 136–145)

## 2021-12-20 PROCEDURE — 99213 OFFICE O/P EST LOW 20 MIN: CPT | Performed by: STUDENT IN AN ORGANIZED HEALTH CARE EDUCATION/TRAINING PROGRAM

## 2021-12-20 ASSESSMENT — VISUAL ACUITY: OU: 1

## 2021-12-20 NOTE — PATIENT INSTRUCTIONS
- Go and get labs done. Results will be faxed to the Urology group  - Do not take Lisinopril- Hydrochlorothiazide or Claritin-D the morning of your procedure. You can take these pills after the procedure. - Keep your follow up appointment in March.

## 2022-01-04 DIAGNOSIS — R35.0 BENIGN PROSTATIC HYPERPLASIA WITH URINARY FREQUENCY: ICD-10-CM

## 2022-01-04 DIAGNOSIS — N40.1 BENIGN PROSTATIC HYPERPLASIA WITH URINARY FREQUENCY: ICD-10-CM

## 2022-01-04 RX ORDER — TAMSULOSIN HYDROCHLORIDE 0.4 MG/1
0.4 CAPSULE ORAL DAILY
Qty: 30 CAPSULE | Refills: 1 | Status: SHIPPED | OUTPATIENT
Start: 2022-01-04 | End: 2022-03-09

## 2022-01-04 NOTE — TELEPHONE ENCOUNTER
Last ov - 12/20/2021 - Dr. Ernie Valentine  Next ov - 03/14/2022 -      Tamsulosin last filled  11/08/2021 -  1 rf.

## 2022-01-11 ENCOUNTER — HOSPITAL ENCOUNTER (OUTPATIENT)
Dept: NUCLEAR MEDICINE | Age: 72
Discharge: HOME | End: 2022-01-11
Payer: MEDICARE

## 2022-01-11 ENCOUNTER — HOSPITAL ENCOUNTER (OUTPATIENT)
Dept: CT IMAGING | Age: 72
Discharge: HOME | End: 2022-01-11
Payer: MEDICARE

## 2022-01-11 DIAGNOSIS — R97.20 ELEVATED PROSTATE SPECIFIC ANTIGEN (PSA): ICD-10-CM

## 2022-01-11 DIAGNOSIS — Z80.52 FAMILY HISTORY OF MALIGNANT NEOPLASM OF BLADDER: ICD-10-CM

## 2022-01-11 PROCEDURE — 3430000000 HC RX DIAGNOSTIC RADIOPHARMACEUTICAL: Performed by: UROLOGY

## 2022-01-11 PROCEDURE — 6360000004 HC RX CONTRAST MEDICATION: Performed by: STUDENT IN AN ORGANIZED HEALTH CARE EDUCATION/TRAINING PROGRAM

## 2022-01-11 PROCEDURE — 74177 CT ABD & PELVIS W/CONTRAST: CPT

## 2022-01-11 PROCEDURE — A9503 TC99M MEDRONATE: HCPCS | Performed by: UROLOGY

## 2022-01-11 PROCEDURE — 78306 BONE IMAGING WHOLE BODY: CPT

## 2022-01-11 RX ORDER — TC 99M MEDRONATE 20 MG/10ML
25 INJECTION, POWDER, LYOPHILIZED, FOR SOLUTION INTRAVENOUS
Status: COMPLETED | OUTPATIENT
Start: 2022-01-11 | End: 2022-01-11

## 2022-01-11 RX ADMIN — IOHEXOL 50 ML: 240 INJECTION, SOLUTION INTRATHECAL; INTRAVASCULAR; INTRAVENOUS; ORAL at 12:19

## 2022-01-11 RX ADMIN — TC 99M MEDRONATE 25 MILLICURIE: 20 INJECTION, POWDER, LYOPHILIZED, FOR SOLUTION INTRAVENOUS at 11:51

## 2022-01-11 RX ADMIN — IOPAMIDOL 80 ML: 755 INJECTION, SOLUTION INTRAVENOUS at 12:19

## 2022-01-15 ENCOUNTER — CLINICAL DOCUMENTATION (OUTPATIENT)
Dept: OTHER | Age: 72
End: 2022-01-15

## 2022-01-20 ENCOUNTER — APPOINTMENT (OUTPATIENT)
Dept: GENERAL RADIOLOGY | Age: 72
End: 2022-01-20
Payer: MEDICARE

## 2022-01-20 ENCOUNTER — HOSPITAL ENCOUNTER (EMERGENCY)
Age: 72
Discharge: HOME | End: 2022-01-20
Attending: EMERGENCY MEDICINE
Payer: MEDICARE

## 2022-01-20 ENCOUNTER — HOSPITAL ENCOUNTER (OUTPATIENT)
Dept: MRI IMAGING | Age: 72
Discharge: HOME | End: 2022-01-20
Payer: MEDICARE

## 2022-01-20 VITALS
RESPIRATION RATE: 16 BRPM | TEMPERATURE: 97.5 F | BODY MASS INDEX: 24.11 KG/M2 | HEIGHT: 66 IN | DIASTOLIC BLOOD PRESSURE: 68 MMHG | SYSTOLIC BLOOD PRESSURE: 134 MMHG | WEIGHT: 150 LBS | HEART RATE: 86 BPM | OXYGEN SATURATION: 100 %

## 2022-01-20 DIAGNOSIS — C61 MALIGNANT NEOPLASM OF PROSTATE (HCC): ICD-10-CM

## 2022-01-20 DIAGNOSIS — R55 SYNCOPE AND COLLAPSE: Primary | ICD-10-CM

## 2022-01-20 LAB
ANION GAP SERPL CALCULATED.3IONS-SCNC: 12 MMOL/L (ref 3–16)
BASOPHILS ABSOLUTE: 0 K/UL (ref 0–0.2)
BASOPHILS RELATIVE PERCENT: 0.3 %
BUN BLDV-MCNC: 22 MG/DL (ref 7–20)
CALCIUM SERPL-MCNC: 8.5 MG/DL (ref 8.3–10.6)
CHLORIDE BLD-SCNC: 98 MMOL/L (ref 99–110)
CO2: 24 MMOL/L (ref 21–32)
CREAT SERPL-MCNC: 0.9 MG/DL (ref 0.8–1.3)
EKG ATRIAL RATE: 85 BPM
EKG DIAGNOSIS: NORMAL
EKG P AXIS: 73 DEGREES
EKG P-R INTERVAL: 156 MS
EKG Q-T INTERVAL: 390 MS
EKG QRS DURATION: 78 MS
EKG QTC CALCULATION (BAZETT): 464 MS
EKG R AXIS: -56 DEGREES
EKG T AXIS: 76 DEGREES
EKG VENTRICULAR RATE: 85 BPM
EOSINOPHILS ABSOLUTE: 0.1 K/UL (ref 0–0.6)
EOSINOPHILS RELATIVE PERCENT: 1.6 %
GFR AFRICAN AMERICAN: >60
GFR NON-AFRICAN AMERICAN: >60
GLUCOSE BLD-MCNC: 134 MG/DL (ref 70–99)
HCT VFR BLD CALC: 41.6 % (ref 40.5–52.5)
HEMOGLOBIN: 13.9 G/DL (ref 13.5–17.5)
LYMPHOCYTES ABSOLUTE: 1.8 K/UL (ref 1–5.1)
LYMPHOCYTES RELATIVE PERCENT: 30.1 %
MCH RBC QN AUTO: 29 PG (ref 26–34)
MCHC RBC AUTO-ENTMCNC: 33.5 G/DL (ref 31–36)
MCV RBC AUTO: 86.7 FL (ref 80–100)
MONOCYTES ABSOLUTE: 0.4 K/UL (ref 0–1.3)
MONOCYTES RELATIVE PERCENT: 6.2 %
NEUTROPHILS ABSOLUTE: 3.6 K/UL (ref 1.7–7.7)
NEUTROPHILS RELATIVE PERCENT: 61.8 %
PDW BLD-RTO: 17.5 % (ref 12.4–15.4)
PLATELET # BLD: 463 K/UL (ref 135–450)
PMV BLD AUTO: 7.6 FL (ref 5–10.5)
POTASSIUM REFLEX MAGNESIUM: 3.9 MMOL/L (ref 3.5–5.1)
RBC # BLD: 4.8 M/UL (ref 4.2–5.9)
SODIUM BLD-SCNC: 134 MMOL/L (ref 136–145)
TROPONIN: <0.01 NG/ML
WBC # BLD: 5.9 K/UL (ref 4–11)

## 2022-01-20 PROCEDURE — A9579 GAD-BASE MR CONTRAST NOS,1ML: HCPCS | Performed by: UROLOGY

## 2022-01-20 PROCEDURE — 80048 BASIC METABOLIC PNL TOTAL CA: CPT

## 2022-01-20 PROCEDURE — 85025 COMPLETE CBC W/AUTO DIFF WBC: CPT

## 2022-01-20 PROCEDURE — 84484 ASSAY OF TROPONIN QUANT: CPT

## 2022-01-20 PROCEDURE — 72197 MRI PELVIS W/O & W/DYE: CPT

## 2022-01-20 PROCEDURE — 99283 EMERGENCY DEPT VISIT LOW MDM: CPT

## 2022-01-20 PROCEDURE — 2580000003 HC RX 258: Performed by: STUDENT IN AN ORGANIZED HEALTH CARE EDUCATION/TRAINING PROGRAM

## 2022-01-20 PROCEDURE — 6360000004 HC RX CONTRAST MEDICATION: Performed by: UROLOGY

## 2022-01-20 PROCEDURE — 93005 ELECTROCARDIOGRAM TRACING: CPT | Performed by: STUDENT IN AN ORGANIZED HEALTH CARE EDUCATION/TRAINING PROGRAM

## 2022-01-20 PROCEDURE — 71046 X-RAY EXAM CHEST 2 VIEWS: CPT

## 2022-01-20 RX ORDER — SODIUM CHLORIDE, SODIUM LACTATE, POTASSIUM CHLORIDE, AND CALCIUM CHLORIDE .6; .31; .03; .02 G/100ML; G/100ML; G/100ML; G/100ML
1000 INJECTION, SOLUTION INTRAVENOUS ONCE
Status: COMPLETED | OUTPATIENT
Start: 2022-01-20 | End: 2022-01-20

## 2022-01-20 RX ADMIN — SODIUM CHLORIDE, POTASSIUM CHLORIDE, SODIUM LACTATE AND CALCIUM CHLORIDE 1000 ML: 600; 310; 30; 20 INJECTION, SOLUTION INTRAVENOUS at 17:21

## 2022-01-20 RX ADMIN — GADOTERIDOL 15 ML: 279.3 INJECTION, SOLUTION INTRAVENOUS at 16:37

## 2022-01-20 NOTE — ED PROVIDER NOTES
[address] [facility] RESIDENT NOTE       Date of evaluation: 1/20/2022    Chief Complaint     Loss of Consciousness (passed out in changing room after MRI)      History of Present Illness     Olga Fowler is a 66 y.o. male who presents to the emergency department after a syncopal event occurring just prior to arrival.  He had just undergone a pelvic MRI and was spent in the change room when he felt lightheaded and had a syncopal event. He was laying on the MRI table for approx 30-40mins and felt lightheaded even immediately after he got up after the scan. He fell from sitting height when he had the syncopal episode. He reports eating breakfast this morning and otherwise denies any preceding chest pain or headache or difficulty breathing or abdominal pain or nausea or vomiting prior to his syncope. He is currently asymptomatic and no longer feels lightheaded. Denies any prior history of syncopal events. Not on anticoagulation. Review of Systems   As per HPI, otherwise all other systems reviewed and negative. Past Medical, Surgical, Family, and Social History     He has a past medical history of Asthma, Environmental allergies, and Inguinal hernia. He has a past surgical history that includes lipoma resection and Inguinal hernia repair (Bilateral, 6/20/2019). His family history is not on file. He reports that he has never smoked. He has never used smokeless tobacco. He reports current alcohol use. He reports that he does not use drugs. Medications     Previous Medications    BLOOD PRESSURE KIT    1 kit by Does not apply route 2 times daily    LISINOPRIL-HYDROCHLOROTHIAZIDE (PRINZIDE;ZESTORETIC) 20-12.5 MG PER TABLET    Take 1 tablet by mouth daily    TAMSULOSIN (FLOMAX) 0.4 MG CAPSULE    TAKE 1 CAPSULE BY MOUTH DAILY       Allergies     He is allergic to other.     Physical Exam     INITIAL VITALS: BP: 123/77, Temp: 97.5 °F (36.4 °C), Pulse: 93, Resp: 16, SpO2: 100 %     General:  Well-developed, NAD  HEENT:  Normocephalic, atraumatic, PERRL, EOMI, oropharynx clear, no nasal bony ttp  Neck:  Supple, trachea midline, no midline C-spine ttp  Pulmonary:   CTAB, good air movement  Cardiac:  RRR, no M/R/G  Abdomen:  Soft, non-tender, non-distended, no rebounding or guarding  Musculoskeletal:  2+ pulses, no peripheral edema; non-tender throughout all 4 exts  Skin:  No rashes or lesions  Neuro: aox4, able to move all four extremities, equal strength BUE/BLE, sensory exam grossly intact, cranial nerves II-XII intact, no pronator drift; normal gait.   Psych:  Appropriate mood and affect    DiagnosticResults     EKG   Interpreted in conjunction with emergency department physician Anjali Mi, *  Rhythm: normal sinus   Rate: 85  Axis: left  Ectopy: none  Conduction: left anterior fasciclar block  ST Segments: normal  T Waves: inversion in  aVl  Q Waves: none  Clinical Impression: no acute changes  Comparison:  11/8/21; old TW changes    RADIOLOGY:  XR CHEST (2 VW)   Final Result    No acute cardiopulmonary abnormality          LABS:   Results for orders placed or performed during the hospital encounter of 01/20/22   CBC Auto Differential   Result Value Ref Range    WBC 5.9 4.0 - 11.0 K/uL    RBC 4.80 4.20 - 5.90 M/uL    Hemoglobin 13.9 13.5 - 17.5 g/dL    Hematocrit 41.6 40.5 - 52.5 %    MCV 86.7 80.0 - 100.0 fL    MCH 29.0 26.0 - 34.0 pg    MCHC 33.5 31.0 - 36.0 g/dL    RDW 17.5 (H) 12.4 - 15.4 %    Platelets 515 (H) 838 - 450 K/uL    MPV 7.6 5.0 - 10.5 fL    Neutrophils % 61.8 %    Lymphocytes % 30.1 %    Monocytes % 6.2 %    Eosinophils % 1.6 %    Basophils % 0.3 %    Neutrophils Absolute 3.6 1.7 - 7.7 K/uL    Lymphocytes Absolute 1.8 1.0 - 5.1 K/uL    Monocytes Absolute 0.4 0.0 - 1.3 K/uL    Eosinophils Absolute 0.1 0.0 - 0.6 K/uL    Basophils Absolute 0.0 0.0 - 0.2 K/uL   Basic Metabolic Panel w/ Reflex to MG   Result Value Ref Range    Sodium 134 (L) 136 - 145 mmol/L    Potassium reflex Magnesium 3.9 3.5 - 5.1 mmol/L    Chloride 98 (L) 99 - 110 mmol/L    CO2 24 21 - 32 mmol/L    Anion Gap 12 3 - 16    Glucose 134 (H) 70 - 99 mg/dL    BUN 22 (H) 7 - 20 mg/dL    CREATININE 0.9 0.8 - 1.3 mg/dL    GFR Non-African American >60 >60    GFR African American >60 >60    Calcium 8.5 8.3 - 10.6 mg/dL   Troponin   Result Value Ref Range    Troponin <0.01 <0.01 ng/mL   EKG 12 Lead   Result Value Ref Range    Ventricular Rate 85 BPM    Atrial Rate 85 BPM    P-R Interval 156 ms    QRS Duration 78 ms    Q-T Interval 390 ms    QTc Calculation (Bazett) 464 ms    P Axis 73 degrees    R Axis -56 degrees    T Axis 76 degrees    Diagnosis       EKG performed in ER and to be interpreted by ER physician. Confirmed by MD, ER ([id number]),  Myrtle Urrutia ([phone]) on 1/20/2022 5:17:22 PM       ED BEDSIDE ULTRASOUND:  n/a    RECENT VITALS:  BP: 123/77, Temp: 97.5 °F (36.4 °C), Pulse: 93,Resp: 16, SpO2: 100 %     Procedures   Procedures    ED Course     Nursing Notes, Past Medical Hx, Past Surgical Hx, Social Hx, Allergies, and Family Hx were reviewed. The patient was given the followingmedications:  Orders Placed This Encounter   Medications    lactated ringers bolus       CONSULTS:  None    MEDICAL Abdi Ro / ASSESSMENT / Felix Line is a 66 y.o. male with a history of asthma who is recently getting worked up for new diagnosis of prostate cancer, not yet having started treatment who presents to the ED after a syncopal event occurring just prior to arrival as he was in the changing room after finishing a pelvic MRI. No preceding cardiac or respiratory symptoms or abdominal symptoms. Will obtain EKG, CXR, Trop, CBC, BMP and administer IVF. EKG w/o acute ischemic changes. CXR w/o pulm edema or PTX or PNA. Troponin negative. No AQUILINO or hypoglycemia. Is borderline hyponatremic.   He was ambulated in the ED without any recurrent dizziness, he has no headaches or focal neurologic deficits. Normal gait. Low risk per [place] syncope rules. Will discharge with strict return precautions and follow-up instructions. This patient was also evaluated by the attending physician. All care plans were discussed and agreed upon. At this time the patient has been deemed safe for discharge. Verbal discharge instructions including strict return precautions for worsening or new symptoms have been communicated. The patient was advised to follow up with PCP. Clinical Impression     1.  Syncope and collapse        Disposition     PATIENT REFERRED TO:  Tawnya Jeans, MD  [address] [place] [id number] [phone]    Call in 1 day      [facility] Emergency Department  [address] [facility] [address] [phone]  Go to   As needed, If symptoms worsen      DISCHARGE MEDICATIONS:  New Prescriptions    No medications on file       DISPOSITION        Zen Kirby MD  Resident  01/20/22 0026

## 2022-01-20 NOTE — ED TRIAGE NOTES
Pt arrived to ED after syncopal episode after MRI here at [facility].. He had pelvic MRI and became dizzy after getting up off the MRI table. MRI tech heard crash while he was in changing room and found him passed out on ground. He quickly woke up after saying his name. Ayaan Benders face first. Hit nose and forehead. Dried blood noted from nostrils. Denies pain.

## 2022-01-21 NOTE — ED NOTES
Pt discharged from ED in stable, ambulatory condition. Discharge instructions explained, all questions answered. Pt walked to [facility] independently.        Jose G Ansari RN  01/20/22 8614

## 2022-02-12 ENCOUNTER — HOSPITAL ENCOUNTER (EMERGENCY)
Age: 72
Discharge: HOME | End: 2022-02-12
Attending: EMERGENCY MEDICINE
Payer: MEDICARE

## 2022-02-12 ENCOUNTER — ANCILLARY PROCEDURE (OUTPATIENT)
Dept: EMERGENCY DEPT | Age: 72
End: 2022-02-12
Payer: MEDICARE

## 2022-02-12 VITALS
OXYGEN SATURATION: 97 % | BODY MASS INDEX: 24.11 KG/M2 | SYSTOLIC BLOOD PRESSURE: 127 MMHG | DIASTOLIC BLOOD PRESSURE: 69 MMHG | WEIGHT: 150 LBS | TEMPERATURE: 98.4 F | HEART RATE: 67 BPM | RESPIRATION RATE: 15 BRPM | HEIGHT: 66 IN

## 2022-02-12 DIAGNOSIS — C61 PROSTATE CANCER (HCC): ICD-10-CM

## 2022-02-12 DIAGNOSIS — R33.8 ACUTE URINARY RETENTION: Primary | ICD-10-CM

## 2022-02-12 LAB
ANION GAP SERPL CALCULATED.3IONS-SCNC: 15 MMOL/L (ref 3–16)
BASOPHILS ABSOLUTE: 0 K/UL (ref 0–0.2)
BASOPHILS RELATIVE PERCENT: 0.2 %
BILIRUBIN URINE: NEGATIVE
BLOOD, URINE: ABNORMAL
BUN BLDV-MCNC: 29 MG/DL (ref 7–20)
CALCIUM SERPL-MCNC: 9.5 MG/DL (ref 8.3–10.6)
CHLORIDE BLD-SCNC: 96 MMOL/L (ref 99–110)
CLARITY: CLEAR
CO2: 24 MMOL/L (ref 21–32)
COLOR: ABNORMAL
CREAT SERPL-MCNC: 1.4 MG/DL (ref 0.8–1.3)
EOSINOPHILS ABSOLUTE: 0 K/UL (ref 0–0.6)
EOSINOPHILS RELATIVE PERCENT: 0 %
GFR AFRICAN AMERICAN: >60
GFR NON-AFRICAN AMERICAN: 50
GLUCOSE BLD-MCNC: 175 MG/DL (ref 70–99)
GLUCOSE URINE: NEGATIVE MG/DL
HCT VFR BLD CALC: 40.4 % (ref 40.5–52.5)
HEMOGLOBIN: 13.6 G/DL (ref 13.5–17.5)
KETONES, URINE: NEGATIVE MG/DL
LEUKOCYTE ESTERASE, URINE: NEGATIVE
LYMPHOCYTES ABSOLUTE: 0.7 K/UL (ref 1–5.1)
LYMPHOCYTES RELATIVE PERCENT: 5 %
MCH RBC QN AUTO: 28.5 PG (ref 26–34)
MCHC RBC AUTO-ENTMCNC: 33.6 G/DL (ref 31–36)
MCV RBC AUTO: 85 FL (ref 80–100)
MICROSCOPIC EXAMINATION: YES
MONOCYTES ABSOLUTE: 0.5 K/UL (ref 0–1.3)
MONOCYTES RELATIVE PERCENT: 3.3 %
NEUTROPHILS ABSOLUTE: 13.7 K/UL (ref 1.7–7.7)
NEUTROPHILS RELATIVE PERCENT: 91.5 %
NITRITE, URINE: NEGATIVE
PDW BLD-RTO: 16.9 % (ref 12.4–15.4)
PH UA: 6.5 (ref 5–8)
PLATELET # BLD: 490 K/UL (ref 135–450)
PMV BLD AUTO: 7.1 FL (ref 5–10.5)
POTASSIUM REFLEX MAGNESIUM: 4.1 MMOL/L (ref 3.5–5.1)
PROTEIN UA: ABNORMAL MG/DL
RBC # BLD: 4.76 M/UL (ref 4.2–5.9)
RBC UA: ABNORMAL /HPF (ref 0–4)
SODIUM BLD-SCNC: 135 MMOL/L (ref 136–145)
SPECIFIC GRAVITY UA: 1.01 (ref 1–1.03)
URINE TYPE: ABNORMAL
UROBILINOGEN, URINE: 0.2 E.U./DL
WBC # BLD: 15 K/UL (ref 4–11)
WBC UA: ABNORMAL /HPF (ref 0–5)

## 2022-02-12 PROCEDURE — 85025 COMPLETE CBC W/AUTO DIFF WBC: CPT

## 2022-02-12 PROCEDURE — 6360000002 HC RX W HCPCS: Performed by: EMERGENCY MEDICINE

## 2022-02-12 PROCEDURE — 96376 TX/PRO/DX INJ SAME DRUG ADON: CPT

## 2022-02-12 PROCEDURE — 80048 BASIC METABOLIC PNL TOTAL CA: CPT

## 2022-02-12 PROCEDURE — 6370000000 HC RX 637 (ALT 250 FOR IP): Performed by: EMERGENCY MEDICINE

## 2022-02-12 PROCEDURE — 2580000003 HC RX 258: Performed by: EMERGENCY MEDICINE

## 2022-02-12 PROCEDURE — 51798 US URINE CAPACITY MEASURE: CPT

## 2022-02-12 PROCEDURE — 96365 THER/PROPH/DIAG IV INF INIT: CPT

## 2022-02-12 PROCEDURE — 99283 EMERGENCY DEPT VISIT LOW MDM: CPT

## 2022-02-12 PROCEDURE — 96375 TX/PRO/DX INJ NEW DRUG ADDON: CPT

## 2022-02-12 PROCEDURE — 81001 URINALYSIS AUTO W/SCOPE: CPT

## 2022-02-12 PROCEDURE — 51702 INSERT TEMP BLADDER CATH: CPT

## 2022-02-12 RX ORDER — FUROSEMIDE 10 MG/ML
20 INJECTION INTRAMUSCULAR; INTRAVENOUS ONCE
Status: COMPLETED | OUTPATIENT
Start: 2022-02-12 | End: 2022-02-12

## 2022-02-12 RX ORDER — SODIUM CHLORIDE, SODIUM LACTATE, POTASSIUM CHLORIDE, AND CALCIUM CHLORIDE .6; .31; .03; .02 G/100ML; G/100ML; G/100ML; G/100ML
1000 INJECTION, SOLUTION INTRAVENOUS ONCE
Status: COMPLETED | OUTPATIENT
Start: 2022-02-12 | End: 2022-02-12

## 2022-02-12 RX ORDER — LIDOCAINE HYDROCHLORIDE 20 MG/ML
JELLY TOPICAL ONCE
Status: COMPLETED | OUTPATIENT
Start: 2022-02-12 | End: 2022-02-12

## 2022-02-12 RX ADMIN — LIDOCAINE HYDROCHLORIDE: 20 JELLY TOPICAL at 06:50

## 2022-02-12 RX ADMIN — HYDROMORPHONE HYDROCHLORIDE 1 MG: 1 INJECTION, SOLUTION INTRAMUSCULAR; INTRAVENOUS; SUBCUTANEOUS at 09:17

## 2022-02-12 RX ADMIN — DEXTROSE MONOHYDRATE 1000 MG: 5 INJECTION INTRAVENOUS at 13:48

## 2022-02-12 RX ADMIN — HYDROMORPHONE HYDROCHLORIDE 1 MG: 1 INJECTION, SOLUTION INTRAMUSCULAR; INTRAVENOUS; SUBCUTANEOUS at 07:39

## 2022-02-12 RX ADMIN — SODIUM CHLORIDE, SODIUM LACTATE, POTASSIUM CHLORIDE, AND CALCIUM CHLORIDE 1000 ML: .6; .31; .03; .02 INJECTION, SOLUTION INTRAVENOUS at 11:52

## 2022-02-12 RX ADMIN — FUROSEMIDE 20 MG: 10 INJECTION, SOLUTION INTRAVENOUS at 10:40

## 2022-02-12 RX ADMIN — SODIUM CHLORIDE, POTASSIUM CHLORIDE, SODIUM LACTATE AND CALCIUM CHLORIDE 1000 ML: 600; 310; 30; 20 INJECTION, SOLUTION INTRAVENOUS at 10:41

## 2022-02-12 ASSESSMENT — PAIN SCALES - GENERAL
PAINLEVEL_OUTOF10: 8
PAINLEVEL_OUTOF10: 9

## 2022-02-12 NOTE — ED NOTES
Unable to place catheter. Attempted 16F/14F/14coude without success. MD aware.       Diane Quinones RN  02/12/22 8529

## 2022-02-12 NOTE — ED NOTES
Nursing supervisor made aware that urology cart is needed at bedside.       Timoteo Whitney RN  02/12/22 9380

## 2022-02-12 NOTE — ED NOTES
Patient prepared for and ready to be discharged. Patient discharged at this time in no acute distress after verbalizing understanding of discharge instructions. Patient left after receiving After Visit Summary instructions.       John Vasquez RN  02/12/22 7476

## 2022-02-12 NOTE — ED NOTES
Pt has small amount of blood around the coates cath at the meatus. Dr Morena Espinal advised.      Hailee Ridley, RN  02/12/22 1757

## 2022-02-12 NOTE — ED PROVIDER NOTES
[address] [facility] ENCOUNTER          ATTENDING PHYSICIAN NOTE       Date of evaluation: 2/12/2022    ADDENDUM:      Care of this patient was assumed from Dr. Marva Chavis. The patient was seen for Urinary Retention  . The patient's initial evaluation and plan have been discussed with the prior provider who initially evaluated the patient. Please see the original HPI and MDM for full details. Nursing Notes, Past Medical Hx, Past Surgical Hx, Social Hx, Allergies, and Family Hx were all reviewed.     Diagnostic Results       RADIOLOGY:  POC US POST VOID RESIDUAL   Final Result          LABS:   Results for orders placed or performed during the hospital encounter of 02/12/22   Urinalysis, reflex to microscopic   Result Value Ref Range    Color, UA RED (A) Straw/Yellow    Clarity, UA Clear Clear    Glucose, Ur Negative Negative mg/dL    Bilirubin Urine Negative Negative    Ketones, Urine Negative Negative mg/dL    Specific Gravity, UA 1.015 1.005 - 1.030    Blood, Urine LARGE (A) Negative    pH, UA 6.5 5.0 - 8.0    Protein, UA TRACE (A) Negative mg/dL    Urobilinogen, Urine 0.2 <2.0 E.U./dL    Nitrite, Urine Negative Negative    Leukocyte Esterase, Urine Negative Negative    Microscopic Examination YES     Urine Type Other    CBC Auto Differential   Result Value Ref Range    WBC 15.0 (H) 4.0 - 11.0 K/uL    RBC 4.76 4.20 - 5.90 M/uL    Hemoglobin 13.6 13.5 - 17.5 g/dL    Hematocrit 40.4 (L) 40.5 - 52.5 %    MCV 85.0 80.0 - 100.0 fL    MCH 28.5 26.0 - 34.0 pg    MCHC 33.6 31.0 - 36.0 g/dL    RDW 16.9 (H) 12.4 - 15.4 %    Platelets 850 (H) 178 - 450 K/uL    MPV 7.1 5.0 - 10.5 fL    Neutrophils % 91.5 %    Lymphocytes % 5.0 %    Monocytes % 3.3 %    Eosinophils % 0.0 %    Basophils % 0.2 %    Neutrophils Absolute 13.7 (H) 1.7 - 7.7 K/uL    Lymphocytes Absolute 0.7 (L) 1.0 - 5.1 K/uL    Monocytes Absolute 0.5 0.0 - 1.3 K/uL    Eosinophils Absolute 0.0 0.0 - 0.6 K/uL    Basophils Absolute 0.0 0.0 - 0.2 K/uL   Basic Metabolic Panel w/ Reflex to MG   Result Value Ref Range    Sodium 135 (L) 136 - 145 mmol/L    Potassium reflex Magnesium 4.1 3.5 - 5.1 mmol/L    Chloride 96 (L) 99 - 110 mmol/L    CO2 24 21 - 32 mmol/L    Anion Gap 15 3 - 16    Glucose 175 (H) 70 - 99 mg/dL    BUN 29 (H) 7 - 20 mg/dL    CREATININE 1.4 (H) 0.8 - 1.3 mg/dL    GFR Non-African American 50 (A) >60    GFR African American >60 >60    Calcium 9.5 8.3 - 10.6 mg/dL   Microscopic Urinalysis   Result Value Ref Range    WBC, UA 0-2 0 - 5 /HPF    RBC, UA 21-50 (A) 0 - 4 /HPF       RECENT VITALS:  BP: 130/79, Temp: 98.4 °F (36.9 °C), Pulse: 76, Resp: 16, SpO2: 100 %     Procedures   Procedures    ED Course     The patient was given the following medications:  Orders Placed This Encounter   Medications    lidocaine (XYLOCAINE) 2 % uro-jet    HYDROmorphone (DILAUDID) injection 1 mg    lactated ringers bolus    furosemide (LASIX) injection 20 mg    lactated ringers bolus    cefTRIAXone (ROCEPHIN) 1000 mg IVPB in 50 mL D5W minibag     Order Specific Question:   Antimicrobial Indications     Answer:   Surgical Prophylaxis     Order Specific Question:   Antimicrobial Indications     Answer:   Urinary Tract Infection       CONSULTS:  IP CONSULT TO UROLOGY  IP CONSULT TO [address] [place] [address] / ASSESSMENT / Anderi Alexander is a 66 y.o. male with a history of prostate cancer who presented to the emergency department today for urinary retention. Please see the original HPI and MDM for full details. At time of signout, patient was pending attempts at Leblanc catheter placement by the nursing staff. Unfortunately, despite multiple attempts with regular and coudé catheters, we were unable to place a Leblanc. He was given hydromorphone and further attempts were unsuccessful. As such urology was consulted who were able to pass a catheter after dilation. This resulted in some bleeding and his Leblanc was placed to traction.   He was given IV fluids and diuresis at urology's request to help flush any residual bleeding, as continuous irrigation is not possible with the current catheter. Urine continues to clear and was slightly pink-tinged after IV fluids and diuresis. His traction was released and he had slight oozing around the catheter. I discussed these findings with urology, Dr. Christi Thompson, who was comfortable with this and did request IV ceftriaxone due to instrumentation earlier. Pressure was applied to the perineum with a sheet roll and bleeding has continued to improve. Discussed strict return precautions and patient is comfortable with care of the catheter at home. He was provided additional bags and supplies as needed. Discharged in stable condition with written and verbal instructions for which to return to the ED. Clinical Impression     1. Acute urinary retention    2.  Prostate cancer [facility])        Disposition     PATIENT REFERRED TO:  [facility]  [address] [place] [address]  [phone]    If symptoms worsen    Aydin Olguin MD  [place] [address]  [facility] [address] [place] [address]  [phone]    Schedule an appointment as soon as possible for a visit in 5 days  For reexamination      DISCHARGE MEDICATIONS:  New Prescriptions    No medications on file       DISPOSITION Decision To Discharge 02/12/2022 01:51:14 Raúl Salcedo MD  02/12/22 0673

## 2022-02-12 NOTE — ED NOTES
Multiple attempts to place coude and non coude coates unsuccessful. Pt medicated for pain. Bladder distended, scrotal tissue swollen.       Bong Hinojosa RN  02/12/22 1040

## 2022-02-12 NOTE — CONSULTS
Urology Attending Consult Note      Reason for Consultation: unable to place Leblanc    History:   Yuliana Gregg Is a 66 y.o. male with recently diagnosed very high risk prostate cancer. He is being seen by Dr. Sixto Alonzo with plans for the patient to initiate ADT and undergo XRT for the cancer. Over the last few days he began having an increasingly difficult time voiding. This morning he could not urinate at all despite the urge to do so. He came to the ED where multiple attempts by nursing staff were unsuccessful. Past Medical History:   Diagnosis Date    Asthma     Environmental allergies     Inguinal hernia        Past Surgical History:   Procedure Laterality Date    INGUINAL HERNIA REPAIR Bilateral 6/20/2019    LAPAROSCOPIC BILATERAL INGUINAL HERNIA REPAIR WITH MESH performed by Zechariah Martinez MD at [address] [place] [address]        No family history on file. Social History     Socioeconomic History    Marital status: Single     Spouse name: Not on file    Number of children: Not on file    Years of education: Not on file    Highest education level: Not on file   Occupational History    Not on file   Tobacco Use    Smoking status: Never Smoker    Smokeless tobacco: Never Used   Substance and Sexual Activity    Alcohol use: Yes     Comment: occ     Drug use: No    Sexual activity: Not on file   Other Topics Concern    Not on file   Social History Narrative    Not on file     Social Determinants of Health     Financial Resource Strain:     Difficulty of Paying Living Expenses: Not on file   Food Insecurity:     Worried About Running Out of Food in the Last Year: Not on file    Francisca of Food in the Last Year: Not on file   Transportation Needs:     Lack of Transportation (Medical): Not on file    Lack of Transportation (Non-Medical):  Not on file   Physical Activity:     Days of Exercise per Week: Not on file    Minutes of Exercise per Session: Not on file   Stress:     Feeling of Stress : Not on file   Social Connections:     Frequency of Communication with Friends and Family: Not on file    Frequency of Social Gatherings with Friends and Family: Not on file    Attends Oriental orthodox Services: Not on file    Active Member of Clubs or Organizations: Not on file    Attends Club or Organization Meetings: Not on file    Marital Status: Not on file   Intimate Partner Violence:     Fear of Current or Ex-Partner: Not on file    Emotionally Abused: Not on file    Physically Abused: Not on file    Sexually Abused: Not on file   Housing Stability:     Unable to Pay for Housing in the Last Year: Not on file    Number of [place] in the Last Year: Not on file    Unstable Housing in the Last Year: Not on file         Review of Systems:  12 system ROS was performed and negative except as noted in HPI. Vitals:  /79   Pulse 76   Temp 98.4 °F (36.9 °C)   Resp 16   Ht 5' 6\" (1.676 m)   Wt 150 lb (68 kg)   SpO2 100%   BMI 24.21 kg/m²   Temp  Av.4 °F (36.9 °C)  Min: 98.4 °F (36.9 °C)  Max: 98.4 °F (36.9 °C)    Intake/Output Summary (Last 24 hours) at 2022 1321  Last data filed at 2022 1144  Gross per 24 hour   Intake --   Output 1100 ml   Net -1100 ml         Physical:  /79   Pulse 76   Temp 98.4 °F (36.9 °C)   Resp 16   Ht 5' 6\" (1.676 m)   Wt 150 lb (68 kg)   SpO2 100%   BMI 24.21 kg/m²    Constitutional: NAD, WDWN. HEENT: NCAT. Conjunctivae normal. MMM. Cardiovascular: Regular rate. Pulmonary/Chest: Respirations are even and non-labored bilaterally. No audible wheezing. Abdominal: Soft. Suprapubic distension, mild tenderness. Back: No CVA tenderness. Neurological: A + O x 3. Cranial Nerves II-XII grossly intact. Extremities: BRITTANEY x 4, Warm. No edema.      Labs:  WBC:    Lab Results   Component Value Date    WBC 15.0 2022     Hemoglobin/Hematocrit:    Lab Results   Component Value Date    HGB 13.6 2022    HCT 40.4 02/12/2022     BMP:    Lab Results   Component Value Date     02/12/2022    K 4.1 02/12/2022    CL 96 02/12/2022    CO2 24 02/12/2022    BUN 29 02/12/2022    LABALBU 4.4 11/08/2021    CREATININE 1.4 02/12/2022    CALCIUM 9.5 02/12/2022    GFRAA >60 02/12/2022    LABGLOM 50 02/12/2022     PT/INR:    Lab Results   Component Value Date    PROTIME 11.0 06/20/2019    INR 0.96 06/20/2019     PTT:    Lab Results   Component Value Date    APTT 32.2 06/20/2019   [APTT    Urinalysis:   Lab Results   Component Value Date    COLORU RED 02/12/2022    NITRU Negative 02/12/2022    GLUCOSEU Negative 02/12/2022    KETUA Negative 02/12/2022    UROBILINOGEN 0.2 02/12/2022    BILIRUBINUR Negative 02/12/2022        Imaging:   POC US POST VOID RESIDUAL  POCUS_Bladder_Retention:        Exam Information:          Exam type:  Clinically indicated        Indication(s) for Exam:          The exam was performed with the following indications[de-identified]  Decreased urine output        Views Obtained & Images Saved for These Views:          Transverse view, Sagittal (longitudinal) view        Findings:          Post Void Bladder Volume (mL) =:  500        Interpretation:          Urinary Retention    Electronically signed by Freddie Gómez on Saturday, February 12, 2022 at 7:35 AM        Impression/Plan:   66 y.o. M w/ locally aggressive prostate CA p/w AUR and difficult catheterization. I was also unable to place a Leblanc. I was able to get a wire into the bladder, but I could not advance a councill catheter due to what felt like a thick urethral stricture. I manually dilated the stricture to 26 Fr and was able to get an 18 Fr councill catheter in after that. There was some hematuria from this and the catheter was put on traction. 1. Keep Leblanc on traction for the next hour or two  2. IVF bolus and IV Lasix  3. Rocephin x1  4. OK to discharge if urine clears up; can admit for obs if bleeding remains heavy  5.  Leblanc to stay for the next 3 days at least. We will arrange for follow up for removal and discussion of the next steps. We may need to rethink the plan for XRT at this point (set to start in 2 weeks or so) given this new finding of a stricture that was dilated.     Sigifredo Doyle MD

## 2022-02-13 NOTE — ED PROVIDER NOTES
[address] ENCOUNTER          ATTENDING PHYSICIAN NOTE       Date of evaluation: 2/12/2022    Chief Complaint     Urinary Retention      History of Present Illness     Edson Andersen is a 66 y.o. male who presents to the ED today with inability to urinate since yesterday. Denies fever or chills. Reports no blood in his urine recently. Has hx of prostate ca currently seeing urology but has not undergone treatment yet. Reports pain in the suprapubic abdomen. Has been eating and drinking normally. Review of Systems     Review of Systems  All systems were reviewed with the patient/family and negative except as otherwise documented in the HPI. Past Medical, Surgical, Family, and Social History     He has a past medical history of Asthma, Environmental allergies, and Inguinal hernia. He has a past surgical history that includes lipoma resection and Inguinal hernia repair (Bilateral, 6/20/2019). His family history is not on file. He reports that he has never smoked. He has never used smokeless tobacco. He reports current alcohol use. He reports that he does not use drugs. Medications     Discharge Medication List as of 2/12/2022  2:12 PM      CONTINUE these medications which have NOT CHANGED    Details   tamsulosin (FLOMAX) 0.4 MG capsule TAKE 1 CAPSULE BY MOUTH DAILY, Disp-30 capsule, R-1Normal      lisinopril-hydroCHLOROthiazide (PRINZIDE;ZESTORETIC) 20-12.5 MG per tablet Take 1 tablet by mouth daily, Disp-90 tablet, R-1Normal      Blood Pressure KIT 2 TIMES DAILY Starting Mon 11/8/2021, Disp-1 kit, R-0, Print             Allergies     He is allergic to other. Physical Exam     INITIAL VITALS: BP: (!) 145/74, Temp: 98.4 °F (36.9 °C), Pulse: 89, Resp: 20, SpO2: 98 %   Physical Exam  Constitutional:  Thin elderly male, sitting up at the edge of the stretcher uncomfortable   Eyes:  Sclera anicteric.   HEENT:  Normocephalic, oropharynx moist.   Neck: normal range of motion, supple. Respiratory:  Even and non-labored, no distress   Cardiovascular:  Extremities warm, well perfused  GI:  Soft, nondistended, ttp in the suprapubic abdomen, no rebound or guarding. Musculoskeletal:  No edema, no deformities. Skin:  No rash, no lesions, no pallor   Neurologic:  Awake and alert. Moving all extremities purposefully and with grossly normal coordination. Psychiatric:  Normal mood. Behavior appropriate.       Diagnostic Results       RADIOLOGY:  POC US POST VOID RESIDUAL   Final Result          LABS:   Results for orders placed or performed during the hospital encounter of 02/12/22   Urinalysis, reflex to microscopic   Result Value Ref Range    Color, UA RED (A) Straw/Yellow    Clarity, UA Clear Clear    Glucose, Ur Negative Negative mg/dL    Bilirubin Urine Negative Negative    Ketones, Urine Negative Negative mg/dL    Specific Gravity, UA 1.015 1.005 - 1.030    Blood, Urine LARGE (A) Negative    pH, UA 6.5 5.0 - 8.0    Protein, UA TRACE (A) Negative mg/dL    Urobilinogen, Urine 0.2 <2.0 E.U./dL    Nitrite, Urine Negative Negative    Leukocyte Esterase, Urine Negative Negative    Microscopic Examination YES     Urine Type Other    CBC Auto Differential   Result Value Ref Range    WBC 15.0 (H) 4.0 - 11.0 K/uL    RBC 4.76 4.20 - 5.90 M/uL    Hemoglobin 13.6 13.5 - 17.5 g/dL    Hematocrit 40.4 (L) 40.5 - 52.5 %    MCV 85.0 80.0 - 100.0 fL    MCH 28.5 26.0 - 34.0 pg    MCHC 33.6 31.0 - 36.0 g/dL    RDW 16.9 (H) 12.4 - 15.4 %    Platelets 125 (H) 106 - 450 K/uL    MPV 7.1 5.0 - 10.5 fL    Neutrophils % 91.5 %    Lymphocytes % 5.0 %    Monocytes % 3.3 %    Eosinophils % 0.0 %    Basophils % 0.2 %    Neutrophils Absolute 13.7 (H) 1.7 - 7.7 K/uL    Lymphocytes Absolute 0.7 (L) 1.0 - 5.1 K/uL    Monocytes Absolute 0.5 0.0 - 1.3 K/uL    Eosinophils Absolute 0.0 0.0 - 0.6 K/uL    Basophils Absolute 0.0 0.0 - 0.2 K/uL   Basic Metabolic Panel w/ Reflex to MG   Result Value Ref Range    Sodium 135 (L) 136 - 145 mmol/L    Potassium reflex Magnesium 4.1 3.5 - 5.1 mmol/L    Chloride 96 (L) 99 - 110 mmol/L    CO2 24 21 - 32 mmol/L    Anion Gap 15 3 - 16    Glucose 175 (H) 70 - 99 mg/dL    BUN 29 (H) 7 - 20 mg/dL    CREATININE 1.4 (H) 0.8 - 1.3 mg/dL    GFR Non-African American 50 (A) >60    GFR African American >60 >60    Calcium 9.5 8.3 - 10.6 mg/dL   Microscopic Urinalysis   Result Value Ref Range    WBC, UA 0-2 0 - 5 /HPF    RBC, UA 21-50 (A) 0 - 4 /HPF       ED BEDSIDE ULTRASOUND:  Bladder US with >500cc post void    RECENT VITALS:  BP: 127/69,Temp: 98.4 °F (36.9 °C), Pulse: 67, Resp: 15, SpO2: 97 %     Procedures         ED Course     Nursing Notes, Past Medical Hx, Past Surgical Hx, Social Hx,Allergies, and Family Hx were reviewed. patient was given the following medications:  Orders Placed This Encounter   Medications    lidocaine (XYLOCAINE) 2 % uro-jet    HYDROmorphone (DILAUDID) injection 1 mg    lactated ringers bolus    furosemide (LASIX) injection 20 mg    lactated ringers bolus    cefTRIAXone (ROCEPHIN) 1000 mg IVPB in 50 mL D5W minibag     Order Specific Question:   Antimicrobial Indications     Answer:   Surgical Prophylaxis     Order Specific Question:   Antimicrobial Indications     Answer:   Urinary Tract Infection       CONSULTS:  IP CONSULT TO UROLOGY  IP CONSULT TO [facility] / ASSESSMENT / Beth Rosie is a 66 y.o. male presents to the ED today with complaints of suprapubic pain and inability to urinate. He is afebrile, HD stable on arrival. TTP in the bladder area, bedside US with signs of retention and enlarged prostate. Adwoa Perrin provided and will plan to place coates catheter. Screening labs sent. Pt signed out to oncoming provider awaiting results of studies. .      Clinical Impression     1. Acute urinary retention    2.  Prostate cancer [facility])        Disposition     PATIENT REFERRED TO:  [facility] Emergency Department  [address] [facility]

## 2022-02-21 ENCOUNTER — HOSPITAL ENCOUNTER (EMERGENCY)
Age: 72
Discharge: HOME | End: 2022-02-21
Attending: EMERGENCY MEDICINE
Payer: MEDICARE

## 2022-02-21 VITALS
SYSTOLIC BLOOD PRESSURE: 151 MMHG | OXYGEN SATURATION: 97 % | WEIGHT: 157 LBS | TEMPERATURE: 98.2 F | DIASTOLIC BLOOD PRESSURE: 87 MMHG | HEART RATE: 86 BPM | BODY MASS INDEX: 25.34 KG/M2 | RESPIRATION RATE: 18 BRPM

## 2022-02-21 DIAGNOSIS — R33.9 URINARY RETENTION: Primary | ICD-10-CM

## 2022-02-21 LAB
BACTERIA: ABNORMAL /HPF
BILIRUBIN URINE: NEGATIVE
BLOOD, URINE: ABNORMAL
CLARITY: CLEAR
COLOR: YELLOW
GLUCOSE URINE: NEGATIVE MG/DL
HYALINE CASTS: ABNORMAL /LPF (ref 0–2)
KETONES, URINE: NEGATIVE MG/DL
LEUKOCYTE ESTERASE, URINE: ABNORMAL
MICROSCOPIC EXAMINATION: YES
MUCUS: ABNORMAL /LPF
NITRITE, URINE: NEGATIVE
PH UA: 6.5 (ref 5–8)
PROTEIN UA: 30 MG/DL
RBC UA: ABNORMAL /HPF (ref 0–4)
SPECIFIC GRAVITY UA: 1.01 (ref 1–1.03)
URINE TYPE: ABNORMAL
UROBILINOGEN, URINE: 0.2 E.U./DL
WBC UA: ABNORMAL /HPF (ref 0–5)

## 2022-02-21 PROCEDURE — 6370000000 HC RX 637 (ALT 250 FOR IP): Performed by: STUDENT IN AN ORGANIZED HEALTH CARE EDUCATION/TRAINING PROGRAM

## 2022-02-21 PROCEDURE — 96372 THER/PROPH/DIAG INJ SC/IM: CPT

## 2022-02-21 PROCEDURE — 99284 EMERGENCY DEPT VISIT MOD MDM: CPT

## 2022-02-21 PROCEDURE — 6360000002 HC RX W HCPCS

## 2022-02-21 PROCEDURE — 51798 US URINE CAPACITY MEASURE: CPT

## 2022-02-21 PROCEDURE — 51702 INSERT TEMP BLADDER CATH: CPT

## 2022-02-21 PROCEDURE — 81001 URINALYSIS AUTO W/SCOPE: CPT

## 2022-02-21 RX ORDER — MORPHINE SULFATE 4 MG/ML
4 INJECTION, SOLUTION INTRAMUSCULAR; INTRAVENOUS ONCE
Status: COMPLETED | OUTPATIENT
Start: 2022-02-21 | End: 2022-02-21

## 2022-02-21 RX ORDER — LIDOCAINE HYDROCHLORIDE 20 MG/ML
JELLY TOPICAL PRN
Status: DISCONTINUED | OUTPATIENT
Start: 2022-02-21 | End: 2022-02-21 | Stop reason: HOSPADM

## 2022-02-21 RX ORDER — MORPHINE SULFATE 4 MG/ML
INJECTION, SOLUTION INTRAMUSCULAR; INTRAVENOUS
Status: COMPLETED
Start: 2022-02-21 | End: 2022-02-21

## 2022-02-21 RX ADMIN — LIDOCAINE HYDROCHLORIDE: 20 JELLY TOPICAL at 18:55

## 2022-02-21 RX ADMIN — MORPHINE SULFATE 4 MG: 4 INJECTION, SOLUTION INTRAMUSCULAR; INTRAVENOUS at 18:57

## 2022-02-21 RX ADMIN — MORPHINE SULFATE 4 MG: 4 INJECTION INTRAVENOUS at 18:57

## 2022-02-21 ASSESSMENT — PAIN SCALES - GENERAL
PAINLEVEL_OUTOF10: 8
PAINLEVEL_OUTOF10: 0

## 2022-02-21 NOTE — ED PROVIDER NOTES
[address] [facility] RESIDENT NOTE       Date of evaluation: 2/21/2022    Chief Complaint     Urinary Retention (presents to ED after visit at urologist and states had fully catheter pulled around 1pm and states has had diffuculty fully emptying bladder since then. )      History of Present Illness     Heron Arzate is a 66 y.o. male with a PMHx of asthma, allergies, inguinal hernia (bilateral s/p repair), recently diagnosed prostate Ca who presents to the emergency department today complaining of urinary retention. Of note, the patient states that he was seen 2 weeks ago in the emergency department for similar symptoms. At this time, the patient states that he was unable to urinate and had significant abdominal pressure/distention as a result. At this time, he states that the nurse failed to place the urinary catheter multiple times and urology was consulted. At this time, they had to attempt to place a urinary catheter over a wire multiple times before being successful. Patient followed up with his urologist outpatient and had his urinary catheter removed this afternoon at 1 PM.  Patient was told that if he does not urinate by about 4 PM to call back to the urology group for further evaluation. However, when the patient called, he was told that they were closed and thus unable to help him. Patient came to the emergency department for further evaluation. Patient states that he has now had 2 L of water in addition to a can of Coke without any urination over the last 4-1/2 hours. Patient states that he is starting to have increased bladder fullness/lower abdominal pain that feels like pressure. He describes the pain as a 4 out of 10 in severity but states that is just going to get worse and worse as he is unable to relieve his urine.   Patient states that he did have a normal bowel movement about an hour ago which did provide some alleviation of the pressure in his abdomen. Otherwise, denies any nausea or vomiting. He denies any fevers, aches, or chills. No chest pain, chest tightness or shortness of breath. He is otherwise been in his usual state of health. Patient is due to start radiation therapy on March 7.    Other than that mentioned above, there are no other alleviating or provoking factors associated with the patient's presentation today. Review of Systems     Review of Systems    Review of systems is positive for urinary retention   Review of systems is negative for f/a/c  Further review of systems is negative other than that mentioned in the HPI. Past Medical, Surgical, Family, and Social History     He has a past medical history of Asthma, Environmental allergies, and Inguinal hernia. He has a past surgical history that includes lipoma resection and Inguinal hernia repair (Bilateral, 6/20/2019). His family history is not on file. He reports that he has never smoked. He has never used smokeless tobacco. He reports current alcohol use. He reports that he does not use drugs. Medications     Previous Medications    BLOOD PRESSURE KIT    1 kit by Does not apply route 2 times daily    LISINOPRIL-HYDROCHLOROTHIAZIDE (PRINZIDE;ZESTORETIC) 20-12.5 MG PER TABLET    Take 1 tablet by mouth daily    TAMSULOSIN (FLOMAX) 0.4 MG CAPSULE    TAKE 1 CAPSULE BY MOUTH DAILY       Allergies     He is allergic to other. Physical Exam     INITIAL VITALS: BP: (!) 181/89, Temp: 98.2 °F (36.8 °C), Pulse: 90, Resp: 16, SpO2: 95 %     Gen: NAD, in bed comfortably, non-diaphoretic   Head/Eyes: Atraumatic. PERRL. EOMI bilaterally. No conjunctival injection or scleral icterus   ENT: Neck supple, trachea midline, no LAD. MMM, no posterior oropharyngeal erythema or exudate. External auditory meatus clear without discharge or erythema. No nasal congestion or discharge. Cardiovascular: RRR no murmurs, rubs, or gallops.    Pulmonary:Lungs CTAB, no rales, wheezes, or ronchi Abdominal: Soft, non-tender. No rebound or guarding. Non-peritoneal   :  examination performed with chaperone at bedside. On evaluation, the patient is noted to have some blood at the tip of the urethral meatus though unable to evacuate any blood from the urethra on my physical examination. There is also blood noted on the patient's underwear. Patient states that this has been bleeding ever since having the catheter removed by urology. He has no testicular pain or fullness. No scrotal fullness. No lesions or rashes. MSK: no obvious long bone deformity. Skin:  Warm, dry. No rashes, ecchymosis or cyanosis. Neuro: Alert and oriented x 4. Cranial nerves grossly intact. Moving all extremities equally. Gait narrow and stable. Extremities:  No peripheral edema. Psych: Euthymic affect. Normal rate and rhythm of speech with full prosody. Linear thought process. DiagnosticResults     EKG   None    RADIOLOGY:  No orders to display       LABS:   No results found for this visit on 02/21/22. ED BEDSIDE ULTRASOUND:  None    RECENT VITALS:  BP: (!) 181/89, Temp: 98.2 °F (36.8 °C), Pulse: 86,Resp: 18, SpO2: 97 %     Procedures     None    ED Course     Nursing Notes, Past Medical Hx, Past Surgical Hx, Social Hx, Allergies, and Family Hx were reviewed. The patient was given the followingmedications:  Orders Placed This Encounter   Medications    lidocaine (XYLOCAINE) 2 % uro-jet    morphine (PF) injection 4 mg    morphine 4 MG/ML injection     Valeen Sink: cabinet override       CONSULTS:  None         [address] / ASSESSMENT / Debbie Suzette is a 66 y.o. Lorence Session a history of present illness, physical examination, past medical history as noted above who presents to the emergency department today with urinary retention in setting of prostate cancer.   Patient is a 69-year-old male with history of recently diagnosed prostate cancer complicated by urinary retention requiring placement of urinary catheter by urology presenting to the emergency department today with worsening urinary retention after having urinary catheter withdrawn outpatient. On physical examination, the patient does have some tenderness palpation in the suprapubic region and suprapubic fullness, consistent with his inability to urinate. Bladder scan was performed at bedside with results pending at sign out. Attempted to place a coudé catheter here in the emergency department. UA will be obtained. Should catheter placement be unsuccessful, patient will require urology consult. Patient without additional symptoms. PE otherwise unremarkable and thus do not believe patient requires further laboratory or imaging evaluation at this time. At this time I am going off service and the patient's care has been signed over to my colleague, Dr. Lise Donohue. Their responsibilities include:     - Follow up Leblanc placement   - Urology consultation   - Reassessment   - Disposition       This patient was also evaluated by the attending physician. All care plans werediscussed and agreed upon. Clinical Impression     1. Urinary retention        Disposition     PATIENT REFERRED TO:  No follow-up provider specified.     DISCHARGE MEDICATIONS:  New Prescriptions    No medications on file       DISPOSITION       Edilson Jane MD  02/21/22 0801

## 2022-02-21 NOTE — ED PROVIDER NOTES
[address] [facility] RESIDENT NOTE     Date of evaluation: 2/21/2022    ADDENDUM:      Care of this patient was assumed from Dr. Sujata Mari. The patient was seen for Urinary Retention (presents to ED after visit at urologist and states had fully catheter pulled around 1pm and states has had diffuculty fully emptying bladder since then. )  . The patient's initial evaluation and plan have been discussed with the prior provider who initially evaluated the patient. Nursing Notes, Past Medical Hx, Past Surgical Hx, Social Hx, Allergies, and Family Hx were all reviewed. Recent prostate CA. No chemo or radiation. 2 weeks prior seen in ED with Leblanc catheter placed. Evaluated by urology, believe 2/2 prostate CA. 1pm catheter pulled and told to call office if no urination by 4pm. 2 L water and can of soda. Not able to urinate. Abd discomfort.      Diagnostic Results         RADIOLOGY:  No orders to display       LABS:   Results for orders placed or performed during the hospital encounter of 02/21/22   Urinalysis   Result Value Ref Range    Color, UA Yellow Straw/Yellow    Clarity, UA Clear Clear    Glucose, Ur Negative Negative mg/dL    Bilirubin Urine Negative Negative    Ketones, Urine Negative Negative mg/dL    Specific Gravity, UA 1.015 1.005 - 1.030    Blood, Urine MODERATE (A) Negative    pH, UA 6.5 5.0 - 8.0    Protein, UA 30 (A) Negative mg/dL    Urobilinogen, Urine 0.2 <2.0 E.U./dL    Nitrite, Urine Negative Negative    Leukocyte Esterase, Urine TRACE (A) Negative    Microscopic Examination YES     Urine Type NotGiven    Microscopic Urinalysis   Result Value Ref Range    Hyaline Casts, UA 0-2 0 - 2 /LPF    Mucus, UA Rare (A) None Seen /LPF    WBC, UA 3-5 0 - 5 /HPF    RBC, UA 11-20 (A) 0 - 4 /HPF    Bacteria, UA 2+ (A) None Seen /HPF       RECENT VITALS:  BP: (!) 151/87, Temp: 98.2 °F (36.8 °C), Pulse: 86, Resp: 18, SpO2: 97 %     Procedures     None    ED Course The patient was given the following medications:  Orders Placed This Encounter   Medications    DISCONTD: lidocaine (XYLOCAINE) 2 % uro-jet    morphine (PF) injection 4 mg    morphine 4 MG/ML injection     Terell Humphreys: rebeca override       CONSULTS:  None    [address] [facility] / ASSESSMENT / Rashmi Richard is a 66 y.o. male with recent diagnosis prostate cancer who presented to the ED with urinary retention after having Leblanc catheter removed earlier today at urology office. Leblanc catheter was placed by the attending physician with copious amounts of urine out. Pending at this time her urinalysis. Patient will need to be discharged with Leblanc catheter in place. He has leg bag supplies at home. He was given a leg bag here in the emergency department. UA with moderate blood, 30 protein, trace leukocyte esterase, no significant WBCs on micro, and no nitrites. Obstructive uropathy likely secondary to prostate cancer. Patient will need follow-up urgently with urology for further management of urinary retention. Patient states he will call his urologist in the morning. Discussed reasons to return to the ER. Patient is safe for discharge at this time. Patient is discharged home in stable condition. This patient was also evaluated by the attending physician. All care plans were discussed and agreed upon. Clinical Impression     1.  Urinary retention        Disposition     PATIENT REFERRED TO:  Urology    Call in 1 day        DISCHARGE MEDICATIONS:  Discharge Medication List as of 2/21/2022  8:03 PM          DISPOSITION Decision To Discharge 02/21/2022 07:54:46 PM       Deric Larry MD  Resident  02/22/22 1600

## 2022-02-22 NOTE — ED PROVIDER NOTES
ED Attending Attestation Note     Date of evaluation: 2/21/2022    This patient was seen by the resident. I have seen and examined the patient, agree with the workup, evaluation, management and diagnosis. The care plan has been discussed. My assessment reveals a 76-year-old male who presents to the emergency department with acute urinary retention. Patient has history of urinary retention requiring Leblanc placement which was complex and requiring urology instrumentation previously. He was seen in urology office today and Leblanc was removed. However he was discharged prior to his ability to spontaneously void. He was instructed to drink 2 L of water and to return to their office if he is unable to void. After many hours he was still unable to void and was developing pain. He contacted their office and they instructed him to come to the emergency department for management. Upon arrival the patient is in significant pain in his suprapubic region concerning for urinary retention. On examination the patient has a distended lower abdomen. He is speaking in complete sentences with no increased work of breathing. A Leblanc catheter was placed by myself with large release of urine. We will monitor and plan for discharge with a leg bag and urology follow-up.     Bright Reese MD MPH   Physician     Jonel Blackwell MD  02/21/22 [id number]

## 2022-03-07 DIAGNOSIS — R35.0 BENIGN PROSTATIC HYPERPLASIA WITH URINARY FREQUENCY: ICD-10-CM

## 2022-03-07 DIAGNOSIS — N40.1 BENIGN PROSTATIC HYPERPLASIA WITH URINARY FREQUENCY: ICD-10-CM

## 2022-03-09 RX ORDER — TAMSULOSIN HYDROCHLORIDE 0.4 MG/1
CAPSULE ORAL
Qty: 30 CAPSULE | Refills: 0 | Status: SHIPPED | OUTPATIENT
Start: 2022-03-09 | End: 2022-04-15

## 2022-03-29 ENCOUNTER — APPOINTMENT (OUTPATIENT)
Dept: GENERAL RADIOLOGY | Age: 72
DRG: 682 | End: 2022-03-29
Payer: MEDICARE

## 2022-03-29 ENCOUNTER — HOSPITAL ENCOUNTER (INPATIENT)
Age: 72
LOS: 6 days | Discharge: HOME HEALTH | DRG: 682 | End: 2022-04-04
Attending: EMERGENCY MEDICINE | Admitting: INTERNAL MEDICINE
Payer: MEDICARE

## 2022-03-29 ENCOUNTER — ANCILLARY PROCEDURE (OUTPATIENT)
Dept: EMERGENCY DEPT | Age: 72
DRG: 682 | End: 2022-03-29
Payer: MEDICARE

## 2022-03-29 ENCOUNTER — APPOINTMENT (OUTPATIENT)
Dept: CT IMAGING | Age: 72
DRG: 682 | End: 2022-03-29
Payer: MEDICARE

## 2022-03-29 ENCOUNTER — APPOINTMENT (OUTPATIENT)
Dept: ULTRASOUND IMAGING | Age: 72
DRG: 682 | End: 2022-03-29
Payer: MEDICARE

## 2022-03-29 DIAGNOSIS — C61 PROSTATE CANCER (HCC): ICD-10-CM

## 2022-03-29 DIAGNOSIS — E86.1 HYPOVOLEMIA: Primary | ICD-10-CM

## 2022-03-29 DIAGNOSIS — D69.6 THROMBOCYTOPENIA (HCC): ICD-10-CM

## 2022-03-29 DIAGNOSIS — N17.9 AKI (ACUTE KIDNEY INJURY) (HCC): ICD-10-CM

## 2022-03-29 DIAGNOSIS — E87.29 INCREASED ANION GAP METABOLIC ACIDOSIS: ICD-10-CM

## 2022-03-29 LAB
A/G RATIO: 0.8 (ref 1.1–2.2)
ABO/RH: NORMAL
ALBUMIN SERPL-MCNC: 2.5 G/DL (ref 3.4–5)
ALP BLD-CCNC: 226 U/L (ref 40–129)
ALT SERPL-CCNC: 78 U/L (ref 10–40)
ANION GAP SERPL CALCULATED.3IONS-SCNC: 21 MMOL/L (ref 3–16)
ANISOCYTOSIS: ABNORMAL
ANTIBODY SCREEN: NORMAL
APTT: 30.7 SEC (ref 26.2–38.6)
AST SERPL-CCNC: 170 U/L (ref 15–37)
BACTERIA: ABNORMAL /HPF
BANDED NEUTROPHILS RELATIVE PERCENT: 8 % (ref 0–7)
BASE EXCESS VENOUS: -8 MMOL/L (ref -2–3)
BASOPHILS ABSOLUTE: 0 K/UL (ref 0–0.2)
BASOPHILS ABSOLUTE: 0.1 K/UL (ref 0–0.2)
BASOPHILS RELATIVE PERCENT: 0 %
BASOPHILS RELATIVE PERCENT: 0.9 %
BILIRUB SERPL-MCNC: 1.3 MG/DL (ref 0–1)
BILIRUBIN URINE: ABNORMAL
BLOOD SMEAR REVIEW: NORMAL
BLOOD, URINE: ABNORMAL
BUN BLDV-MCNC: 139 MG/DL (ref 7–20)
CALCIUM SERPL-MCNC: 8.1 MG/DL (ref 8.3–10.6)
CARBOXYHEMOGLOBIN: 1.1 % (ref 0–1.5)
CHLORIDE BLD-SCNC: 85 MMOL/L (ref 99–110)
CLARITY: CLEAR
CO2: 15 MMOL/L (ref 21–32)
COLOR: YELLOW
CREAT SERPL-MCNC: 4.9 MG/DL (ref 0.8–1.3)
CREATININE URINE: 80.1 MG/DL (ref 39–259)
EKG ATRIAL RATE: 75 BPM
EKG DIAGNOSIS: NORMAL
EKG P AXIS: 59 DEGREES
EKG P-R INTERVAL: 164 MS
EKG Q-T INTERVAL: 400 MS
EKG QRS DURATION: 102 MS
EKG QTC CALCULATION (BAZETT): 446 MS
EKG R AXIS: 2 DEGREES
EKG T AXIS: 48 DEGREES
EKG VENTRICULAR RATE: 75 BPM
EOSINOPHILS ABSOLUTE: 0.1 K/UL (ref 0–0.6)
EOSINOPHILS ABSOLUTE: 0.1 K/UL (ref 0–0.6)
EOSINOPHILS RELATIVE PERCENT: 1 %
EOSINOPHILS RELATIVE PERCENT: 1.4 %
EPITHELIAL CELLS, UA: ABNORMAL /HPF (ref 0–5)
FERRITIN: 314.9 NG/ML (ref 30–400)
FIBRINOGEN: 856 MG/DL (ref 200–397)
GFR AFRICAN AMERICAN: 14
GFR NON-AFRICAN AMERICAN: 12
GLUCOSE BLD-MCNC: 145 MG/DL (ref 70–99)
GLUCOSE URINE: NEGATIVE MG/DL
HCO3 VENOUS: 16.7 MMOL/L (ref 24–28)
HCT VFR BLD CALC: 28.5 % (ref 40.5–52.5)
HCT VFR BLD CALC: 31.1 % (ref 40.5–52.5)
HCT VFR BLD CALC: 31.6 % (ref 40.5–52.5)
HEMOGLOBIN, VEN, REDUCED: 26 %
HEMOGLOBIN: 10.5 G/DL (ref 13.5–17.5)
HEMOGLOBIN: 10.6 G/DL (ref 13.5–17.5)
HEMOGLOBIN: 9.7 G/DL (ref 13.5–17.5)
IMMATURE RETIC FRACT: 0.22 (ref 0.21–0.37)
INR BLD: 1.04 (ref 0.88–1.12)
KETONES, URINE: ABNORMAL MG/DL
LACTATE DEHYDROGENASE: 288 U/L (ref 100–190)
LACTIC ACID: 1.1 MMOL/L (ref 0.4–2)
LACTIC ACID: 1.2 MMOL/L (ref 0.4–2)
LEUKOCYTE ESTERASE, URINE: ABNORMAL
LIPASE: 28 U/L (ref 13–60)
LYMPHOCYTES ABSOLUTE: 0.1 K/UL (ref 1–5.1)
LYMPHOCYTES ABSOLUTE: 0.2 K/UL (ref 1–5.1)
LYMPHOCYTES RELATIVE PERCENT: 1 %
LYMPHOCYTES RELATIVE PERCENT: 2.3 %
MCH RBC QN AUTO: 28.9 PG (ref 26–34)
MCH RBC QN AUTO: 28.9 PG (ref 26–34)
MCH RBC QN AUTO: 29.3 PG (ref 26–34)
MCHC RBC AUTO-ENTMCNC: 33.7 G/DL (ref 31–36)
MCHC RBC AUTO-ENTMCNC: 33.8 G/DL (ref 31–36)
MCHC RBC AUTO-ENTMCNC: 34.1 G/DL (ref 31–36)
MCV RBC AUTO: 85.6 FL (ref 80–100)
MCV RBC AUTO: 85.8 FL (ref 80–100)
MCV RBC AUTO: 86 FL (ref 80–100)
METHEMOGLOBIN VENOUS: 0.1 % (ref 0–1.5)
MICROSCOPIC EXAMINATION: YES
MONOCYTES ABSOLUTE: 0.1 K/UL (ref 0–1.3)
MONOCYTES ABSOLUTE: 0.2 K/UL (ref 0–1.3)
MONOCYTES RELATIVE PERCENT: 1 %
MONOCYTES RELATIVE PERCENT: 3 %
NEUTROPHILS ABSOLUTE: 6.2 K/UL (ref 1.7–7.7)
NEUTROPHILS ABSOLUTE: 6.6 K/UL (ref 1.7–7.7)
NEUTROPHILS RELATIVE PERCENT: 87 %
NEUTROPHILS RELATIVE PERCENT: 94.4 %
NITRITE, URINE: NEGATIVE
O2 SAT, VEN: 74 %
OSMOLALITY URINE: 397 MOSM/KG (ref 390–1070)
OSMOLALITY: 315 MOSM/KG (ref 278–305)
PCO2, VEN: 30.9 MMHG (ref 41–51)
PDW BLD-RTO: 16.1 % (ref 12.4–15.4)
PDW BLD-RTO: 16.2 % (ref 12.4–15.4)
PDW BLD-RTO: 16.5 % (ref 12.4–15.4)
PH UA: 6 (ref 5–8)
PH VENOUS: 7.34 (ref 7.35–7.45)
PLATELET # BLD: 11 K/UL (ref 135–450)
PLATELET # BLD: 6 K/UL (ref 135–450)
PLATELET # BLD: 7 K/UL (ref 135–450)
PLATELET SLIDE REVIEW: ABNORMAL
PLATELET SLIDE REVIEW: ABNORMAL
PMV BLD AUTO: 10.2 FL (ref 5–10.5)
PMV BLD AUTO: 9.6 FL (ref 5–10.5)
PMV BLD AUTO: 9.8 FL (ref 5–10.5)
PO2, VEN: 41.4 MMHG (ref 25–40)
POTASSIUM REFLEX MAGNESIUM: 3.6 MMOL/L (ref 3.5–5.1)
PRO-BNP: 7944 PG/ML (ref 0–124)
PROTEIN UA: ABNORMAL MG/DL
PROTHROMBIN TIME: 11.8 SEC (ref 9.9–12.7)
RBC # BLD: 3.31 M/UL (ref 4.2–5.9)
RBC # BLD: 3.64 M/UL (ref 4.2–5.9)
RBC # BLD: 3.68 M/UL (ref 4.2–5.9)
RBC UA: ABNORMAL /HPF (ref 0–4)
RETICULOCYTE ABSOLUTE COUNT: 0.02 M/UL
RETICULOCYTE COUNT PCT: 0.7 % (ref 0.5–2.18)
SARS-COV-2, NAAT: NOT DETECTED
SLIDE REVIEW: ABNORMAL
SODIUM BLD-SCNC: 121 MMOL/L (ref 136–145)
SODIUM URINE: <20 MMOL/L
SPECIFIC GRAVITY UA: 1.02 (ref 1–1.03)
TCO2 CALC VENOUS: 18 MMOL/L
TOTAL PROTEIN: 5.7 G/DL (ref 6.4–8.2)
TROPONIN: <0.01 NG/ML
URINE REFLEX TO CULTURE: ABNORMAL
URINE TYPE: ABNORMAL
UROBILINOGEN, URINE: 0.2 E.U./DL
WBC # BLD: 5.9 K/UL (ref 4–11)
WBC # BLD: 6.6 K/UL (ref 4–11)
WBC # BLD: 6.9 K/UL (ref 4–11)
WBC UA: ABNORMAL /HPF (ref 0–5)

## 2022-03-29 PROCEDURE — 74176 CT ABD & PELVIS W/O CONTRAST: CPT

## 2022-03-29 PROCEDURE — 86901 BLOOD TYPING SEROLOGIC RH(D): CPT

## 2022-03-29 PROCEDURE — 81001 URINALYSIS AUTO W/SCOPE: CPT

## 2022-03-29 PROCEDURE — 99284 EMERGENCY DEPT VISIT MOD MDM: CPT

## 2022-03-29 PROCEDURE — 51798 US URINE CAPACITY MEASURE: CPT

## 2022-03-29 PROCEDURE — 80053 COMPREHEN METABOLIC PANEL: CPT

## 2022-03-29 PROCEDURE — 2500000003 HC RX 250 WO HCPCS: Performed by: INTERNAL MEDICINE

## 2022-03-29 PROCEDURE — 84145 PROCALCITONIN (PCT): CPT

## 2022-03-29 PROCEDURE — 85025 COMPLETE CBC W/AUTO DIFF WBC: CPT

## 2022-03-29 PROCEDURE — 76705 ECHO EXAM OF ABDOMEN: CPT

## 2022-03-29 PROCEDURE — 84300 ASSAY OF URINE SODIUM: CPT

## 2022-03-29 PROCEDURE — 2580000003 HC RX 258: Performed by: STUDENT IN AN ORGANIZED HEALTH CARE EDUCATION/TRAINING PROGRAM

## 2022-03-29 PROCEDURE — 83930 ASSAY OF BLOOD OSMOLALITY: CPT

## 2022-03-29 PROCEDURE — 96360 HYDRATION IV INFUSION INIT: CPT

## 2022-03-29 PROCEDURE — 86850 RBC ANTIBODY SCREEN: CPT

## 2022-03-29 PROCEDURE — 85384 FIBRINOGEN ACTIVITY: CPT

## 2022-03-29 PROCEDURE — 6370000000 HC RX 637 (ALT 250 FOR IP): Performed by: STUDENT IN AN ORGANIZED HEALTH CARE EDUCATION/TRAINING PROGRAM

## 2022-03-29 PROCEDURE — 2060000000 HC ICU INTERMEDIATE R&B

## 2022-03-29 PROCEDURE — 87635 SARS-COV-2 COVID-19 AMP PRB: CPT

## 2022-03-29 PROCEDURE — 83550 IRON BINDING TEST: CPT

## 2022-03-29 PROCEDURE — P9035 PLATELET PHERES LEUKOREDUCED: HCPCS

## 2022-03-29 PROCEDURE — 36430 TRANSFUSION BLD/BLD COMPNT: CPT

## 2022-03-29 PROCEDURE — 87040 BLOOD CULTURE FOR BACTERIA: CPT

## 2022-03-29 PROCEDURE — 84484 ASSAY OF TROPONIN QUANT: CPT

## 2022-03-29 PROCEDURE — 83540 ASSAY OF IRON: CPT

## 2022-03-29 PROCEDURE — 71045 X-RAY EXAM CHEST 1 VIEW: CPT

## 2022-03-29 PROCEDURE — 2580000003 HC RX 258: Performed by: INTERNAL MEDICINE

## 2022-03-29 PROCEDURE — 36415 COLL VENOUS BLD VENIPUNCTURE: CPT

## 2022-03-29 PROCEDURE — 82728 ASSAY OF FERRITIN: CPT

## 2022-03-29 PROCEDURE — 93005 ELECTROCARDIOGRAM TRACING: CPT | Performed by: STUDENT IN AN ORGANIZED HEALTH CARE EDUCATION/TRAINING PROGRAM

## 2022-03-29 PROCEDURE — 83010 ASSAY OF HAPTOGLOBIN QUANT: CPT

## 2022-03-29 PROCEDURE — 83615 LACTATE (LD) (LDH) ENZYME: CPT

## 2022-03-29 PROCEDURE — 86900 BLOOD TYPING SEROLOGIC ABO: CPT

## 2022-03-29 PROCEDURE — 83605 ASSAY OF LACTIC ACID: CPT

## 2022-03-29 PROCEDURE — 85610 PROTHROMBIN TIME: CPT

## 2022-03-29 PROCEDURE — 93308 TTE F-UP OR LMTD: CPT

## 2022-03-29 PROCEDURE — 83690 ASSAY OF LIPASE: CPT

## 2022-03-29 PROCEDURE — 99223 1ST HOSP IP/OBS HIGH 75: CPT | Performed by: INTERNAL MEDICINE

## 2022-03-29 PROCEDURE — 85045 AUTOMATED RETICULOCYTE COUNT: CPT

## 2022-03-29 PROCEDURE — 76770 US EXAM ABDO BACK WALL COMP: CPT

## 2022-03-29 PROCEDURE — 6360000002 HC RX W HCPCS: Performed by: STUDENT IN AN ORGANIZED HEALTH CARE EDUCATION/TRAINING PROGRAM

## 2022-03-29 PROCEDURE — 83935 ASSAY OF URINE OSMOLALITY: CPT

## 2022-03-29 PROCEDURE — 85730 THROMBOPLASTIN TIME PARTIAL: CPT

## 2022-03-29 PROCEDURE — 82570 ASSAY OF URINE CREATININE: CPT

## 2022-03-29 PROCEDURE — 80074 ACUTE HEPATITIS PANEL: CPT

## 2022-03-29 PROCEDURE — 85027 COMPLETE CBC AUTOMATED: CPT

## 2022-03-29 PROCEDURE — 83880 ASSAY OF NATRIURETIC PEPTIDE: CPT

## 2022-03-29 PROCEDURE — 82803 BLOOD GASES ANY COMBINATION: CPT

## 2022-03-29 RX ORDER — ACETAMINOPHEN 325 MG/1
650 TABLET ORAL EVERY 6 HOURS PRN
Status: DISCONTINUED | OUTPATIENT
Start: 2022-03-29 | End: 2022-04-04 | Stop reason: HOSPADM

## 2022-03-29 RX ORDER — ACETAMINOPHEN 650 MG/1
650 SUPPOSITORY RECTAL EVERY 6 HOURS PRN
Status: DISCONTINUED | OUTPATIENT
Start: 2022-03-29 | End: 2022-04-04 | Stop reason: HOSPADM

## 2022-03-29 RX ORDER — SODIUM CHLORIDE 9 MG/ML
INJECTION, SOLUTION INTRAVENOUS PRN
Status: DISCONTINUED | OUTPATIENT
Start: 2022-03-29 | End: 2022-04-04 | Stop reason: HOSPADM

## 2022-03-29 RX ORDER — HEPARIN SODIUM 5000 [USP'U]/ML
5000 INJECTION, SOLUTION INTRAVENOUS; SUBCUTANEOUS EVERY 8 HOURS SCHEDULED
Status: DISCONTINUED | OUTPATIENT
Start: 2022-03-29 | End: 2022-03-29

## 2022-03-29 RX ORDER — ONDANSETRON 2 MG/ML
4 INJECTION INTRAMUSCULAR; INTRAVENOUS EVERY 6 HOURS PRN
Status: DISCONTINUED | OUTPATIENT
Start: 2022-03-29 | End: 2022-04-04 | Stop reason: HOSPADM

## 2022-03-29 RX ORDER — SODIUM CHLORIDE, SODIUM LACTATE, POTASSIUM CHLORIDE, AND CALCIUM CHLORIDE .6; .31; .03; .02 G/100ML; G/100ML; G/100ML; G/100ML
1000 INJECTION, SOLUTION INTRAVENOUS ONCE
Status: COMPLETED | OUTPATIENT
Start: 2022-03-29 | End: 2022-03-29

## 2022-03-29 RX ORDER — ONDANSETRON 4 MG/1
4 TABLET, ORALLY DISINTEGRATING ORAL EVERY 8 HOURS PRN
Status: DISCONTINUED | OUTPATIENT
Start: 2022-03-29 | End: 2022-04-04 | Stop reason: HOSPADM

## 2022-03-29 RX ORDER — SODIUM CHLORIDE 0.9 % (FLUSH) 0.9 %
5-40 SYRINGE (ML) INJECTION EVERY 12 HOURS SCHEDULED
Status: DISCONTINUED | OUTPATIENT
Start: 2022-03-29 | End: 2022-04-04 | Stop reason: HOSPADM

## 2022-03-29 RX ORDER — SODIUM CHLORIDE 0.9 % (FLUSH) 0.9 %
5-40 SYRINGE (ML) INJECTION PRN
Status: DISCONTINUED | OUTPATIENT
Start: 2022-03-29 | End: 2022-04-04 | Stop reason: HOSPADM

## 2022-03-29 RX ORDER — SODIUM CHLORIDE 9 MG/ML
INJECTION, SOLUTION INTRAVENOUS CONTINUOUS
Status: DISCONTINUED | OUTPATIENT
Start: 2022-03-29 | End: 2022-03-29

## 2022-03-29 RX ORDER — LIDOCAINE 4 G/G
1 PATCH TOPICAL DAILY
Status: DISCONTINUED | OUTPATIENT
Start: 2022-03-29 | End: 2022-04-04 | Stop reason: HOSPADM

## 2022-03-29 RX ORDER — POLYETHYLENE GLYCOL 3350 17 G/17G
17 POWDER, FOR SOLUTION ORAL DAILY PRN
Status: DISCONTINUED | OUTPATIENT
Start: 2022-03-29 | End: 2022-03-31

## 2022-03-29 RX ORDER — TAMSULOSIN HYDROCHLORIDE 0.4 MG/1
0.4 CAPSULE ORAL DAILY
Status: DISCONTINUED | OUTPATIENT
Start: 2022-03-29 | End: 2022-04-04 | Stop reason: HOSPADM

## 2022-03-29 RX ORDER — ACETAMINOPHEN 500 MG
500 TABLET ORAL EVERY 6 HOURS PRN
COMMUNITY

## 2022-03-29 RX ORDER — CHLORPROMAZINE HYDROCHLORIDE 25 MG/1
25 TABLET, FILM COATED ORAL ONCE
Status: DISCONTINUED | OUTPATIENT
Start: 2022-03-30 | End: 2022-03-30

## 2022-03-29 RX ADMIN — SODIUM CHLORIDE, SODIUM LACTATE, POTASSIUM CHLORIDE, AND CALCIUM CHLORIDE 1000 ML: .6; .31; .03; .02 INJECTION, SOLUTION INTRAVENOUS at 11:14

## 2022-03-29 RX ADMIN — TAMSULOSIN HYDROCHLORIDE 0.4 MG: 0.4 CAPSULE ORAL at 16:47

## 2022-03-29 RX ADMIN — SODIUM BICARBONATE: 84 INJECTION, SOLUTION INTRAVENOUS at 19:31

## 2022-03-29 RX ADMIN — SODIUM CHLORIDE, PRESERVATIVE FREE 10 ML: 5 INJECTION INTRAVENOUS at 20:49

## 2022-03-29 RX ADMIN — SODIUM CHLORIDE, POTASSIUM CHLORIDE, SODIUM LACTATE AND CALCIUM CHLORIDE 1000 ML: 600; 310; 30; 20 INJECTION, SOLUTION INTRAVENOUS at 12:52

## 2022-03-29 RX ADMIN — CEFTRIAXONE 1000 MG: 1 INJECTION, POWDER, FOR SOLUTION INTRAMUSCULAR; INTRAVENOUS at 15:49

## 2022-03-29 RX ADMIN — SODIUM CHLORIDE: 9 INJECTION, SOLUTION INTRAVENOUS at 15:52

## 2022-03-29 ASSESSMENT — ENCOUNTER SYMPTOMS
DIARRHEA: 1
EYES NEGATIVE: 1
CHEST TIGHTNESS: 0
APNEA: 0
ABDOMINAL DISTENTION: 1
ALLERGIC/IMMUNOLOGIC NEGATIVE: 1
WHEEZING: 0
ABDOMINAL DISTENTION: 0
CONSTIPATION: 0
BACK PAIN: 1
SHORTNESS OF BREATH: 0
ABDOMINAL PAIN: 0
BACK PAIN: 0
TROUBLE SWALLOWING: 0
VOMITING: 1
COUGH: 0
RESPIRATORY NEGATIVE: 1
NAUSEA: 1
SORE THROAT: 0
STRIDOR: 0
RHINORRHEA: 0
BLOOD IN STOOL: 0
SINUS PRESSURE: 0

## 2022-03-29 NOTE — PROGRESS NOTES
Clinical Pharmacy Progress Note  Medication History     Admit Date: 3/29/2022    List of of current medications patient is taking is complete. Home Medication list in EPIC updated to reflect changes noted below. Source of information: Interview with Patient and Family    Patient's home pharmacy: [id number] ([phone])     Patients medication list is now complete, see below. Mariam Gutierrez, PharmD  PGY-1 Pharmacy Resident  [id number]  3/29/2022 1:10 PM    No current facility-administered medications on file prior to encounter.      Current Outpatient Medications on File Prior to Encounter   Medication Sig Dispense Refill    acetaminophen (TYLENOL) 500 MG tablet Take 500 mg by mouth every 6 hours as needed for Pain      tamsulosin (FLOMAX) 0.4 MG capsule TAKE 1 CAPSULE BY MOUTH EVERY DAY 30 capsule 0    lisinopril-hydroCHLOROthiazide (PRINZIDE;ZESTORETIC) 20-12.5 MG per tablet Take 1 tablet by mouth daily 90 tablet 1

## 2022-03-29 NOTE — ED PROVIDER NOTES
ED Attending Attestation Note     Date of evaluation: 3/29/2022    This patient was seen by the resident. I have seen and examined the patient, agree with the workup, evaluation, management and diagnosis. The care plan has been discussed. My assessment reveals patient with prostate cancer brought from radiation oncology where he was hypotensive. Patient has not bee eating or drinking much over the last   several days and has declined during that time. Found to be in ARF and will need admission.      Jg Severino MD  03/29/22 0345

## 2022-03-29 NOTE — H&P
Internal Medicine  PGY 1  History & Physical      CC: Hypotension, diarrhea, fatigue    History Obtained From:  patient, electronic medical record    HISTORY OF PRESENT ILLNESS:  Ester Clay is a 66year old Male with a PmHx of prostate cancer with metastasis to the pelvis, asthma, lipoma resection, urinary retention, hernia repair who present to the ED with a chief complaint of hypotension, diarrhea, fatigue. Currently undergoing radiation therapy, but no chemotherapy for prostate cancer and at his last appointment a few days ago he was hypotensive to 70's/40's which improved after a 1L bolus. Last weekend he endorsed non-bloody diarrhea, intermittent cramping abdominal pain, generalized weakness, and N/V which resolved by Sunday. Family endorses increased weakness and decreased PO intake due to general malaise and N/V. He has increasing weakness for the last few weeks but was still able to walk and drive before the weekend, but has worsened since. He just started radiation therapy on 3/21 and has received one dose of hormonal therapy. Not on chemo and no plans for surgery. Does endorse some burning and discomfort upon urination. He was able a weak ago to drive himself home and now he is substantially more weak. He denies fever, chills, chest pain, shortness of breath, dysuria, hematuria, melena, hematochezia, numbness, unilateral weakness, lower extremity weakness, bowel or bladder incontinence. In the ED, he is hyponatremic to 121, creatinine 4.9, anion gap of 21, LA 1.2, BNP elevated to 7944 (last echo 11/2021 w/ EF 50% and indeterminate diastolic), , platelets 11, VBG 4.47/98.0/56. No acute changes to chest XR.     Past Medical History:        Diagnosis Date    Asthma     Environmental allergies     Inguinal hernia     Prostate cancer [facility])    ·     Past Surgical History:        Procedure Laterality Date    INGUINAL HERNIA REPAIR Bilateral 6/20/2019    LAPAROSCOPIC BILATERAL INGUINAL HERNIA REPAIR WITH MESH performed by Junie Bence, MD at [facility]      back    ·     Medications Priorto Admission:    · Not in a hospital admission. Allergies: Other    Social History:   · TOBACCO:   reports that he has never smoked. He has never used smokeless tobacco.  · ETOH:   reports current alcohol use. Not use recently, intermittently when younger  · DRUGS : none  · Patient currently lives at home  ·   Family History:   · History reviewed. No pertinent family history. Review of Systems   Constitutional: Positive for activity change, appetite change and fatigue. Negative for chills and fever. HENT: Negative for sinus pressure, sneezing and trouble swallowing. Eyes: Negative for visual disturbance. Respiratory: Negative for apnea, cough, chest tightness, shortness of breath, wheezing and stridor. Cardiovascular: Negative for chest pain, palpitations and leg swelling. Gastrointestinal: Positive for abdominal distention, diarrhea, nausea and vomiting. Negative for abdominal pain, blood in stool and constipation. Endocrine: Negative for polyuria. Genitourinary: Positive for difficulty urinating. Negative for flank pain. Musculoskeletal: Positive for back pain. Negative for arthralgias, myalgias and neck stiffness. Skin: Negative for rash and wound. Allergic/Immunologic: Negative for immunocompromised state. Neurological: Positive for weakness and light-headedness. Negative for tremors, seizures, syncope, speech difficulty and headaches. Hematological: Negative for adenopathy. Psychiatric/Behavioral: Negative for agitation, behavioral problems, confusion and decreased concentration. Physical Exam  Constitutional:       General: He is not in acute distress. Appearance: He is ill-appearing. HENT:      Head: Normocephalic.       Right Ear: External ear normal.      Left Ear: External ear normal.      Nose: Nose normal.      Mouth/Throat:      Mouth: Mucous membranes are dry. Pharynx: Oropharynx is clear. No oropharyngeal exudate. Eyes:      Extraocular Movements: Extraocular movements intact. Conjunctiva/sclera: Conjunctivae normal.      Pupils: Pupils are equal, round, and reactive to light. Cardiovascular:      Rate and Rhythm: Normal rate and regular rhythm. Pulses: Normal pulses. Heart sounds: No friction rub. No gallop. Pulmonary:      Effort: Pulmonary effort is normal. No respiratory distress. Breath sounds: No stridor. No wheezing, rhonchi or rales. Abdominal:      General: There is distension. Palpations: Abdomen is soft. Tenderness: There is no abdominal tenderness. There is no right CVA tenderness, left CVA tenderness, guarding or rebound. Musculoskeletal:         General: No deformity. Right lower leg: No edema. Left lower leg: No edema. Skin:     General: Skin is warm. Coloration: Skin is not jaundiced. Findings: No bruising, erythema or lesion. Neurological:      Mental Status: He is alert and oriented to person, place, and time. Cranial Nerves: No cranial nerve deficit. Motor: Weakness present. Comments: Weakness most prominent in the b/l LE 3/5. Full strength in b/l UE. Difficulty leaning forward. Vitals:    03/29/22 1245   BP:    Pulse: 80   Resp: 17   Temp:    SpO2: 99%       DATA:    Labs:  CBC:   Recent Labs     03/29/22  1135   WBC 6.9   HGB 10.5*   HCT 31.1*   PLT 11*       BMP:   Recent Labs     03/29/22  1135   *   K 3.6   CL 85*   CO2 15*   *   CREATININE 4.9*   GLUCOSE 145*     LFT's:   Recent Labs     03/29/22  1135   *   ALT 78*   BILITOT 1.3*   ALKPHOS 226*     Troponin:   Recent Labs     03/29/22  1135   TROPONINI <0.01     BNP:No results for input(s): BNP in the last 72 hours. ABGs: No results for input(s): PHART, YPE2GDG, PO2ART in the last 72 hours.   INR:   Recent Labs     03/29/22  1135   INR 1.04       U/A:No results for input(s): NITRITE, COLORU, PHUR, LABCAST, WBCUA, RBCUA, MUCUS, TRICHOMONAS, YEAST, BACTERIA, CLARITYU, SPECGRAV, LEUKOCYTESUR, UROBILINOGEN, BILIRUBINUR, BLOODU, GLUCOSEU, AMORPHOUS in the last 72 hours. Invalid input(s): KETONESU    XR CHEST PORTABLE   Final Result   1. No interval changes. ASSESSMENT AND PLAN:  Venancio Fan is a 66year old Male with a PmHx of prostate cancer with metastasis to the pelvis, asthma, lipoma resection, hernia repair who present to the ED with a chief complaint of hypotension, diarrhea, fatigue. Hypovolemic hyponatremia likely 2/2 viral gastroenteritis and poor PO intake  Hypotension  Patient is acutely dehydrated and hypotensive, responding to IVF. Diarrhea and N/V over the weekend has since resolved and patient endorse poor PO intake since then. - Nephrology consulted, appreciate recs  - Blood culture x2 and urine culture to rule out sepsis  -  ml/hr  - Urine studies  - Dietician consulted due to poor PO intake  - Follow BMP and mag  - PT/OT    AQUILINO  Hx of Urinary retention  Likely 2/2 poor PO intake, N/V, and diarrhea. However he has a hx of urinary retention so it might also be due to obstruction. 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 Likely acute renal failure causing elevated BMP. - Urology consult, appreciate recs  - CT abdomen without countrast  - Bladder scan qShift  - Leblanc catheter if needed  - Urinalysis with reflex  - Urine culture  - Urine studies  - Home flomax    Suspected UTI  Urinalysis showed 2+ WBC with burining upon urination and hx of retention. Did not meets sepsis criteria  - Started ceftriaxone (3/29 - )  - procal  - Urine culture    Prostate cancer  Started radiation therapy on 3/21. S/p 1 round of hormonal therapy.  Not undergoing chemotherapy or plans for surgery  - Consulted urology, appreciate recs  - Consulted oncology, Dr. Tiffanie Issa, appreciate recs  - Monitor BMP  - Monitor for signs of retention     Thrombocytopenia 2/2 radiation therapy  Platelets 41Y today, baseline less than a month ago >400k s/p radiation therapy. Need to rule out TTP/HUS. Concern for sepsis as well. - Hematology and oncology consulted, appreciate recs  - CBC daily  - Transfuse <10  - Path review, smear  - LDH, haptoglobin, reticulocyte count  - PT/INR  - PTT and fibrinogen  - Holding anticoag    Anion gap metabolic acidosis  No clear cause with initial LA normal. Respiratory compensation.  - Repeat lactic acid  - Denies alcohol use    Transaminitis  Recent diarrhea and N/V with elevated levels could warrant a hepatitis work-up.   - Abdominal US  - Trend liver panels    Chronic back pain  - Fentanyl patch     Will discuss with attending physician Dr. Ceferino Huynh Status: Full code  FEN: Regular diet, pending swallow eval  PPX:SCD  DISPO: Lora Nogueira MD  3/29/2022,  1:38 PM

## 2022-03-29 NOTE — ED PROVIDER NOTES
[address] ENCOUNTER          EM RESIDENT NOTE       Date of evaluation: 3/29/2022    Chief Complaint     Extremity Weakness, Fatigue, Groin Pain, Emesis, and Diarrhea      History of Present Illness     Natalie Camejo is a 66 y.o. male with prostate cancer with mets to pelvis who presents to the emergency department from heme-onc clinic with a chief complaint of hypotension, diarrhea, and fatigue. Family states that patient was diagnosed with prostate cancer and follows with urology. He has been receiving radiation, no chemo, but yesterday when he presented for his appointment, he was reportedly hypotensive to 76s over 45s. This improved with 1 L of LR, the patient went home. The patient tells me that since Thursday or Friday, he has had nonbloody diarrhea, intermittent cramping abdominal pain, generalized weakness, and nausea and emesis, nonbloody nonbilious, most recently Saturday. He denies fever, chills, chest pain, shortness of breath, dysuria, hematuria, melena, hematochezia, numbness, unilateral weakness, lower extremity weakness, bowel or bladder incontinence. His brother and sister at bedside are very concerned with his level of weakness and reported that he has had incredibly decreased p.o. intake due to generalized malaise, anorexia, nausea, and vomiting. They report he follows with urology but does not yet have a medical oncologist.    Review of Systems     Review of Systems   Constitutional: Negative. Negative for chills, diaphoresis and fever. HENT: Negative. Negative for rhinorrhea, sneezing and sore throat. Eyes: Negative. Respiratory: Negative. Negative for cough, shortness of breath and wheezing. Cardiovascular: Negative. Negative for chest pain, palpitations and leg swelling. Gastrointestinal: Positive for diarrhea, nausea and vomiting. Negative for abdominal distention, abdominal pain and blood in stool. Endocrine: Negative. Genitourinary: Positive for difficulty urinating. Negative for decreased urine volume, dysuria, flank pain, frequency, hematuria and penile discharge. Musculoskeletal: Negative. Negative for back pain and neck pain. Skin: Negative. Negative for rash. Allergic/Immunologic: Negative. Neurological: Positive for weakness. Negative for dizziness, seizures, syncope, facial asymmetry, speech difficulty, light-headedness, numbness and headaches. Hematological: Negative. Psychiatric/Behavioral: Negative. Past Medical, Surgical, Family, and Social History     He has a past medical history of Asthma, Environmental allergies, Inguinal hernia, and Prostate cancer ([facility] [address] He has a past surgical history that includes lipoma resection and Inguinal hernia repair (Bilateral, 6/20/2019). His family history is not on file. He reports that he has never smoked. He has never used smokeless tobacco. He reports current alcohol use. He reports that he does not use drugs. Medications     Previous Medications    ACETAMINOPHEN (TYLENOL) 500 MG TABLET    Take 500 mg by mouth every 6 hours as needed for Pain    LISINOPRIL-HYDROCHLOROTHIAZIDE (PRINZIDE;ZESTORETIC) 20-12.5 MG PER TABLET    Take 1 tablet by mouth daily    TAMSULOSIN (FLOMAX) 0.4 MG CAPSULE    TAKE 1 CAPSULE BY MOUTH EVERY DAY       Allergies     He is allergic to other. Physical Exam     INITIAL VITALS: BP: (!) 80/53, Temp: 98 °F (36.7 °C), Pulse: 82, Resp: 18, SpO2: 99 %   Physical Exam  Vitals reviewed. Constitutional:       General: He is not in acute distress. Appearance: He is not toxic-appearing or diaphoretic. Comments: Chronically ill-appearing, appears fatigued but in no acute distress. HENT:      Head: Normocephalic and atraumatic. Right Ear: External ear normal.      Left Ear: External ear normal.      Nose: Nose normal.      Mouth/Throat:      Mouth: Mucous membranes are dry. Pharynx: Oropharynx is clear.  No oropharyngeal exudate. Eyes:      Extraocular Movements: Extraocular movements intact. Conjunctiva/sclera: Conjunctivae normal.   Cardiovascular:      Rate and Rhythm: Normal rate and regular rhythm. Pulses: Normal pulses. Heart sounds: Murmur (Systolic murmur heard best over the apex) heard. Pulmonary:      Effort: Pulmonary effort is normal. No respiratory distress. Breath sounds: Normal breath sounds. No wheezing. Abdominal:      General: Abdomen is flat. There is no distension. Palpations: Abdomen is soft. Tenderness: There is no abdominal tenderness. There is no left CVA tenderness. Musculoskeletal:         General: No swelling, deformity or signs of injury. Normal range of motion. Cervical back: Normal range of motion and neck supple. No tenderness. Right lower leg: No edema. Left lower leg: No edema. Skin:     General: Skin is warm and dry. Capillary Refill: Capillary refill takes less than 2 seconds. Coloration: Skin is pale. Findings: No bruising, erythema or rash. Neurological:      General: No focal deficit present. Mental Status: He is alert and oriented to person, place, and time. Cranial Nerves: No cranial nerve deficit. Sensory: No sensory deficit. Motor: Weakness (Generalized but on exam is 5/5 in bilateral upper and lower) present.       Coordination: Coordination normal.   Psychiatric:         Mood and Affect: Mood normal.         Behavior: Behavior normal.         DiagnosticResults     EKG   Interpreted in conjunction with emergencydepartment physician Navi Khalil MD  Rhythm: normal sinus   Rate: normal  Axis: normal  Ectopy: none  Conduction: normal  ST Segments: no acute change  T Waves:no acute change  Q Waves: I and aVl  Clinical Impression: no acute changes  Comparison: No significant change from prior on 1/25 2022    RADIOLOGY:  US ABDOMEN LIMITED   Final Result   IMPRESSION : Gallbladder polyps, the largest of which measures 6 x 5 mm. One year follow up ultrasound recommended. Left hydronephrosis, as noted on the prior study. Mildly heterogeneous increased hepatic echogenicity. Findings may reflect mild steatosis. US RENAL COMPLETE   Final Result   IMPRESSION :      Gallbladder polyps, the largest of which measures 6 x 5 mm. One year follow up ultrasound recommended. Left hydronephrosis, as noted on the prior study. Mildly heterogeneous increased hepatic echogenicity. Findings may reflect mild steatosis. CT ABDOMEN PELVIS WO CONTRAST Additional Contrast? None   Final Result      1. Moderate left hydronephrosis with an obstructing 3 mm calculus at the left UVJ. 2.  Trace left pleural effusion with associated mild airspace consolidation which could represent atelectasis and/or pneumonia. POC US [facility]   Final Result      XR CHEST PORTABLE   Final Result   1. No interval changes.           LABS:   Results for orders placed or performed during the hospital encounter of 03/29/22   COVID-19, Rapid    Specimen: Nasopharyngeal Swab   Result Value Ref Range    SARS-CoV-2, NAAT Not Detected Not Detected   CBC with Auto Differential   Result Value Ref Range    WBC 6.9 4.0 - 11.0 K/uL    RBC 3.64 (L) 4.20 - 5.90 M/uL    Hemoglobin 10.5 (L) 13.5 - 17.5 g/dL    Hematocrit 31.1 (L) 40.5 - 52.5 %    MCV 85.6 80.0 - 100.0 fL    MCH 28.9 26.0 - 34.0 pg    MCHC 33.8 31.0 - 36.0 g/dL    RDW 16.1 (H) 12.4 - 15.4 %    Platelets 11 (LL) 429 - 450 K/uL    MPV 9.6 5.0 - 10.5 fL    PLATELET SLIDE REVIEW Decreased     Neutrophils % 87.0 %    Lymphocytes % 1.0 %    Monocytes % 3.0 %    Eosinophils % 1.0 %    Basophils % 0.0 %    Neutrophils Absolute 6.6 1.7 - 7.7 K/uL    Lymphocytes Absolute 0.1 (L) 1.0 - 5.1 K/uL    Monocytes Absolute 0.2 0.0 - 1.3 K/uL    Eosinophils Absolute 0.1 0.0 - 0.6 K/uL    Basophils Absolute 0.0 0.0 - 0.2 K/uL    Bands Relative 8 (H) 0 - 7 %    Anisocytosis Occasional (A)    Comprehensive Metabolic Panel w/ Reflex to MG   Result Value Ref Range    Sodium 121 (L) 136 - 145 mmol/L    Potassium reflex Magnesium 3.6 3.5 - 5.1 mmol/L    Chloride 85 (L) 99 - 110 mmol/L    CO2 15 (L) 21 - 32 mmol/L    Anion Gap 21 (H) 3 - 16    Glucose 145 (H) 70 - 99 mg/dL     (HH) 7 - 20 mg/dL    CREATININE 4.9 (H) 0.8 - 1.3 mg/dL    GFR Non- 12 (A) >60    GFR  14 (A) >60    Calcium 8.1 (L) 8.3 - 10.6 mg/dL    Total Protein 5.7 (L) 6.4 - 8.2 g/dL    Albumin 2.5 (L) 3.4 - 5.0 g/dL    Albumin/Globulin Ratio 0.8 (L) 1.1 - 2.2    Total Bilirubin 1.3 (H) 0.0 - 1.0 mg/dL    Alkaline Phosphatase 226 (H) 40 - 129 U/L    ALT 78 (H) 10 - 40 U/L     (H) 15 - 37 U/L   Lipase   Result Value Ref Range    Lipase 28.0 13.0 - 60.0 U/L   Troponin   Result Value Ref Range    Troponin <0.01 <0.01 ng/mL   Brain Natriuretic Peptide   Result Value Ref Range    Pro-BNP 7,944 (H) 0 - 124 pg/mL   Protime-INR   Result Value Ref Range    Protime 11.8 9.9 - 12.7 sec    INR 1.04 0.88 - 1.12   Lactic Acid   Result Value Ref Range    Lactic Acid 1.2 0.4 - 2.0 mmol/L   Blood Gas, Venous   Result Value Ref Range    pH, Reginaldo 7.342 (L) 7.350 - 7.450    pCO2, Reginaldo 30.9 (L) 41.0 - 51.0 mmHg    pO2, Reginaldo 41.4 (H) 25.0 - 40.0 mmHg    HCO3, Venous 16.7 (L) 24.0 - 28.0 mmol/L    Base Excess, Reginaldo -8.0 (L) -2.0 - 3.0 mmol/L    O2 Sat, Reginaldo 74 Not established %    Carboxyhemoglobin 1.1 0.0 - 1.5 %    MetHgb, Reginaldo 0.1 0.0 - 1.5 %    TC02 (Calc), Reginaldo 18 mmol/L    Hemoglobin, Reginaldo, Reduced 26.00 %   Urinalysis with Reflex to Culture    Specimen: Urine   Result Value Ref Range    Color, UA Yellow Straw/Yellow    Clarity, UA Clear Clear    Glucose, Ur Negative Negative mg/dL    Bilirubin Urine SMALL (A) Negative    Ketones, Urine TRACE (A) Negative mg/dL    Specific Gravity, UA 1.020 1.005 - 1.030    Blood, Urine MODERATE (A) Negative    pH, UA 6.0 5.0 - 8.0 Linked Order Group     acetaminophen (TYLENOL) tablet 650 mg     acetaminophen (TYLENOL) suppository 650 mg    DISCONTD: 0.9 % sodium chloride infusion    lidocaine 4 % external patch 1 patch    cefTRIAXone (ROCEPHIN) 1000 mg IVPB in 50 mL D5W minibag     Order Specific Question:   Antimicrobial Indications     Answer:   Urinary Tract Infection    sodium bicarbonate 100 mEq in sodium chloride 0.45 % 1,000 mL infusion       CONSULTS:  IP CONSULT TO HOSPITALIST  IP CONSULT TO HEMATOLOGY  IP CONSULT TO UROLOGY  IP CONSULT TO ONCOLOGY  IP CONSULT TO NEPHROLOGY  IP CONSULT TO CASE MANAGEMENT  IP CONSULT TO . [address] / ASSESSMENT / Andrae Harjeet is a 66 y.o. male with history of prostate cancer who presents to the emergency department with diarrhea, poor p.o. intake, hypotension and generalized malaise. On initial evaluation, patient is hypotensive with a map of 65 but not tachycardic and mentating with good distal perfusion. Patient given total of 2 L bolus of LR. CBC shows a normocytic anemia with a hemoglobin of 10.5, no leukocytosis but a notable thrombocytopenia that is new with a platelet count of 89,041. CBC shows significant hyponatremia to 121 and anion gap metabolic acidosis, and AQUILINO with a creatinine of 4.9, elevation of alk phos of 226, , ALT 78. His VBG shows an acidosis of 7.34 with a PCO2 of 31. His lactate is 1.2. He appears volume down on exam, though his BNP is 7944. His lipase is 28. Troponin negative. EKG normal sinus rhythm without findings of ischemia. Bedside echo shows no pericardial effusion. Discussed with patient and family and admitted patient to hospitalist for further work-up and evaluation. This patient was also evaluated by the attending physician. All care plans werediscussed and agreed upon. Clinical Impression     1. Hypovolemia    2. AQUILINO (acute kidney injury) ([address]    3. Thrombocytopenia ([address]    4.  Increased anion gap metabolic acidosis        Disposition     PATIENT REFERRED TO:  No follow-up provider specified.     DISCHARGE MEDICATIONS:  New Prescriptions    No medications on file       DISPOSITION Admitted 03/29/2022 02:26:58 Dee Conrad MD  Resident  03/29/22 [id number]

## 2022-03-29 NOTE — CONSULTS
Nephrology Consult Note                                                                                                                                                                                                                                                                                                                                                               Office : [phone]     Fax :[phone]              Patient's Name: Pearl Farmer  10:57 PM  3/29/2022    Reason for Consult: AQUILINO   Requesting Physician:  Anai Gutierrez MD      Chief Complaint:  AQUILINO     History of Present Ilness:    Pearl Farmer is a 66 y.o. male with prostate cancer with metastasis to the pelvis, asthma, lipoma resection, urinary retention, hernia repair who present to the ED with a chief complaint of hypotension, diarrhea, fatigue. Currently undergoing radiation therapy, but no chemotherapy for prostate cancer and at his last appointment a few days ago he was hypotensive to 70's/40's which improved after a 1L bolus. Last weekend he endorsed non-bloody diarrhea, intermittent cramping abdominal pain, generalized weakness, and N/V which resolved by Sunday. Family endorses increased weakness and decreased PO intake due to general malaise and N/V. He has increasing weakness for the last few weeks but was still able to walk and drive before the weekend, but has worsened since. He just started radiation therapy on 3/21 and has received one dose of hormonal therapy. Not on chemo and no plans for surgery. Patient complains of left back pain that radiates towards the groin.  He denies fever, chills, chest pain, shortness of breath, dysuria, hematuria, melena, hematochezia, numbness, unilateral weakness, lower extremity weakness, bowel or bladder incontinence.     ED course:  Pt was hyponatremic to 121, creatinine 4.9, anion gap of 21, LA 1.2, BNP elevated to 7944 (last echo 11/2021 w/ EF 50% and indeterminate diastolic), , platelets 11, VBG 4.94/31.6/17. No acute changes to chest XR. Past Medical History:   Diagnosis Date    Asthma     Environmental allergies     Inguinal hernia     Prostate cancer [facility])        Past Surgical History:   Procedure Laterality Date    INGUINAL HERNIA REPAIR Bilateral 6/20/2019    LAPAROSCOPIC BILATERAL INGUINAL HERNIA REPAIR WITH MESH performed by Ankit Moore MD at [address] [facility]      back        History reviewed. No pertinent family history. reports that he has never smoked. He has never used smokeless tobacco. He reports current alcohol use. He reports that he does not use drugs. Allergies:   Other    Current Medications:    tamsulosin (FLOMAX) capsule 0.4 mg, Daily  sodium chloride flush 0.9 % injection 5-40 mL, 2 times per day  sodium chloride flush 0.9 % injection 5-40 mL, PRN  0.9 % sodium chloride infusion, PRN  ondansetron (ZOFRAN-ODT) disintegrating tablet 4 mg, Q8H PRN   Or  ondansetron (ZOFRAN) injection 4 mg, Q6H PRN  polyethylene glycol (GLYCOLAX) packet 17 g, Daily PRN  acetaminophen (TYLENOL) tablet 650 mg, Q6H PRN   Or  acetaminophen (TYLENOL) suppository 650 mg, Q6H PRN  lidocaine 4 % external patch 1 patch, Daily  cefTRIAXone (ROCEPHIN) 1000 mg IVPB in 50 mL D5W minibag, Q24H  sodium bicarbonate 100 mEq in sodium chloride 0.45 % 1,000 mL infusion, Continuous  0.9 % sodium chloride infusion, PRN        Review of Systems:   14 point ROS obtained but were negative except mentioned in HPI      Physical exam:     Vitals:  BP (!) 117/58   Pulse 88   Temp 97.9 °F (36.6 °C) (Oral)   Resp 21   Ht 5' 6\" (1.676 m)   Wt 189 lb 4 oz (85.8 kg)   SpO2 97%   BMI 30.55 kg/m²   Constitutional:  OAA X3 NAD  Skin: no rash, turgor wnl  Heent:  eomi, mmm  Neck: no bruits or jvd noted  Cardiovascular:  S1, S2 without m/r/g  Respiratory: CTA B without w/r/r  Abdomen:  +bs, soft, nt, nd  Ext: + lower extremity edema  Psychiatric: mood and affect appropriate  Musculoskeletal:  Rom, muscular strength intact    Data:   Labs:  CBC:   Recent Labs     03/29/22  1135 03/29/22  1949/22 2058   WBC 6.9 6.6 5.9   HGB 10.5* 9.7* 10.6*   PLT 11* 6* 7*     BMP:    Recent Labs     03/29/22  1135   *   K 3.6   CL 85*   CO2 15*   *   CREATININE 4.9*   GLUCOSE 145*     Ca/Mg/Phos:   Recent Labs     03/29/22  1135   CALCIUM 8.1*     Hepatic:   Recent Labs     03/29/22  1135   *   ALT 78*   BILITOT 1.3*   ALKPHOS 226*     Troponin:   Recent Labs     03/29/22  1135   TROPONINI <0.01     BNP: No results for input(s): BNP in the last 72 hours. Lipids: No results for input(s): CHOL, TRIG, HDL, LDLCALC, LABVLDL in the last 72 hours. ABGs: No results for input(s): PHART, PO2ART, HOL5JAT in the last 72 hours. INR:   Recent Labs     03/29/22  1135   INR 1.04     UA:  Recent Labs     03/29/22  1453   COLORU Yellow   CLARITYU Clear   GLUCOSEU Negative   BILIRUBINUR SMALL*   KETUA TRACE*   SPECGRAV 1.020   BLOODU MODERATE*   PHUR 6.0   PROTEINU TRACE*   UROBILINOGEN 0.2   NITRU Negative   LEUKOCYTESUR SMALL*   LABMICR YES   Debra Mano NotGiven      Urine Microscopic:   Recent Labs     03/29/22  1453   BACTERIA 2+*   WBCUA 3-5   RBCUA 0-2   EPIU 0-1     Urine Culture: No results for input(s): LABURIN in the last 72 hours. Urine Chemistry:   Recent Labs     03/29/22  1453   NAUR <20             IMAGING:  US ABDOMEN LIMITED   Final Result   IMPRESSION :      Gallbladder polyps, the largest of which measures 6 x 5 mm. One year follow up ultrasound recommended. Left hydronephrosis, as noted on the prior study. Mildly heterogeneous increased hepatic echogenicity. Findings may reflect mild steatosis. US RENAL COMPLETE   Final Result   IMPRESSION :      Gallbladder polyps, the largest of which measures 6 x 5 mm. One year follow up ultrasound recommended. Left hydronephrosis, as noted on the prior study.       Mildly heterogeneous increased hepatic echogenicity. Findings may reflect mild steatosis. CT ABDOMEN PELVIS WO CONTRAST Additional Contrast? None   Final Result      1. Moderate left hydronephrosis with an obstructing 3 mm calculus at the left UVJ. 2.  Trace left pleural effusion with associated mild airspace consolidation which could represent atelectasis and/or pneumonia. POC US [facility]   Final Result      XR CHEST PORTABLE   Final Result   1. No interval changes. Assessment/Plan   1. AQUILINO     2. HTN    3. Anemia    4. Acid- base/ Electrolyte imbalance     5.  UTI     Plan   - pt has hydro - Urology to see   - IVF   - ur studies  - labs in am     Recommend to dose adjust all medications  based on renal functions  Maintain SBP> 90 mmHg   Daily weights   AVOID NSAIDs  Avoid Nephrotoxins  Monitor Intake/Output  Call if significant decrease in urine output                   Thank you for allowing us to participate in care of Yosi Grullon MD  Feel free to contact me   [facility] [address]  Office : [phone]  Fax :[phone]

## 2022-03-30 ENCOUNTER — APPOINTMENT (OUTPATIENT)
Dept: GENERAL RADIOLOGY | Age: 72
DRG: 682 | End: 2022-03-30
Payer: MEDICARE

## 2022-03-30 PROBLEM — C61 PROSTATE CANCER (HCC): Status: ACTIVE | Noted: 2022-01-03

## 2022-03-30 LAB
ACANTHOCYTES: ABNORMAL
ACANTHOCYTES: ABNORMAL
ALBUMIN SERPL-MCNC: 2.3 G/DL (ref 3.4–5)
ALP BLD-CCNC: 223 U/L (ref 40–129)
ALT SERPL-CCNC: 58 U/L (ref 10–40)
ANION GAP SERPL CALCULATED.3IONS-SCNC: 16 MMOL/L (ref 3–16)
ANISOCYTOSIS: ABNORMAL
ANISOCYTOSIS: ABNORMAL
AST SERPL-CCNC: 96 U/L (ref 15–37)
BANDED NEUTROPHILS RELATIVE PERCENT: 3 % (ref 0–7)
BASOPHILS ABSOLUTE: 0 K/UL (ref 0–0.2)
BASOPHILS RELATIVE PERCENT: 0 %
BASOPHILS RELATIVE PERCENT: 0 %
BASOPHILS RELATIVE PERCENT: 0.3 %
BILIRUB SERPL-MCNC: 1.7 MG/DL (ref 0–1)
BILIRUBIN DIRECT: 1.2 MG/DL (ref 0–0.3)
BILIRUBIN, INDIRECT: 0.5 MG/DL (ref 0–1)
BUN BLDV-MCNC: 124 MG/DL (ref 7–20)
BURR CELLS: ABNORMAL
CALCIUM SERPL-MCNC: 8 MG/DL (ref 8.3–10.6)
CHLORIDE BLD-SCNC: 92 MMOL/L (ref 99–110)
CO2: 19 MMOL/L (ref 21–32)
CREAT SERPL-MCNC: 3.7 MG/DL (ref 0.8–1.3)
DOHLE BODIES: PRESENT
EOSINOPHILS ABSOLUTE: 0 K/UL (ref 0–0.6)
EOSINOPHILS RELATIVE PERCENT: 0 %
EOSINOPHILS RELATIVE PERCENT: 0 %
EOSINOPHILS RELATIVE PERCENT: 0.6 %
GFR AFRICAN AMERICAN: 20
GFR NON-AFRICAN AMERICAN: 16
GLUCOSE BLD-MCNC: 121 MG/DL (ref 70–99)
HAPTOGLOBIN: 260 MG/DL (ref 30–200)
HAV IGM SER IA-ACNC: NORMAL
HCT VFR BLD CALC: 28 % (ref 40.5–52.5)
HCT VFR BLD CALC: 29.2 % (ref 40.5–52.5)
HCT VFR BLD CALC: 29.2 % (ref 40.5–52.5)
HEMOGLOBIN: 10 G/DL (ref 13.5–17.5)
HEMOGLOBIN: 9.7 G/DL (ref 13.5–17.5)
HEMOGLOBIN: 9.9 G/DL (ref 13.5–17.5)
HEPATITIS B CORE IGM ANTIBODY: NORMAL
HEPATITIS B SURFACE ANTIGEN INTERPRETATION: NORMAL
HEPATITIS C ANTIBODY INTERPRETATION: NORMAL
HYPERCHROMASIA: ABNORMAL
HYPERCHROMASIA: ABNORMAL
HYPOCHROMIA: ABNORMAL
HYPOCHROMIA: ABNORMAL
IRON SATURATION: 7 % (ref 20–50)
IRON: 12 UG/DL (ref 59–158)
LYMPHOCYTES ABSOLUTE: 0.1 K/UL (ref 1–5.1)
LYMPHOCYTES ABSOLUTE: 0.2 K/UL (ref 1–5.1)
LYMPHOCYTES ABSOLUTE: 0.3 K/UL (ref 1–5.1)
LYMPHOCYTES RELATIVE PERCENT: 1.9 %
LYMPHOCYTES RELATIVE PERCENT: 3 %
LYMPHOCYTES RELATIVE PERCENT: 4 %
MACROCYTES: ABNORMAL
MACROCYTES: ABNORMAL
MAGNESIUM: 2.3 MG/DL (ref 1.8–2.4)
MCH RBC QN AUTO: 28.9 PG (ref 26–34)
MCH RBC QN AUTO: 29.1 PG (ref 26–34)
MCH RBC QN AUTO: 29.3 PG (ref 26–34)
MCHC RBC AUTO-ENTMCNC: 33.9 G/DL (ref 31–36)
MCHC RBC AUTO-ENTMCNC: 34.3 G/DL (ref 31–36)
MCHC RBC AUTO-ENTMCNC: 34.5 G/DL (ref 31–36)
MCV RBC AUTO: 84.3 FL (ref 80–100)
MCV RBC AUTO: 85.1 FL (ref 80–100)
MCV RBC AUTO: 85.4 FL (ref 80–100)
METAMYELOCYTES RELATIVE PERCENT: 3 %
MICROCYTES: ABNORMAL
MICROCYTES: ABNORMAL
MONOCYTES ABSOLUTE: 0.4 K/UL (ref 0–1.3)
MONOCYTES ABSOLUTE: 0.5 K/UL (ref 0–1.3)
MONOCYTES ABSOLUTE: 0.5 K/UL (ref 0–1.3)
MONOCYTES RELATIVE PERCENT: 6 %
MONOCYTES RELATIVE PERCENT: 6.1 %
MONOCYTES RELATIVE PERCENT: 9 %
MYELOCYTE PERCENT: 1 %
NEUTROPHILS ABSOLUTE: 5.2 K/UL (ref 1.7–7.7)
NEUTROPHILS ABSOLUTE: 6.1 K/UL (ref 1.7–7.7)
NEUTROPHILS ABSOLUTE: 6.8 K/UL (ref 1.7–7.7)
NEUTROPHILS RELATIVE PERCENT: 85 %
NEUTROPHILS RELATIVE PERCENT: 86 %
NEUTROPHILS RELATIVE PERCENT: 91.1 %
OVALOCYTES: ABNORMAL
PDW BLD-RTO: 16.1 % (ref 12.4–15.4)
PDW BLD-RTO: 16.2 % (ref 12.4–15.4)
PDW BLD-RTO: 16.3 % (ref 12.4–15.4)
PELGER HUET CELLS: PRESENT
PLATELET # BLD: 11 K/UL (ref 135–450)
PLATELET # BLD: 24 K/UL (ref 135–450)
PLATELET # BLD: 36 K/UL (ref 135–450)
PLATELET SLIDE REVIEW: ABNORMAL
PMV BLD AUTO: 7.5 FL (ref 5–10.5)
PMV BLD AUTO: 8.3 FL (ref 5–10.5)
PMV BLD AUTO: 9.7 FL (ref 5–10.5)
POIKILOCYTES: ABNORMAL
POIKILOCYTES: ABNORMAL
POTASSIUM SERPL-SCNC: 3.3 MMOL/L (ref 3.5–5.1)
PROCALCITONIN: >100 NG/ML (ref 0–0.15)
PROCALCITONIN: >100 NG/ML (ref 0–0.15)
RBC # BLD: 3.32 M/UL (ref 4.2–5.9)
RBC # BLD: 3.41 M/UL (ref 4.2–5.9)
RBC # BLD: 3.42 M/UL (ref 4.2–5.9)
SODIUM BLD-SCNC: 127 MMOL/L (ref 136–145)
TEAR DROP CELLS: ABNORMAL
TOTAL IRON BINDING CAPACITY: 175 UG/DL (ref 260–445)
TOTAL PROTEIN: 5.2 G/DL (ref 6.4–8.2)
URIC ACID, SERUM: 9.9 MG/DL (ref 3.5–7.2)
WBC # BLD: 5.9 K/UL (ref 4–11)
WBC # BLD: 6.7 K/UL (ref 4–11)
WBC # BLD: 7.6 K/UL (ref 4–11)

## 2022-03-30 PROCEDURE — 2500000003 HC RX 250 WO HCPCS: Performed by: INTERNAL MEDICINE

## 2022-03-30 PROCEDURE — 2580000003 HC RX 258

## 2022-03-30 PROCEDURE — 83735 ASSAY OF MAGNESIUM: CPT

## 2022-03-30 PROCEDURE — 97530 THERAPEUTIC ACTIVITIES: CPT

## 2022-03-30 PROCEDURE — 6370000000 HC RX 637 (ALT 250 FOR IP): Performed by: STUDENT IN AN ORGANIZED HEALTH CARE EDUCATION/TRAINING PROGRAM

## 2022-03-30 PROCEDURE — 6360000002 HC RX W HCPCS: Performed by: STUDENT IN AN ORGANIZED HEALTH CARE EDUCATION/TRAINING PROGRAM

## 2022-03-30 PROCEDURE — 92526 ORAL FUNCTION THERAPY: CPT

## 2022-03-30 PROCEDURE — 84550 ASSAY OF BLOOD/URIC ACID: CPT

## 2022-03-30 PROCEDURE — 2580000003 HC RX 258: Performed by: INTERNAL MEDICINE

## 2022-03-30 PROCEDURE — 84145 PROCALCITONIN (PCT): CPT

## 2022-03-30 PROCEDURE — 51702 INSERT TEMP BLADDER CATH: CPT

## 2022-03-30 PROCEDURE — 80076 HEPATIC FUNCTION PANEL: CPT

## 2022-03-30 PROCEDURE — 97535 SELF CARE MNGMENT TRAINING: CPT

## 2022-03-30 PROCEDURE — 92610 EVALUATE SWALLOWING FUNCTION: CPT

## 2022-03-30 PROCEDURE — 92611 MOTION FLUOROSCOPY/SWALLOW: CPT

## 2022-03-30 PROCEDURE — 87070 CULTURE OTHR SPECIMN AEROBIC: CPT

## 2022-03-30 PROCEDURE — 36415 COLL VENOUS BLD VENIPUNCTURE: CPT

## 2022-03-30 PROCEDURE — 80048 BASIC METABOLIC PNL TOTAL CA: CPT

## 2022-03-30 PROCEDURE — 74230 X-RAY XM SWLNG FUNCJ C+: CPT

## 2022-03-30 PROCEDURE — 87205 SMEAR GRAM STAIN: CPT

## 2022-03-30 PROCEDURE — 2060000000 HC ICU INTERMEDIATE R&B

## 2022-03-30 PROCEDURE — 99233 SBSQ HOSP IP/OBS HIGH 50: CPT | Performed by: INTERNAL MEDICINE

## 2022-03-30 PROCEDURE — 2580000003 HC RX 258: Performed by: STUDENT IN AN ORGANIZED HEALTH CARE EDUCATION/TRAINING PROGRAM

## 2022-03-30 PROCEDURE — 85025 COMPLETE CBC W/AUTO DIFF WBC: CPT

## 2022-03-30 PROCEDURE — 97116 GAIT TRAINING THERAPY: CPT

## 2022-03-30 PROCEDURE — 97166 OT EVAL MOD COMPLEX 45 MIN: CPT

## 2022-03-30 PROCEDURE — 97161 PT EVAL LOW COMPLEX 20 MIN: CPT

## 2022-03-30 PROCEDURE — 2500000003 HC RX 250 WO HCPCS

## 2022-03-30 RX ORDER — POTASSIUM CHLORIDE 7.45 MG/ML
10 INJECTION INTRAVENOUS
Status: COMPLETED | OUTPATIENT
Start: 2022-03-30 | End: 2022-03-30

## 2022-03-30 RX ORDER — LIDOCAINE 4 G/G
1 PATCH TOPICAL ONCE
Status: COMPLETED | OUTPATIENT
Start: 2022-03-30 | End: 2022-03-30

## 2022-03-30 RX ORDER — CHLORPROMAZINE HYDROCHLORIDE 25 MG/ML
25 INJECTION INTRAMUSCULAR ONCE
Status: COMPLETED | OUTPATIENT
Start: 2022-03-30 | End: 2022-03-30

## 2022-03-30 RX ORDER — LINEZOLID 2 MG/ML
600 INJECTION, SOLUTION INTRAVENOUS EVERY 12 HOURS
Status: DISCONTINUED | OUTPATIENT
Start: 2022-03-30 | End: 2022-03-31

## 2022-03-30 RX ADMIN — ACETAMINOPHEN 325MG 650 MG: 325 TABLET ORAL at 15:38

## 2022-03-30 RX ADMIN — SODIUM CHLORIDE, PRESERVATIVE FREE 10 ML: 5 INJECTION INTRAVENOUS at 08:02

## 2022-03-30 RX ADMIN — ACETAMINOPHEN 325MG 650 MG: 325 TABLET ORAL at 23:09

## 2022-03-30 RX ADMIN — CHLORPROMAZINE HYDROCHLORIDE 25 MG: 25 INJECTION INTRAMUSCULAR at 01:08

## 2022-03-30 RX ADMIN — LINEZOLID 600 MG: 600 INJECTION, SOLUTION INTRAVENOUS at 23:09

## 2022-03-30 RX ADMIN — ACETAMINOPHEN 650 MG: 650 SUPPOSITORY RECTAL at 01:12

## 2022-03-30 RX ADMIN — PIPERACILLIN AND TAZOBACTAM 3375 MG: 3; .375 INJECTION, POWDER, LYOPHILIZED, FOR SOLUTION INTRAVENOUS at 18:38

## 2022-03-30 RX ADMIN — POTASSIUM CHLORIDE 10 MEQ: 10 INJECTION, SOLUTION INTRAVENOUS at 12:48

## 2022-03-30 RX ADMIN — POTASSIUM CHLORIDE 10 MEQ: 10 INJECTION, SOLUTION INTRAVENOUS at 13:43

## 2022-03-30 RX ADMIN — POTASSIUM CHLORIDE 10 MEQ: 10 INJECTION, SOLUTION INTRAVENOUS at 09:10

## 2022-03-30 RX ADMIN — SODIUM CHLORIDE, PRESERVATIVE FREE 10 ML: 5 INJECTION INTRAVENOUS at 21:06

## 2022-03-30 RX ADMIN — POTASSIUM CHLORIDE 10 MEQ: 10 INJECTION, SOLUTION INTRAVENOUS at 11:32

## 2022-03-30 RX ADMIN — SODIUM BICARBONATE: 84 INJECTION, SOLUTION INTRAVENOUS at 21:02

## 2022-03-30 RX ADMIN — PIPERACILLIN SODIUM AND TAZOBACTAM SODIUM 4500 MG: 4; .5 INJECTION, POWDER, LYOPHILIZED, FOR SOLUTION INTRAVENOUS at 12:20

## 2022-03-30 RX ADMIN — LINEZOLID 600 MG: 600 INJECTION, SOLUTION INTRAVENOUS at 12:23

## 2022-03-30 RX ADMIN — SODIUM BICARBONATE: 84 INJECTION, SOLUTION INTRAVENOUS at 05:51

## 2022-03-30 ASSESSMENT — PAIN DESCRIPTION - ONSET
ONSET: ON-GOING
ONSET: PROGRESSIVE

## 2022-03-30 ASSESSMENT — PAIN - FUNCTIONAL ASSESSMENT
PAIN_FUNCTIONAL_ASSESSMENT: PREVENTS OR INTERFERES SOME ACTIVE ACTIVITIES AND ADLS
PAIN_FUNCTIONAL_ASSESSMENT: PREVENTS OR INTERFERES SOME ACTIVE ACTIVITIES AND ADLS

## 2022-03-30 ASSESSMENT — PAIN DESCRIPTION - LOCATION
LOCATION: BACK
LOCATION: BACK

## 2022-03-30 ASSESSMENT — ENCOUNTER SYMPTOMS
EYE REDNESS: 0
DIARRHEA: 1
CHOKING: 0
SHORTNESS OF BREATH: 0
CHEST TIGHTNESS: 0
ABDOMINAL PAIN: 0
TROUBLE SWALLOWING: 0
CONSTIPATION: 0
ABDOMINAL DISTENTION: 1
VOMITING: 1
COUGH: 0
NAUSEA: 1

## 2022-03-30 ASSESSMENT — PAIN DESCRIPTION - FREQUENCY
FREQUENCY: INTERMITTENT
FREQUENCY: INTERMITTENT

## 2022-03-30 ASSESSMENT — PAIN SCALES - GENERAL
PAINLEVEL_OUTOF10: 0
PAINLEVEL_OUTOF10: 3
PAINLEVEL_OUTOF10: 0
PAINLEVEL_OUTOF10: 0
PAINLEVEL_OUTOF10: 8
PAINLEVEL_OUTOF10: 0
PAINLEVEL_OUTOF10: 0
PAINLEVEL_OUTOF10: 4

## 2022-03-30 ASSESSMENT — PAIN DESCRIPTION - ORIENTATION
ORIENTATION: LOWER
ORIENTATION: LEFT

## 2022-03-30 ASSESSMENT — PAIN DESCRIPTION - PAIN TYPE
TYPE: ACUTE PAIN
TYPE: ACUTE PAIN

## 2022-03-30 ASSESSMENT — PAIN DESCRIPTION - PROGRESSION
CLINICAL_PROGRESSION: GRADUALLY WORSENING
CLINICAL_PROGRESSION: GRADUALLY WORSENING

## 2022-03-30 ASSESSMENT — PAIN DESCRIPTION - DESCRIPTORS
DESCRIPTORS: ACHING;DISCOMFORT
DESCRIPTORS: ACHING;DISCOMFORT

## 2022-03-30 NOTE — CONSULTS
Urology Attending Consult Note      Reason for Consultation: Prostate cancer, AQUILINO and ureteral stone    History: 65 yo with aggressive metastatic Ghassan 5+4=9 PCA undergoing ADT and XRT. Also has had recent issues with retention requiring urethral dilations. Presented to [facility] with hypotension, fatigue/weakness and abdominal pain. Found to have AQUILINO, thrombocytopenia and hyponatremia. CT scan showed a 3mm LUVJ stone with upstream hydronephrosis. UA negative for infection, patient afebrile. He was admitted for further workup. Family History, Social History, Review of Systems:  Reviewed and agreed to as per chart    Vitals:  BP (!) 111/58   Pulse 106   Temp 98.1 °F (36.7 °C) (Oral)   Resp 28   Ht 5' 6\" (1.676 m)   Wt 189 lb 4 oz (85.8 kg)   SpO2 95%   BMI 30.55 kg/m²   Temp  Av.1 °F (36.7 °C)  Min: 97.8 °F (36.6 °C)  Max: 98.4 °F (36.9 °C)    Intake/Output Summary (Last 24 hours) at 3/30/2022 0957  Last data filed at 3/30/2022 2592  Gross per 24 hour   Intake 4730.62 ml   Output 1250 ml   Net 3480.62 ml         Physical:   Well developed, well nourished in no acute distress   Mood indicates no abnormalities. Pt doesnt appear depressed   Orientated to time and place   Neck is supple, trachea is midline   Respiratory effort is normal   Cardiovascular show no extremity swelling   Abdomen no masses or hernias are palpated, there is no tenderness. Liver and Spleen appear normal.   Skin show no abnormal lesions   Lymph nodes are not palpated in the inguinal, neck, or axillary area.      Male :   Voiding clear      Labs:  WBC:    Lab Results   Component Value Date    WBC 5.9 2022     Hemoglobin/Hematocrit:    Lab Results   Component Value Date    HGB 9.9 2022    HCT 29.2 2022     BMP:    Lab Results   Component Value Date     2022    K 3.3 2022    K 3.6 2022    CL 92 2022    CO2 19 2022     2022    LABALBU 2.3 2022 CREATININE 3.7 03/30/2022    CALCIUM 8.0 03/30/2022    GFRAA 20 03/30/2022    LABGLOM 16 03/30/2022     PT/INR:    Lab Results   Component Value Date    PROTIME 11.8 03/29/2022    INR 1.04 03/29/2022     PTT:    Lab Results   Component Value Date    APTT 30.7 03/29/2022   [APTT    Urinalysis: Negative    Imaging:   Impression       1.  Moderate left hydronephrosis with an obstructing 3 mm calculus at the left UVJ. 2.  Trace left pleural effusion with associated mild airspace consolidation which could represent atelectasis and/or pneumonia. Impression/Plan: 65 yo M with aggressive PCA sp ADT/XRT admitted with AQUILINO and hyponatremia. -Very lethargic during rounds. Hard to get clear answer from patient, but he was not reporting any abdominal or flank pain. He has been urinating adequately.  -Cr improved to 3.7 from 4.9. Sodium improving, platelets at 24  -UA appeared negative in ER. No UCx sent, will order  -Bladder scan every 6-8 hours  -Continue flomax   -CT scan showing a 3mm UVJ stone. Due to his current electrolyte and platelet abnormalities it would be best to give him a trial of passage. If he does not pass the stone and his numbers improve, we can do cystoscopy with URS.  Will follow closely    CLIF Ayoub

## 2022-03-30 NOTE — CARE COORDINATION
want to participate in local refill/ meds to beds program?:      Meds To Beds General Rules:  1. Can ONLY be done Monday- Friday between 8:30am-5pm  2. Prescription(s) must be in pharmacy by 3pm to be filled same day  3. Copy of patient's insurance/ prescription drug card and patient face sheet must be sent along with the prescription(s)  4. Cost of Rx cannot be added to hospital bill. If financial assistance is needed, please contact unit  or ;  or  CANNOT provide pharmacy voucher for patients co-pays  5.  Patients can then  the prescription on their way out of the hospital at discharge, or pharmacy can deliver to the bedside if staff is available. (payment due at time of pick-up or delivery - cash, check, or card accepted)     Able to afford home medications/ co-pay costs: Yes    ADLS  Support Systems: Family Members    PT AM-PAC:     OT AM-PAC:       [place]: Single Story Home   Steps: No     Plans to RETURN to current housing: Yes  Barriers to RETURNING to current housin [address]  Currently ACTIVE with  [employer] [address]: No  Home Care Agency: Not Applicable    Currently ACTIVE with [place] [facility] Aging: No  Passport/ Waiver: No  Passport/ Waiver Services: Not Applicable      Durable Medical Equipment  DME Provider: NA  Equipment: NA    Home Oxygen and [address] prior to admission: No  [address]: Not Applicable  Other Respiratory Equipment: NA    Informed of need to bring portable home O2 tank on day of DISCHARGE for nursing to connect prior to leaving: No  Verbalized agreement/Understanding: No  Person to bring portable tank at discharge: NA    Dialysis  Active with HD/PD prior to admission: No  Nephrologist: Christina Serna 61:  Not Applicable    DISCHARGE PLAN:  Disposition: Home- No Services Needed    Transportation PLAN for discharge: family     Factors facilitating achievement of predicted outcomes: Family support, Caregiver support, Friend support and Motivated    Barriers to discharge: Medical Clearance     Additional Case Management Notes: Patient from home alone. Patient is independent at baseline. Following for discharge needs. The Plan for Transition of Care is related to the following treatment goals of Hypovolemia [E86.1]  ARF (acute renal failure) ([facility] [address] [N17.9]  Thrombocytopenia (HCC) [D69.6]  AQUILINO (acute kidney injury) ([facility] [address] [N17.9]  Increased anion gap metabolic acidosis [Y67.9]    The Patient and/or patient representative Flora Weinstein and his family were provided with a choice of provider and agrees with the discharge plan Yes    Freedom of choice list was provided with basic dialogue that supports the patient's individualized plan of care/goals and shares the quality data associated with the providers.  Yes    Care Transition patient: Raissa Storm Child, [address]  Case Management Department  Ph: [phone]   Fax: [phone]

## 2022-03-30 NOTE — CONSULTS
Nephrology Consult Note                                                                                                                                                                                                                                                                                                                                                               Office : [phone]     Fax :[phone]              Patient's Name: Rena Ortiz  9:33 AM  3/30/2022    Reason for Consult: AQUILINO  Requesting Physician:  Mariah Barrett MD      Chief Complaint:  Kadie Calvillo        History of Present Ilness:    Rena Ortiz is a 66 y.o. male with prostate cancer with metastasis to the pelvis, asthma, lipoma resection, urinary retention, hernia repair who present to the ED with a chief complaint of hypotension, diarrhea, fatigue. Currently undergoing radiation therapy, but no chemotherapy for prostate cancer and at his last appointment a few days ago he was hypotensive to 70's/40's which improved after a 1L bolus. Last weekend he endorsed non-bloody diarrhea, intermittent cramping abdominal pain, generalized weakness, and N/V which resolved by Sunday. Family endorses increased weakness and decreased PO intake due to general malaise and N/V. He has increasing weakness for the last few weeks but was still able to walk and drive before the weekend, but has worsened since. He just started radiation therapy on 3/21 and has received one dose of hormonal therapy. Not on chemo and no plans for surgery. Patient complains of left back pain that radiates towards the groin. He denies fever, chills, chest pain, shortness of breath, dysuria, hematuria, melena, hematochezia, numbness, unilateral weakness, lower extremity weakness, bowel or bladder incontinence.     ED course:  Pt was hyponatremic to 121, creatinine 4.9, anion gap of 21, LA 1.2, BNP elevated to 7944 (last echo 11/2021 w/ EF 50% and indeterminate diastolic), , platelets 11, VBG 9.70/06.4/45. No acute changes to chest XR. Past Medical History:   Diagnosis Date    Asthma     Environmental allergies     Inguinal hernia     Prostate cancer [facility])        Past Surgical History:   Procedure Laterality Date    INGUINAL HERNIA REPAIR Bilateral 6/20/2019    LAPAROSCOPIC BILATERAL INGUINAL HERNIA REPAIR WITH MESH performed by Guy Torres MD at [address] [facility]      back        History reviewed. No pertinent family history. reports that he has never smoked. He has never used smokeless tobacco. He reports current alcohol use. He reports that he does not use drugs. Allergies:   Other    Current Medications:    lidocaine 4 % external patch 1 patch, Once  potassium chloride 10 mEq/100 mL IVPB (Peripheral Line), Q1H  tamsulosin (FLOMAX) capsule 0.4 mg, Daily  sodium chloride flush 0.9 % injection 5-40 mL, 2 times per day  sodium chloride flush 0.9 % injection 5-40 mL, PRN  0.9 % sodium chloride infusion, PRN  ondansetron (ZOFRAN-ODT) disintegrating tablet 4 mg, Q8H PRN   Or  ondansetron (ZOFRAN) injection 4 mg, Q6H PRN  polyethylene glycol (GLYCOLAX) packet 17 g, Daily PRN  acetaminophen (TYLENOL) tablet 650 mg, Q6H PRN   Or  acetaminophen (TYLENOL) suppository 650 mg, Q6H PRN  lidocaine 4 % external patch 1 patch, Daily  cefTRIAXone (ROCEPHIN) 1000 mg IVPB in 50 mL D5W minibag, Q24H  sodium bicarbonate 100 mEq in sodium chloride 0.45 % 1,000 mL infusion, Continuous  0.9 % sodium chloride infusion, PRN        Review of Systems:   14 point ROS obtained but were negative except mentioned in HPI      Physical exam:     Vitals:  BP (!) 111/58   Pulse 106   Temp 98.1 °F (36.7 °C) (Oral)   Resp 28   Ht 5' 6\" (1.676 m)   Wt 189 lb 4 oz (85.8 kg)   SpO2 95%   BMI 30.55 kg/m²   Constitutional:  OAA X3 NAD  Skin: no rash, turgor wnl  Heent:  eomi, mmm  Neck: no bruits or jvd noted  Cardiovascular:  S1, S2 without m/r/g  Respiratory: CTA B without w/r/r  Abdomen:  +bs, soft, nt, nd  Ext: No lower extremity edema  Psychiatric: mood and affect appropriate  Musculoskeletal:  Rom, muscular strength intact    Data:   Labs:  CBC:   Recent Labs     03/29/22 2058 03/30/22  0020 03/30/22  0320   WBC 5.9 7.6 5.9   HGB 10.6* 10.0* 9.9*   PLT 7* 36* 24*     BMP:    Recent Labs     03/29/22  1135 03/30/22  0320   * 127*   K 3.6 3.3*   CL 85* 92*   CO2 15* 19*   * 124*   CREATININE 4.9* 3.7*   GLUCOSE 145* 121*     Ca/Mg/Phos:   Recent Labs     03/29/22  1135 03/30/22  0320   CALCIUM 8.1* 8.0*   MG  --  2.30     Hepatic:   Recent Labs     03/29/22  1135 03/30/22  0320   * 96*   ALT 78* 58*   BILITOT 1.3* 1.7*   ALKPHOS 226* 223*     Troponin:   Recent Labs     03/29/22  1135   TROPONINI <0.01     BNP: No results for input(s): BNP in the last 72 hours. Lipids: No results for input(s): CHOL, TRIG, HDL, LDLCALC, LABVLDL in the last 72 hours. ABGs: No results for input(s): PHART, PO2ART, ARD6FUD in the last 72 hours. INR:   Recent Labs     03/29/22  1135   INR 1.04     UA:  Recent Labs     03/29/22  1453   COLORU Yellow   CLARITYU Clear   GLUCOSEU Negative   BILIRUBINUR SMALL*   KETUA TRACE*   SPECGRAV 1.020   BLOODU MODERATE*   PHUR 6.0   PROTEINU TRACE*   UROBILINOGEN 0.2   NITRU Negative   LEUKOCYTESUR SMALL*   LABMICR YES   Faviola Yañez NotGiven      Urine Microscopic:   Recent Labs     03/29/22  1453   BACTERIA 2+*   WBCUA 3-5   RBCUA 0-2   EPIU 0-1     Urine Culture: No results for input(s): LABURIN in the last 72 hours. Urine Chemistry:   Recent Labs     03/29/22  1453   LABCREA 80.1   NAUR <20             IMAGING:  US ABDOMEN LIMITED   Final Result   IMPRESSION :      Gallbladder polyps, the largest of which measures 6 x 5 mm. One year follow up ultrasound recommended. Left hydronephrosis, as noted on the prior study. Mildly heterogeneous increased hepatic echogenicity. Findings may reflect mild steatosis.          US RENAL COMPLETE   Final Result   IMPRESSION :      Gallbladder polyps, the largest of which measures 6 x 5 mm. One year follow up ultrasound recommended. Left hydronephrosis, as noted on the prior study. Mildly heterogeneous increased hepatic echogenicity. Findings may reflect mild steatosis. CT ABDOMEN PELVIS WO CONTRAST Additional Contrast? None   Final Result      1. Moderate left hydronephrosis with an obstructing 3 mm calculus at the left UVJ. 2.  Trace left pleural effusion with associated mild airspace consolidation which could represent atelectasis and/or pneumonia. POC US [facility]   Final Result      XR CHEST PORTABLE   Final Result   1. No interval changes. FL MODIFIED BARIUM SWALLOW W VIDEO    (Results Pending)       Assessment/Plan   AQUILINO  UTI  Cr on admission was 4.9. Baseline was 0.9. Patient endorsing left flank pain. Recently having poor PO intake. 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     -Cr today 3.7 - improving   -Urology consulted- Leblanc placed   -Serum osmolality 315 / Urine osmol 397 / Urine Na <20   -Continue Ceftriaxone  3/29 -   -Continue Na Bicarb 50/h    Anemia  Hx of prostate cancer with mets. Started radiation therapy on 3/21. S/p 1 round of hormonal therapy     Acid- base/ Electrolyte imbalance   Hyponatremia   Hypokalemia   AG of 19 on admission. Na 127. Likely due to diarrhea. LA WNL       Thank you for allowing us to participate in care of Jannet Munson MD   PGY-1 Internal Medicine    Patient will be discussed with attending, Dr. Alta Mulligan.     [facility] [address]  Office : [phone]  Fax :[phone]      Cr better   Good UO   Labs in am     Recommend to dose adjust all medications  based on renal functions  Maintain SBP> 90 mmHg   Daily weights   AVOID NSAIDs  Avoid Nephrotoxins  Monitor Intake/Output  Call if significant decrease in urine Mikki Florence MD

## 2022-03-30 NOTE — PROGRESS NOTES
Physical Therapy    Facility/Department: [address]  Initial Assessment / treatment    NAME: Philly Pearson  : [id number]  MRN: [id number]    Date of Service: 3/30/2022    Discharge Recommendations: Philly Pearson scored a 14/24 on the AM-PAC short mobility form. Current research shows that an AM-PAC score of 17 or less is typically not associated with a discharge to the patient's home setting. Based on the patient's AM-PAC score and their current functional mobility deficits, it is recommended that the patient have 3-5 sessions per week of Physical Therapy at d/c to increase the patient's independence. Please see assessment section for further patient specific details. If patient discharges prior to next session this note will serve as a discharge summary. Please see below for the latest assessment towards goals. PT Equipment Recommendations  Other: may need rolling walker if pt d/cs home    Assessment   Body structures, Functions, Activity limitations: Decreased functional mobility ; Decreased strength;Decreased balance  Assessment: Pt is 66 y.o. male admit with hypotension and fatigue. Pt demos decreased command following,  Fatigues quickly. Pt is below his functional baseline. Not safe to amb alone. Rec continued IP PT. Will follow  Treatment Diagnosis: impaired gait and transfers  Prognosis: Good  Decision Making: Low Complexity  PT Education: PT Role;Functional Mobility Training  Patient Education: pt demos partial understanding; rec reinforcement. REQUIRES PT FOLLOW UP: Yes       Patient Diagnosis(es): The primary encounter diagnosis was Hypovolemia. Diagnoses of AQUILINO (acute kidney injury) ([address] Thrombocytopenia ([address] and Increased anion gap metabolic acidosis were also pertinent to this visit. has a past medical history of Asthma, Environmental allergies, Inguinal hernia, and Prostate cancer ([address]    has a past surgical history that includes lipoma resection and Inguinal hernia repair (Bilateral, 6/20/2019). Restrictions  Position Activity Restriction  Other position/activity restrictions: up with assist  Vision/Hearing  Vision: Impaired  Vision Exceptions: Wears glasses for reading  Hearing: Within functional limits     Subjective  General  Chart Reviewed: Yes  Additional Pertinent Hx: Pt has prostate cancer with mets to pelvis, undergoing radiation therapy. He came to ED with c/o hypotension, diarrhea, fatigue-dx of hypovalemia, AQUILINO, thrombocytopenia-CXR=neg, abd/pelvis CT=mod L hydronephrosis with obstructing calculus, atelectasis and/or pneumonia, abd US=gallbladder polyps, renal US=L hydronephrosis, MBS=no laryngeal penetration or aspiration; PMHx: prostate CA, asthma, hernia repair, urinary retention  Referring Practitioner: Michelle Valenzuela MD  Diagnosis: hypovolemia  Subjective  Subjective: Pt found supine in bed. Agrees to PT. Reports 3/10 back pain - RN notified and obtaining meds.   Pain Screening  Patient Currently in Pain: No          Orientation  Orientation  Overall Orientation Status: Within Functional Limits  Social/Functional History  Social/Functional History  Lives With: Alone  Type of Home: House  Home Layout: One level  Home Access:  (1 step + small slant to door)  Bathroom Shower/Tub: Tub/Shower unit  Bathroom Toilet: Standard (sink nex to commode)  Bathroom Equipment:  (none)  Home Equipment:  (none)  ADL Assistance: Independent  Homemaking Responsibilities: Yes  Ambulation Assistance: Independent  Transfer Assistance: Independent  Active : Yes  Occupation: Retired  Type of occupation: worked in a [address] [place] [address] Street: cut grass, garden  Cognition        Objective          AROM RLE (degrees)  RLE AROM: WFL  AROM LLE (degrees)  LLE AROM : WFL  Strength RLE  Comment: at least 3+/5 throughout  Strength LLE  Comment: at least 3+/5 throughout        Bed mobility  Supine to Sit: Moderate assistance (HOB elevated, effortful)  Scooting: Contact guard assistance  Transfers  Sit to Stand: Contact guard assistance (multiple cues for safe hand placement)  Stand to sit: Contact guard assistance  Ambulation  Ambulation?: Yes  Ambulation 1  Device: Standard Walker  Assistance: Minimal assistance  Quality of Gait: slow, effortful, multiple cues for technique, unsteady but no overt LOB  Distance: 2 ft bed to chair  Comments: distance limited due to pain and fatigue     Balance  Sitting - Static: Fair  Sitting - Dynamic: Fair  Standing - Static: Fair  Standing - Dynamic: Fair      Treatment included gait and transfer training, pt education. Plan   Plan  Times per week: 2-5  Current Treatment Recommendations: Balance Training,Functional Mobility Training,Transfer Training,Gait Training,Strengthening,Patient/Caregiver Education & Training,Equipment Evaluation, Education, & procurement  Safety Devices  Type of devices: Call light within reach,Chair alarm in place,Gait belt,Nurse notified,Left in chair (LEs elevated)    G-Code       OutComes Score                                                  AM-PAC Score  AM-PAC Inpatient Mobility Raw Score : 14 (03/30/22 1612)  -PAC Inpatient T-Scale Score : 38.1 (03/30/22 1612)  Mobility Inpatient CMS 0-100% Score: 61.29 (03/30/22 1612)  Mobility Inpatient CMS G-Code Modifier : CL (03/30/22 1612)          Goals  Short term goals  Time Frame for Short term goals: discharge  Short term goal 1: Pt will transfer supine <--> sit with SBA  Short term goal 2: Pt will transfer sit <--> stand with SBA  Short term goal 3: Pt will amb 50 ft with walker and SBA  Patient Goals   Patient goals : return home       Therapy Time   Individual Concurrent Group Co-treatment   Time In 1532         Time Out 1557         Minutes 25                 Timed Code Treatment Minutes:  10    Total Treatment Minutes:  25    If patient is discharged prior to next treatment, this note will serve as the discharge summary.   Romana Dunning, PT, DPT  [id number]

## 2022-03-30 NOTE — PROGRESS NOTES
Pt admitted to room 3525. Pt AxOx4 and VSS. Pt oriented to room and to call light Pt placed on tele per orders. Pt placed on fall precautions with gripper socks, fall wristband and bed alarm in place. Pt skin and admission assessment complete. Pt instructed to call for any further needs. Pt call light within reach. Pt verbalized understanding. Will continue to monitor.

## 2022-03-30 NOTE — DISCHARGE SUMMARY
INTERNAL MEDICINE DEPARTMENT AT [address] [place] [address]  DISCHARGE SUMMARY    Patient ID: Brigette Navarro                                             Discharge Date: 4/4/2022   Patient's PCP: Alma Boudreaux MD                                          Discharge Physician: July Coreas MD MD  Admit Date: 3/29/2022   Admitting Physician: Lay Rosales DO    PROBLEMS DURING HOSPITALIZATION:  Patient Active Problem List   Diagnosis    Non-recurrent bilateral inguinal hernia without obstruction or gangrene    [facility]    Hypovolemia    Increased anion gap metabolic acidosis    Thrombocytopenia ([facility]    Prostate cancer [facility]       Hospital Course:   Maylene Dakin is a 66year old Male with a PmHx of prostate cancer with metastasis to the pelvis, asthma, lipoma resection, urinary retention, hernia repair who present to the ED with a chief complaint of hypotension, diarrhea, fatigue. Currently undergoing radiation therapy, but no chemotherapy for prostate cancer and at his last appointment a few days ago he was hypotensive to 70's/40's which improved after a 1L bolus. Last weekend he endorsed non-bloody diarrhea, intermittent cramping abdominal pain, generalized weakness, and N/V which resolved by Sunday. Family endorses increased weakness and decreased PO intake due to general malaise and N/V. He has increasing weakness for the last few weeks but was still able to walk and drive before the weekend, but has worsened since. He just started radiation therapy on 3/21 and has received one dose of hormonal therapy. Not on chemo and no plans for surgery. Does endorse some burning and discomfort upon urination. He was able a weak ago to drive himself home and now he is substantially more weak. In the ED, he was hyponatremic to 121, creatinine 4.9, anion gap of 21, LA 1.2, BNP elevated to 7944 (last echo 11/2021 w/ EF 50% and indeterminate diastolic), , platelets 11, VBG 3.64/41.7/11.  No acute changes to chest XR. During this hospital admission he was diagnosed with hypotension and hypovolemic hyponatremia likely 2/2 viral gastroenteritis and poor PO intake. Improved with IVF. He had an AQUILINO with a hx of urinary retention. He was found to have an obstructing stone in L ureter causing hydro, urology was following as an outpatient. Oncology consulted for pelvic cancer. Patient was thrombocytopenic likely due to recent radiation tx vs. Sepsis from UTI treated with antibiotics. He had an elevated procal and was hypotensive responding to IVF, started on Zosyn and Zyvox. No growth on cultures, Zyvox taken off, will discharge on levofloxacin. Thrombocytopenia prompted a bone marrow biopsy that has not yet resulted. His strength and lab counts improved with antibiotics and IVF. AQUILINO and lactic acidosis improved with IVF. At time of discharge he is HDS, good I/O, with no acute complaints. Physical Exam:  BP (!) 111/57   Pulse 79   Temp 98.1 °F (36.7 °C) (Oral)   Resp 11   Ht 5' 6\" (1.676 m)   Wt 158 lb 8.2 oz (71.9 kg)   SpO2 98%   BMI 25.58 kg/m²   Constitutional:       General: He is not in acute distress. HENT:      Head: Normocephalic.      Right Ear: External ear normal.      Left Ear: External ear normal.      Nose: Nose normal.     Pharynx: Oropharynx is clear. No oropharyngeal exudate. Eyes:      Extraocular Movements: Extraocular movements intact.      Conjunctiva/sclera: Conjunctivae normal.      Pupils: Pupils are equal, round, and reactive to light. Cardiovascular:      Rate and Rhythm: Normal rate and regular rhythm.      Pulses: Normal pulses.      Heart sounds: No friction rub. No gallop. + murmur heard best at the SANTI border  Pulmonary:      Effort: Pulmonary effort is normal. No respiratory distress.      Breath sounds: No stridor. No wheezing, rhonchi. Slight crackles at lung bases with decreased breath sounds.   Abdominal:      General: There is no distension     Palpations: Abdomen is soft.      Tenderness: There is no abdominal tenderness. There is no right CVA tenderness, left CVA tenderness, guarding or rebound. Musculoskeletal:         General: No deformity.      Now has bilateral LE swelling, 2+ pitting edema  Skin:     General: Skin is warm.      Coloration: Skin is not jaundiced.      Findings: No bruising, erythema or lesion. Neurological:      Mental Status: Alert and oriented x3     Cranial Nerves: No cranial nerve deficit.      Motor: Weakness present.      Comments: Weakness most prominent in the b/l LE 4/5. Full strength in b/l UE. Difficulty leaning forward.  Strength has improved    Consults: Urology, nephrology, oncology  Significant Diagnostic Studies:  Bone marrow biopsy,   Treatments: IVF, platelet transfusion, antibiotics  Disposition: SNF  Discharged Condition: Stable  Follow Up: Primary Care Physician in one week    DISCHARGE MEDICATION:     Medication List      ASK your doctor about these medications    acetaminophen 500 MG tablet  Commonly known as: TYLENOL     lisinopril-hydroCHLOROthiazide 20-12.5 MG per tablet  Commonly known as: PRINZIDE;ZESTORETIC  Take 1 tablet by mouth daily     tamsulosin 0.4 MG capsule  Commonly known as: FLOMAX  TAKE 1 CAPSULE BY MOUTH EVERY DAY          Activity: activity as tolerated  Diet: regular diet  Wound Care: none needed    Time Spent on discharge is more than 30 minutes    Signed:  Maddie Morales MD  4/4/2022

## 2022-03-30 NOTE — PROGRESS NOTES
Pharmacy Note - Renal Dosing and Extended Infusion Beta-Lactam Adjustment    Piperacillin/Tazobactam ordered for treatment of suspected sepsis 2/2 UTI. Per [facility] Renal Dose Adjustment Policy and Extended Infusion Beta-Lactam Policy, dose will be changed to 3375 mg q12h. Estimated Creatinine Clearance: Estimated Creatinine Clearance: 19 mL/min (A) (based on SCr of 3.7 mg/dL (H)). Dialysis Status, AQUILINO, CKD: AQUILINO  BMI: Body mass index is 30.55 kg/m². Rationale for Adjustment: Agent is renally eliminated and demonstrates time-dependent effect on bacterial eradication. Extended-infusion dosing strategy aims to enhance microbiologic and clinical efficacy. Pharmacy will continue to monitor renal function, cultures and sensitivities (where available) and adjust dose as necessary. Please call with any questions.   Hayley Del Toro, PharmD  Main Pharmacy: [id number]  3/30/2022 10:13 AM

## 2022-03-30 NOTE — PROGRESS NOTES
4 Eyes Admission Assessment     I agree as the admission nurse that 2 RN's have performed a thorough Head to Toe Skin Assessment on the patient. ALL assessment sites listed below have been assessed on admission. Areas assessed by both nurses: Deniz Sers  [x]   Head, Face, and Ears   [x]   Shoulders, Back, and Chest  [x]   Arms, Elbows, and Hands   [x]   Coccyx, Sacrum, and Ischium  [x]   Legs, Feet, and Heels        Does the Patient have Skin Breakdown? Yes a wound was noted on the Admission Assessment and an LDA was Initiated documentation include the Benita-wound, Wound Assessment, Measurements, Dressing Treatment, Drainage, and Color\",       Pt noted to have healing abrasion to R thigh, and blanchable redness to buttocks.    Brian Prevention initiated:  Yes   Wound Care Orders initiated:  No      [facility] nurse consulted for Pressure Injury (Stage 3,4, Unstageable, DTI, NWPT, and Complex wounds) or Brian score 18 or lower:  No      Nurse 1 eSignature: Electronically signed by Thony Blue RN on 3/29/22 at 10:31 PM EDT    **SHARE this note so that the co-signing nurse is able to place an eSignature**    Nurse 2 eSignature: Electronically signed by Mikhail Duarte RN on 3/30/22 at 2:49 AM EDT

## 2022-03-30 NOTE — PROGRESS NOTES
Speech Language Pathology  Facility/Department: [address]   CLINICAL BEDSIDE SWALLOW EVALUATION    NAME: Inga Malin  : [birth year]  MRN: [id number]    ADMISSION DATE: 3/29/2022  ADMITTING DIAGNOSIS: has Non-recurrent bilateral inguinal hernia without obstruction or gangrene; AQUILINO (acute kidney injury) ([address] Hypovolemia; Increased anion gap metabolic acidosis; and Thrombocytopenia ([facility]) on their problem list.  ONSET DATE:  3/29/22    Recent CXR: (3/29/22)  Narrative   Chest AP portable at 1118       INDICATIONS: Hypertension       Comparison with 2022       Trachea is midline   Aorta is mildly tortuous   The heart is at the upper limits of normal in size       Apical calcified granulomatous residuals are of decreased conspicuity on the current radiograph       No active airspace disease or effusion   No congestive changes       No osseous destructive lesions       Impression   1. No interval changes.        Date of Eval: 3/30/2022  Evaluating Therapist: ANALI Gallagher    Current Diet level:  Current Diet : NPO  Current Liquid Diet : NPO    Primary Complaint  None stated    Pain:  Pain Assessment  Pain Assessment: 0-10  Pain Level: 0  Patient's Stated Pain Goal: No pain  Pain Type: Acute pain  Pain Location: Back  Pain Orientation: Left  Pain Descriptors: Aching,Discomfort  Pain Frequency: Intermittent  Pain Onset: Progressive  Clinical Progression: Gradually worsening  Functional Pain Assessment: Prevents or interferes some active activities and ADLs  Non-Pharmaceutical Pain Intervention(s): Rest,Repositioned,Emotional support (pain medication given)  Response to Pain Intervention: Asleep with RR greater than 10    Reason for Referral  Inga Malin was referred for a bedside swallow evaluation to assess the efficiency of his swallow function, identify signs and symptoms of aspiration and make recommendations regarding safe dietary consistencies, effective compensatory strategies, and safe eating environment. Admission H&P (3/29/22):  Michael Spears is a 66year old Male with a PmHx of prostate cancer with metastasis to the pelvis, asthma, lipoma resection, urinary retention, hernia repair who present to the ED with a chief complaint of hypotension, diarrhea, fatigue. Currently undergoing radiation therapy, but no chemotherapy for prostate cancer and at his last appointment a few days ago he was hypotensive to 70's/40's which improved after a 1L bolus. Last weekend he endorsed non-bloody diarrhea, intermittent cramping abdominal pain, generalized weakness, and N/V which resolved by Sunday. Family endorses increased weakness and decreased PO intake due to general malaise and N/V. He has increasing weakness for the last few weeks but was still able to walk and drive before the weekend, but has worsened since. He just started radiation therapy on 3/21 and has received one dose of hormonal therapy. Not on chemo and no plans for surgery. Does endorse some burning and discomfort upon urination. He was able a weak ago to drive himself home and now he is substantially more weak. He denies fever, chills, chest pain, shortness of breath, dysuria, hematuria, melena, hematochezia, numbness, unilateral weakness, lower extremity weakness, bowel or bladder incontinence. In the ED, he is hyponatremic to 121, creatinine 4.9, anion gap of 21, LA 1.2, BNP elevated to 7944 (last echo 11/2021 w/ EF 50% and indeterminate diastolic), , platelets 11, VBG 6.84/77.7/92. No acute changes to chest XR. Impression  Dysphagia Diagnosis: Moderate pharyngeal stage dysphagia; Suspected needs further assessment  Dysphagia Impression:  Pt is cooperative and able to participate in oral care with assistance. He is oriented to self and time (2022); however he is disoriented to [facility]).   Pt demonstrates suspected moderate pharyngeal swallow deficits (including overt clinical s/s aspiration (strong wet cough), multiple swallows per bolus, frequent grimacing/gasping with swallows of thin liquids). Recommend instrumental assessment (MBS) of pt's swallow function and NPO pending MBS findings. Dysphagia Outcome Severity Scale: Level 3: Moderate dysphagia- Total assisstance, supervision or strategies. Two or more diet consistencies restricted     Treatment Plan  Requires SLP Intervention: Yes  Duration/Frequency of Treatment: 3-5x/week xLOS (or as determined by MBS)  D/C Recommendations: To be determined    Recommended Diet and Intervention  Diet Solids Recommendation: NPO  Liquid Consistency Recommendation: NPO  Recommended Form of Meds:  (as determined by MBS)  Recommendations: NPO;Modified barium swallow study    Compensatory Swallowing Strategies  TBD based on MBS findings    Treatment/Goals  Short-term Goals  Goal 1: The patient/caregiver will demonstrate understanding of recommendations for improved swallowing safety. Dysphagia Goals: The patient will tolerate instrumental swallowing procedure    General  Chart Reviewed: Yes  Behavior/Cognition: Alert; Cooperative  Temperature Spikes Noted: No (per chart)  Respiratory Status: Room air  Breath Sounds: Clear;Diminished  O2 Device: None (Room air)  Communication Observation: Functional  Follows Directions: Simple  Dentition: Adequate  Patient Positioning: Upright in bed  Baseline Vocal Quality: Normal  Volitional Cough: Strong  Prior Dysphagia History: none reported or noted in EMR    Vision/Hearing  Vision  Vision: Within Functional Limits (for this assessment)  Hearing  Hearing: Within functional limits (for this assessment)    Oral Motor Deficits  Oral/Motor  Oral Motor: Within functional limits; Oral care completed with toothbrush and toothpaste. Oral Phase Dysfunction  Oral Phase  Oral Phase: WFL  Oral Phase  Oral Phase - Comment: Adequate labial seal, adequate oral clearance.      Indicators of Pharyngeal Phase Dysfunction  Pharyngeal Phase  Pharyngeal Phase: Exceptions  Indicators of Pharyngeal Phase Dysfunction  Cough - Immediate: Puree - teaspoon  Pharyngeal Phase   Pharyngeal: Pt demonstrates overt clinical s/s aspiration (strong wet cough) inconsistently with puree. Pt requires multiple swallows per bolus. Pt demonstrates frequent grimacing/gasping with swallows of thin liquids, but he is unable to attribute to pain or globus sensation. Prognosis  Prognosis  Prognosis for safe diet advancement: fair  Individuals consulted  Consulted and agree with results and recommendations: Patient    Education  Patient Education: SLP attempted to educate pt re: role of SLP, anatomy and physiology of swallow, s/s aspiration to report, and risks of aspiration; pt needs reinforcement. Patient Education Response: Needs reinforcement  Safety Devices in place: Yes  Type of devices:  All fall risk precautions in place;Nurse notified       Therapy Time  SLP Individual Minutes  Time In: 0815  Time Out: 0845  Minutes: 30            Electronically Signed by:  Mere Santana., [id number] [facility]  Speech-Language Pathologist  [place] [phone] [phone]  Pager #[phone]  3/30/2022 9:12 AM

## 2022-03-30 NOTE — PROGRESS NOTES
Occupational Therapy   Occupational Therapy Initial Assessment/tx  Date: 3/30/2022   Patient Name: Darrel Joel  MRN: [id number]     : 1950    Date of Service: 3/30/2022    Discharge Recommendations:  Darrel Joel scored a 16/24 on the AM-PAC ADL Inpatient form. Current research shows that an AM-PAC score of 17 or less is typically not associated with a discharge to the patient's home setting. Based on the patient's AM-PAC score and their current ADL deficits, it is recommended that the patient have 3-5 sessions per week of Occupational Therapy at d/c to increase the patient's independence. Please see assessment section for further patient specific details. If patient discharges prior to next session this note will serve as a discharge summary. Please see below for the latest assessment towards goals. OT Equipment Recommendations  Other: shower chair, possibly raised commode with arms    Assessment   Performance deficits / Impairments: Decreased functional mobility ; Decreased ADL status; Decreased endurance  Assessment: Pt admitted with hypotension, diarrhea, fatigue-has prostate cancer and undergoing radiation tx. He is functioning below baseline, needing A with bed mobility, ADLs, transfers, and standing. Pt has limited activity tolerance, needing frequent rest breaks. He reports he has 2 brothers and sister that could assist him at discharge. Recommend ongoing inpatient OT at d/c-if pt returns home, he needs 24 hr A and home OT. Treatment Diagnosis: impaired ADLs and mobility  Prognosis: Good  Decision Making: Medium Complexity  OT Education: OT Role;Transfer Training  Patient Education: needs reinforcement  REQUIRES OT FOLLOW UP: Yes  Activity Tolerance  Activity Tolerance: Patient limited by fatigue  Safety Devices  Safety Devices in place: Yes  Type of devices: Left in chair;Nurse notified;Call light within reach; Chair alarm in place           Patient Diagnosis(es): The primary encounter diagnosis was Hypovolemia. Diagnoses of AQUILINO (acute kidney injury) ([facility] [address] Thrombocytopenia ([address] and Increased anion gap metabolic acidosis were also pertinent to this visit. has a past medical history of Asthma, Environmental allergies, Inguinal hernia, and Prostate cancer ([address] has a past surgical history that includes lipoma resection and Inguinal hernia repair (Bilateral, 6/20/2019). Treatment Diagnosis: impaired ADLs and mobility      Restrictions  Position Activity Restriction  Other position/activity restrictions: up with assist    Subjective   General  Chart Reviewed: Yes  Additional Pertinent Hx: PMH:  asthma, prostate cancer, hernia repair, urinary retention  Family / Caregiver Present: No  Referring Practitioner: Dr. Hughes  Diagnosis: Pt has prostate cancer with mets to pelvis, undergoing radiation therapy. He came to ED with c/o hypotension, diarrhea, fatigue-dx of hypovalemia, AQUILINO, thrombocytopenia-CXR=neg, abd/pelvis CT=mod L hydronephrosis with obstructing calculus, atelectasis and/or pneumonia, abd US=gallbladder polyps, renal US=L hydronephrosis, MBS=no laryngeal penetration or aspiration  Subjective  Subjective: Pt in bed when OT arrived. He is agreeable to work with OT.   Patient Currently in Pain: Yes (low back, not rated, Rn aware)  Vital Signs  Patient Currently in Pain: Yes (low back, not rated, Rn aware)  Social/Functional History  Social/Functional History  Lives With: Alone  Type of Home: House  Home Layout: One level  Home Access:  (1+1)  Bathroom Shower/Tub: Tub/Shower unit  Bathroom Toilet: Standard (sink nex to commode)  ADL Assistance: Independent  Homemaking Responsibilities: Yes  Ambulation Assistance: Independent  Transfer Assistance: Independent  Active : Yes  Occupation: Retired  Type of occupation: worked in a [address] [place] [address] Street: [employer]       Objective   Vision: Impaired  Vision Exceptions: Wears glasses for reading  Hearing: Within 0-100% Score: 53.32 (03/30/22 1436)  ADL Inpatient CMS G-Code Modifier : CK (03/30/22 1436)    Goals  Short term goals  Time Frame for Short term goals: discharge  Short term goal 1: pt to transfer to commode with CGA  Short term goal 2: pt to don hospital pants vs brief with mod A  Short term goal 3: pt to stand for 3-6 minutes with CGA/SBA while doing functional tasks  Patient Goals   Patient goals : not stated       Therapy Time   Individual Concurrent Group Co-treatment   Time In 1345         Time Out 1431         Minutes 46              Timed Code Treatment Minutes:   31    Total Treatment Minutes:  [address], OTR/L [address]

## 2022-03-30 NOTE — PROGRESS NOTES
Around 1300 patient voided 200ml, RN bladder scanned patient to find post void residual to be 430ml. RN notified MD who ordered coates placement.

## 2022-03-30 NOTE — PROCEDURES
INSTRUMENTAL SWALLOW REPORT  MODIFIED BARIUM SWALLOW  & Treatment Note    NAME: Marco Antonio Muñoz   : [birth year]  MRN: [id number]       Date of Eval: 3/30/2022     Ordering Physician: Dr. Caroline Buchanan  Radiologist: Dr. Santos Pacheco     Referring Diagnosis(es): Referring Diagnosis: Dysphagia    Past Medical History:  has a past medical history of Asthma, Environmental allergies, Inguinal hernia, and Prostate cancer ([facility] [address] Past Surgical History:  has a past surgical history that includes lipoma resection and Inguinal hernia repair (Bilateral, 2019). Current Diet Solid Consistency: NPO  Current Diet Liquid Consistency: NPO    Date of Prior Study: NA  Type of Study: Initial MBS       Recent CXR: Date 2022  Impression   1. No interval changes. Patient Complaints/Reason for Referral:  Marco Antonio Muñoz was referred for a MBS to assess the efficiency of his/her swallow function, assess for aspiration, and to make recommendations regarding safe dietary consistencies, effective compensatory strategies, and safe eating environment. Patient complaints: Patient did not state    Onset of problem:   Date of Onset: 2022    General Comment  Comments: Per admitting H&P (2022): 'Chinmay Carey is a 66year old Male with a PmHx of prostate cancer with metastasis to the pelvis, asthma, lipoma resection, urinary retention, hernia repair who present to the ED with a chief complaint of hypotension, diarrhea, fatigue. Currently undergoing radiation therapy, but no chemotherapy for prostate cancer and at his last appointment a few days ago he was hypotensive to 70's/40's which improved after a 1L bolus. Last weekend he endorsed non-bloody diarrhea, intermittent cramping abdominal pain, generalized weakness, and N/V which resolved by . Family endorses increased weakness and decreased PO intake due to general malaise and N/V.  He has increasing weakness for the last few weeks but was still able to walk and drive before the weekend, but has worsened since. He just started radiation therapy on 3/21 and has received one dose of hormonal therapy. Not on chemo and no plans for surgery. Does endorse some burning and discomfort upon urination. He was able a weak ago to drive himself home and now he is substantially more weak. He denies fever, chills, chest pain, shortness of breath, dysuria, hematuria, melena, hematochezia, numbness, unilateral weakness, lower extremity weakness, bowel or bladder incontinence. In the ED, he is hyponatremic to 121, creatinine 4.9, anion gap of 21, LA 1.2, BNP elevated to 7944 (last echo 11/2021 w/ EF 50% and indeterminate diastolic), , platelets 11, VBG 0.75/03.6/07. No acute changes to chest XR.'  Subjective  Subjective: Patient awake, alert, seated upright in stretcher, on room air. Behavior/Cognition/Vision/Hearing:  Behavior/Cognition: Alert  Vision: Within Functional Limits  Hearing: Within functional limits    Impressions:  Oral Phase: Mild oral dysphagia characterized by prolonged mastication of regular texture solids, with slowed a-p transit, reduced tongue base retraction, and mild oral stasis (cleared with subsequent swallow/liquid). Pharyngeal: Mild pharyngeal phase dysphagia characterized by delayed and reduced hyolarygneal mechanics and pharyngeal peristalsis resulting in premature spillage of bolus to valleculae/pyrifroms and mild pharyngeal residue. Of note, adequate airway protection throughout, with no aspiration or penetration visualized; cough/throat clear occured x1, however was not associated with any aspiration. Upper Esophageal Screen: Indirectly assessed; appeared to have adequate UES opening, no reflux visualized. Dysphagia Outcome Severity Scale: Level 5: Mild dysphagia- Distant supervision.  May need one diet consistency restricted  Penetration-Aspiration Scale (PAS): 1 - Material does not enter the airway    Treatment Dx and ICD 10: Dysphagia  Patient Position: Lateral and Patient Degrees: 90 degrees upright    Consistencies Administered: Dysphagia Pureed (Dysphagia I); Thin teaspoon;Reg solid; Thin straw; Thin cup;Nectar straw;Nectar  teaspoon    Compensatory Swallowing Strategies Attempted: Alternate solids and liquids;Upright as possible for all oral intake;Small bites/sips;Eat/Feed slowly; Remain upright for 30-45 minutes after meals;Swallow 2 times per bite/sip  Postural Changes and/or Swallow Maneuvers Trialed: Upright 90 degrees      Recommended Diet:  Solid consistency: Dysphagia Soft and Bite-Sized (Dysphagia III)  Liquid consistency: Thin  Liquid administration via: Cup;Straw    Medication administration: PO    Safe Swallow Protocol:  Supervision: Distant  Compensatory Swallowing Strategies: Alternate solids and liquids;Eat/Feed slowly;Upright as possible for all oral intake;Small bites/sips; Remain upright for 30-45 minutes after meals;Effortful swallow;Oral hygiene x2 daily    Recommendations/Treatment  Requires SLP Intervention: Yes        D/C Recommendations: To be determined  Postural Changes and/or Swallow Maneuvers: Upright 90 degrees      Recommended Exercises:   Therapeutic Interventions: Diet tolerance monitoring    Education: Images and recommendations were reviewed with the patient following this exam.   Patient Education: Educated pt to purpose of procedure, results, recommendations. Patient Education Response: Needs reinforcement;Verbalizes understanding    Prognosis  Prognosis for safe diet advancement: good  Duration/Frequency of Treatment  Duration/Frequency of Treatment: 1-3x/wk for LOS  Safety Devices  Safety Devices in place: Yes  Type of devices: All fall risk precautions in place (pt in radiology suite with staff, awaiting transport)    Goals:    Goal 1: The patient/caregiver will demonstrate understanding of recommendations for improved swallowing safety.   3/30: Provided education/training to patient re: basic swallow anatomy/function, compensatory/safety strategies (positioning, bolus size, rate of intake, alternation of textures, effortful swallow). Pt stated some comprehension, however suspect needs reinforcement. Cont goal  Goal 2: The patient will tolerate instrumental swallowing procedure  3/30: goal met, d/c goal    Oral Preparation / Oral Phase  Oral Phase: Impaired  Oral Phase - Major Contributing Deficits  Poor Mastication: Reg solid  Reduced Posterior Propulsion: Reg solid  Piecemeal Swallowing: Reg solid  Reduced Tongue Base Retraction: All    Pharyngeal Phase  Pharyngeal Phase: Impaired  Pharyngeal Phase - Major Contributing Deficits  Premature Spillage to Valleculae: All  Premature Spillage to Pyriform: All  Reduced Pharyngeal Peristalsis: All  Reduced Epiglottic Distention: All  Delayed Epiglottic Retraction: All  Reduced Laryngeal Elevation: All  Pooling Pyriform: All  Reduced Tongue Base: All  Pharyngeal Residue - Valleculae: All  Pharyngeal Residue - Pyriform: All  Pharyngeal Residue - Posterior Pharynx: All  Pharyngeal Wall - Weakness: All    Esophageal Phase  Esophageal Screen: WFL    Pain   Patient Currently in Pain: Denies  Pain Level: 0  Pain Type: Acute pain  Pain Location: Back      Therapy Time:   Individual Concurrent Group Co-treatment   Time In 1030         Time Out 1100         Minutes 30            Timed Code Treatment Minutes: 0 Minutes  Total Treatment Time: 30    Electronically Signed by:  Sandra Samuel M.A., Marta Orourke   Speech-Language Pathologist  Pager #[phone]    This document will serve as a discharge summary if pt discharges before next treatment.

## 2022-03-30 NOTE — PROGRESS NOTES
4 Eyes Admission Assessment     I agree as the admission nurse that 2 RN's have performed a thorough Head to Toe Skin Assessment on the patient. ALL assessment sites listed below have been assessed on admission. Areas assessed by both nurses: Braydenan Evener    [x]   Head, Face, and Ears   [x]   Shoulders, Back, and Chest  [x]   Arms, Elbows, and Hands   [x]   Coccyx, Sacrum, and Ischium  [x]   Legs, Feet, and Heels        Does the Patient have Skin Breakdown?   Yes a wound was noted on the Admission Assessment and an LDA was Initiated documentation include the Benita-wound, Wound Assessment, Measurements, Dressing Treatment, Drainage, and Color\",         Brian Prevention initiated:  Yes   Wound Care Orders initiated:  No      WOC nurse consulted for Pressure Injury (Stage 3,4, Unstageable, DTI, NWPT, and Complex wounds) or Brian score 18 or lower:  No      Nurse 1 eSignature: Electronically signed by Yonny Brandt RN on 3/30/22 at 7:51 AM EDT    **SHARE this note so that the co-signing nurse is able to place an eSignature**    Nurse 2 eSignature: Electronically signed by Joann Wilson RN on 3/30/22 at 7:54 AM EDT

## 2022-03-30 NOTE — CONSULTS
RADIATION ONCOLOGY CONSULTATION         Patient:  Wilmar Salazar  YOB: 1950    3/30/2022    REASON FOR CONSULT: Metastatic prostate cancer    PCP: Ira Berg MD    CHIEF COMPLAINT:     Chief Complaint   Patient presents with    Extremity Weakness    Fatigue    Groin Pain    Emesis    Diarrhea     HISTORY OF PRESENT ILLNESS:     HPI:  Oncology History    No history exists. Wilmar Salazar is a 66 y.o. male who was referred to me for by Dr Andrew Calderon for a Radiation Oncology consult regarding his [place] score 9, 5+4 prostate cancer with new onset weakness, dyspnea, lower back pain and anorexia. Patient was initially diagnosed with prostate cancer in January of this year with a PSA of 71.94. Biopsy noted neoplasm in all 12 cores with [place] score 5+4 = 9. Staging scans revealed indeterminate uptake in the left inferior pubic ramus and enlarged right external iliac lymph node. Patient was started on Lupron in January. Was seen in the ER in February for urinary outlet obstruction. He was catheterized temporarily and ultimately started on Flomax. He started with radiation therapy prostate and seminal vesicles and had received 6 fractions for a total of 1500 cGy total radiation when I was called to evaluate him. Per the patient, he had had diarrhea at the end of last week but none since Sunday. Patient was lethargic, reporting bilateral lower extremity weakness, abdominal/groin pain, dyspnea, hiccups and poor oral intake. Heart rate was 83 irregular apical with a heart murmur and blood pressure 74/38. Patient was taken to the emergency room for further evaluation. He was found to have acute kidney injury and a left ureteral kidney stone moderate left hydronephrosis. Patient also with thrombocytopenia of unclear etiology. He was admitted for further evaluation and treatment.   He continues to have significant bilateral lower extremity weakness and is reporting lower back pain not consistent with kidney stone. Radiation is currently on hold. Health history includes asthma and hypertension. Surgical history includes inguinal hernia repair and lipoma resection. Patient has a very strong family history of cancer including a sister with BRCA positive breast cancer, additional sister with ovarian cancer, his father had prostate cancer and a brother who had prostate and pancreatic cancer. Patient is a non-smoker who reports occasional alcohol use and denies illicit drug use. Patient is single and previous work history included working in [employer]. He is now retired. PAST MEDICAL HISTORY:     Past Medical History:   Diagnosis Date    Asthma     Environmental allergies     Inguinal hernia     Prostate cancer ([facility] [address]        PAST SURGICAL HISTORY:     Past Surgical History:   Procedure Laterality Date    INGUINAL HERNIA REPAIR Bilateral 6/20/2019    LAPAROSCOPIC BILATERAL INGUINAL HERNIA REPAIR WITH MESH performed by Abel Collet, MD at [address] [place] [address]        SOCIAL HISTORY:     Social History     Socioeconomic History    Marital status: Single     Spouse name: Not on file    Number of children: Not on file    Years of education: Not on file    Highest education level: Not on file   Occupational History    Not on file   Tobacco Use    Smoking status: Never Smoker    Smokeless tobacco: Never Used   Substance and Sexual Activity    Alcohol use: Yes     Comment: occ     Drug use: No    Sexual activity: Not on file   Other Topics Concern    Not on file   Social History Narrative    Not on file     Social Determinants of Health     Financial Resource Strain:     Difficulty of Paying Living Expenses: Not on file   Food Insecurity:     Worried About Running Out of Food in the Last Year: Not on file    Francisca of Food in the Last Year: Not on file   Transportation Needs:     Lack of Transportation (Medical):  Not on file    Lack of Transportation (Non-Medical): Not on file   Physical Activity:     Days of Exercise per Week: Not on file    Minutes of Exercise per Session: Not on file   Stress:     Feeling of Stress : Not on file   Social Connections:     Frequency of Communication with Friends and Family: Not on file    Frequency of Social Gatherings with Friends and Family: Not on file    Attends Yarsani Services: Not on file    Active Member of [address] [place] [address] or Organizations: Not on file    Attends Club or Organization Meetings: Not on file    Marital Status: Not on file   Intimate Partner Violence:     Fear of Current or Ex-Partner: Not on file    Emotionally Abused: Not on file    Physically Abused: Not on file    Sexually Abused: Not on file   Housing Stability:     Unable to Pay for Housing in the Last Year: Not on file    Number of [place] in the Last Year: Not on file    Unstable Housing in the Last Year: Not on file       FAMILY HISTORY:     Sister with BRCA positive breast cancer, additional sister with ovarian cancer, his father had prostate cancer and a brother who had prostate and pancreatic cancer. ALLERGIES:     Allergies as of 03/29/2022 - Fully Reviewed 03/29/2022   Allergen Reaction Noted    Other  11/08/2021       MEDICATIONS:     No current facility-administered medications on file prior to encounter. Current Outpatient Medications on File Prior to Encounter   Medication Sig Dispense Refill    acetaminophen (TYLENOL) 500 MG tablet Take 500 mg by mouth every 6 hours as needed for Pain      tamsulosin (FLOMAX) 0.4 MG capsule TAKE 1 CAPSULE BY MOUTH EVERY DAY 30 capsule 0    lisinopril-hydroCHLOROthiazide (PRINZIDE;ZESTORETIC) 20-12.5 MG per tablet Take 1 tablet by mouth daily 90 tablet 1       REVIEW OF SYSTEMS:       Constitutional:  No weight loss, No fever, No chills, No night sweats. Severe fatigue. ENT / Mouth:  No dysphagia, No hoarseness, No oral ulcers.   No sore throat, No tinnitus, Normal hearing. Cardiovascular:  No chest pain, No palpitations, No syncope, No upper extremity or lower extremity edema, No calf discomfort. Respiratory:  No cough. No hemoptysis, No wheezing, No dyspnea. Gastrointestinal:  Diarrhea towards the end of last week. Now has not had a bowel movement since Sunday. Urinary:  History of urinary outlet obstruction. Currently getting a Leblanc catheter placed. Musculoskeletal:  Lower back pain severe, 10/10. Skin:  No rash, No nodules, No pruritus, No lesions. Neurologic:  Patient lethargic. Only able to transfer with walker and significant assistance. Muscle strength 5/5 bilateral upper extremities. 3/5 bilateral lower extremities. Patient unable to lift his leg off the bed without assistance. No evidence of paresthesias. Psychiatric:  No depression, No anxiety, Concentration normal.    Endocrine:  No hot flashes, No thyroid symptoms. Hematologic:  No epistaxis, No petechiae, No ecchymosis. Lymphatic:  No lymphadenopathy, No lymphedema. PHYSICAL EXAM:       Vitals:    03/30/22 1529   BP: (!) 108/56   Pulse: 93   Resp: 24   Temp: 99.1 °F (37.3 °C)   SpO2: 95%       ECOG:Score 3:  Patient is capable of only limited self-care, confined to bed or chair greater than 50% of waking hours. General appearance: alert and cooperative  Head: Normocephalic, without obvious abnormality, atraumatic  Neck: Supple, No palpable lymphadenopathy in supraclavicular or cervical chains  Lungs: Breathing easily on room air  Heart: Regular rate and rhythm  Abdomen: Benign  Extremities: without cyanosis, clubbing, edema or asymmetry  Skin: No jaundice, purpura or petechiae  Neuorological: CN II-XII grossly intact. Muscle strength 5/5 bilateral upper extremities, 3/5 bilateral lower extremities.       LABS:     Lab Results   Component Value Date    WBC 5.9 03/30/2022    HGB 9.9 (L) 03/30/2022    HCT 29.2 (L) 03/30/2022    MCV 85.1 03/30/2022       Lab Results Component Value Date    GLUCOSE 121 (H) 03/30/2022     (HH) 03/30/2022    CREATININE 3.7 (H) 03/30/2022    K 3.3 (L) 03/30/2022       Lab Results   Component Value Date    ALKPHOS 223 (H) 03/30/2022    ALT 58 (H) 03/30/2022    AST 96 (H) 03/30/2022    BILITOT 1.7 (H) 03/30/2022    BILIDIR 1.2 (H) 03/30/2022       Lab Results   Component Value Date    PROTIME 11.8 03/29/2022     IMAGING:     CT ABDOMEN PELVIS WO CONTRAST Additional Contrast? None    Result Date: 3/29/2022  EXAM: CT ABDOMEN PELVIS WO CONTRAST INDICATION: hydronephrosis? AQUILINO COMPARISON: January 11, 2022 TECHNIQUE: Standard per department protocol without contrast. Up-to-date CT dose lowering techniques were utilized. FINDINGS: Trace left pleural effusion with mild left lower lobe airspace consolidation. Small pericardial effusion. The liver, gallbladder, pancreas, spleen, and adrenal glands are normal. 3 mm calculus at the left UVJ with moderate left hydronephrosis and hydroureter. There is left-sided perinephric stranding. No suspicious renal mass. No additional renal calculi. The bladder is normal. The prostate is mildly enlarged. The small and large bowel are normal in caliber without focal bowel wall thickening. Mild colonic diverticulosis is noted. The appendix is not definitely identified. Small hiatal hernia. No abnormality of the abdominal wall. No intra-abdominal fluid collections. Mild vascular calcifications. No aneurysm. No lymphadenopathy. No suspicious osseous lesions. 1.  Moderate left hydronephrosis with an obstructing 3 mm calculus at the left UVJ. 2.  Trace left pleural effusion with associated mild airspace consolidation which could represent atelectasis and/or pneumonia. US RENAL COMPLETE    Result Date: 3/29/2022  ULTRASOUND ABDOMEN LIMITED Ultrasound renal History : Abdominal pain. Elevated liver function tests. Hydronephrosis.  Comparison : 3/29/2022, CT of earlier the same day COMMENTS : The study is limited by overlying bowel gas. Liver : Heterogeneous hepatic echogenicity, mildly increased. No focal hepatic lesion. The portal and hepatic veins are patent. Gallbladder and Bile ducts : No cholelithiasis. Gallbladder polyps are noted, the largest of which measures 6 x 5 mm. Negative sonographic Carrion sign. No gallbladder wall thickening or pericholecystic fluid. The extrahepatic common bile duct measures 6 mm. Pancreas : Limited secondary to overlying bowel gas. Visualized portions are within normal limits. Kidneys: The right kidney measures 11.1 cm. The left kidney measures 13.4 cm. There is moderate left hydronephrosis. Unremarkable appearance of the urinary bladder. The visualized inferior vena cava and abdominal aorta are unremarkable. IMPRESSION : Gallbladder polyps, the largest of which measures 6 x 5 mm. One year follow up ultrasound recommended. Left hydronephrosis, as noted on the prior study. Mildly heterogeneous increased hepatic echogenicity. Findings may reflect mild steatosis. XR CHEST PORTABLE    Result Date: 3/29/2022  Chest AP portable at 1118 INDICATIONS: Hypertension Comparison with 1/20/2022 Trachea is midline Aorta is mildly tortuous The heart is at the upper limits of normal in size Apical calcified granulomatous residuals are of decreased conspicuity on the current radiograph No active airspace disease or effusion No congestive changes No osseous destructive lesions     1. No interval changes. US ABDOMEN LIMITED    Result Date: 3/29/2022  ULTRASOUND ABDOMEN LIMITED Ultrasound renal History : Abdominal pain. Elevated liver function tests. Hydronephrosis. Comparison : 3/29/2022, CT of earlier the same day COMMENTS : The study is limited by overlying bowel gas. Liver : Heterogeneous hepatic echogenicity, mildly increased. No focal hepatic lesion. The portal and hepatic veins are patent. Gallbladder and Bile ducts : No cholelithiasis.  Gallbladder polyps are noted, the largest of which measures 6 x 5 mm. Negative sonographic Carrion sign. No gallbladder wall thickening or pericholecystic fluid. The extrahepatic common bile duct measures 6 mm. Pancreas : Limited secondary to overlying bowel gas. Visualized portions are within normal limits. Kidneys: The right kidney measures 11.1 cm. The left kidney measures 13.4 cm. There is moderate left hydronephrosis. Unremarkable appearance of the urinary bladder. The visualized inferior vena cava and abdominal aorta are unremarkable. IMPRESSION : Gallbladder polyps, the largest of which measures 6 x 5 mm. One year follow up ultrasound recommended. Left hydronephrosis, as noted on the prior study. Mildly heterogeneous increased hepatic echogenicity. Findings may reflect mild steatosis. POC US [facility]    Result Date: 3/29/2022  POCUS_Cardiac:     Exam Information:          Exam type:  Clinically indicated     Indication(s) for Exam:          The exam was performed with the following indications[de-identified]  Hypotension     Views Obtained & Images Saved for These Views: The pericardial sac, myocardium, 4 chambers, and IVC were identified using the following views[de-identified]  Subxiphoid, Parasternal short-axis, Apical 4-chamber, IVC view     Findings:          Pericardial Effusion:  No pericardial effusion         IVC collapsibility:  High collapsibility (>50%)     Interpretation:          Other[de-identified]  no effusion     Confirmatory study:          What confirmatory study was done?:  Not applicable  Electronically signed by Donovan Ngo on Tuesday, March 29, 2022 at 3:44 PM    FL MODIFIED BARIUM SWALLOW W VIDEO    Result Date: 3/30/2022  EXAM: MODIFIED BARIUM SWALLOW INDICATION: Dysphagia COMPARISON: None TECHNIQUE: Multiple consistencies of barium contrast, consisting of puree, thin, nectar and cracker, were administered to the patient under the direction of the speech pathologist. Lateral fluoroscopy of the neck was performed during swallowing.  Total number of images: 11 Fluoroscopy time: 1.4 minutes FINDINGS: Oral phase of swallowing is normal.  No laryngeal penetration or aspiration is demonstrated. 1. No laryngeal penetration or aspiration. CT the abdomen pelvis personally viewed by Dr. Simin Sargent and myself and compared with previous scans. STAGING:     Ghassan score 9, 5 +4 prostate cancer with possible metastasis to the bone and iliac lymph node    ASSESSMENT:     Patient with multiple health issues of unclear etiology. Currently in acute kidney injury. Unclear if this is all due to dehydration from previous diarrhea or if urinary outlet obstruction also contributing. Patient with thrombocytopenia of unclear etiology. Unclear if this is related to disease in the bone marrow. Patient having bone marrow biopsy tomorrow to evaluate further. He has not had enough radiation to have caused a significant response this quickly. Patient with lower back pain and bilateral lower extremity weakness, 3/5. Patient with full strength bilateral upper extremities. Weakness could be due to decompensation however it would be odd that his upper extremities remain at full strength and his lower extremities have weakened so significantly since Friday. Personally reviewed CT of the abdomen pelvis with Dr. Simin Sargent. Do not see evidence of metastasis or spinal cord compression however MRI of the lumbar spine may be more helpful if there is a possibility of intraluminal lesion, bleed, or other paraneoplastic process. Patient also complaining of pain in the lumbar spine of unclear etiology at this time. Does not correlate with patient's ureteral stone.         PLAN:      Orders Placed This Encounter   Procedures    Culture, Blood 1    Culture, Blood 2    Culture, Urine    COVID-19, Rapid    Culture, Respiratory    Culture, Urine    XR CHEST PORTABLE    CT ABDOMEN PELVIS WO CONTRAST Additional Contrast? None    US ABDOMEN LIMITED    US RENAL COMPLETE  POC US ECHOCARDIO TRANSTHORACIC LTD    FL MODIFIED BARIUM SWALLOW W VIDEO    MRI LUMBAR SPINE W WO CONTRAST    CBC with Auto Differential    Comprehensive Metabolic Panel w/ Reflex to MG    Lipase    Troponin    Brain Natriuretic Peptide    Protime-INR    Lactic Acid    Blood Gas, Venous    Urinalysis with Reflex to Culture    CBC with Auto Differential    Path Review, Smear    Sodium, urine, random    Creatinine, urine, random    Osmolality, urine    Basic Metabolic Panel    Hepatic Function Panel    CBC    Microscopic Urinalysis    Protein / creatinine ratio, urine    Protein / creatinine ratio, urine    Osmolality    Magnesium    Lactic Acid    Lactate Dehydrogenase    Iron and TIBC    Fibrinogen    Ferritin    Haptoglobin    Procalcitonin    Reticulocytes    APTT    Hepatitis Panel, Acute    CBC    CBC with Auto Differential    CBC with Auto Differential    Procalcitonin    Uric Acid    ADULT DIET; Dysphagia - Soft and Bite Sized    Discontinue indwelling urinary catheter when documented indications no longer apply per hospital policy    Vital signs per unit routine    Telemetry monitoring - continuous duration    Strict intake and output    Notify physician    Up with assistance    Daily weights    Straight cath    Elevate Head of Bed     Orthostatic blood pressure and pulse    Place sequential compression device    Verify hospital blood product consent form has been signed and witnessed    Vital Signs For Blood Product Transfusion    Transfusion Reaction Management    Turn or assist with turn approximately every 2 hours if patient is unable to turn self. Remind patient to turn if necessary.     Assess skin per unit guidelines    Pad/offload medical devices    Maintain HOB at the lowest elevation consistent with medical plan of care    Use lift equipment for lifting patient    Maintain heels off of bed at all times    Bladder scan    Bladder scan  Insert indwelling urinary catheter    Discontinue indwelling urinary catheter when documented indications no longer apply per hospital policy    Schedule appointment    Verify informed consent and begini Procedure Verfication Checklist/Universal Protocol form    Notify service    Please have procedure tray at bedside with 2 large clear Chloroprep    Full Code    Inpatient consult to Hospitalist    Inpatient consult to Urology    Inpatient consult to Oncology    Inpatient consult to Nephrology    Inpatient consult to Case Management    Inpatient consult to Dietitian    Inpatient consult to Hematology    OT eval and treat    PT eval and treat    Initiate Oxygen Therapy Protocol    SLP eval and treat    POC Glucose    EKG 12 Lead    TYPE AND SCREEN    PREPARE PLATELETS, 1 Product    ADMIT TO INPATIENT    Fall precautions     Radiation on hold until clear clinical picture of the etiology of patient's multiple health issues. MRI of the lumbar spine to rule out cord compression fromintraluminal lesion, bleed, or other paraneoplastic process. Agree with bone marrow biopsy to evaluate for metastasis in the bone marrow contributing to thrombocytopenia. Please note this document has been produced using speech recognition software and may contain errors related to that system including errors in grammar, punctuation, and spelling, as well as words and phrases that may be inappropriate. If there are any questions or concerns please feel free to contact the dictating provider for clarification. A total of 60 minutes was spent on today's patient encounter.   If applicable, non-patient-facing activities:     ( x  )   Preparing to see the patient and reviewing records     ( x  )   Individual interpretation of results      (   )   Discussion or coordination of care with other health care professionals     (  x )   Ordering of unique tests, medications, or procedures     ( x  )   Documentation within the [facility]     ENID Colon CNP     This visit was completed in consultation with Dr. Simin Sargent

## 2022-03-30 NOTE — PROGRESS NOTES
4 Eyes Admission Assessment     I agree as the admission nurse that 2 RN's have performed a thorough Head to Toe Skin Assessment on the patient. ALL assessment sites listed below have been assessed on admission. Areas assessed by both nurses Natalie & Pablo Pollack   : *[x]   Head, Face, and Ears   [x]   Shoulders, Back, and Chest  [x]   Arms, Elbows, and Hands   [x]   Coccyx, Sacrum, and Ischium  [x]   Legs, Feet, and Heels        Does the Patient have Skin Breakdown?   No         Brian Prevention initiated:  No   Wound Care Orders initiated:  No      [facility] nurse consulted for Pressure Injury (Stage 3,4, Unstageable, DTI, NWPT, and Complex wounds) or Brian score 18 or lower:  No      Nurse 1 eSignature: Electronically signed by Javan Thomas RN on 3/30/22 at 7:52 PM EDT    **SHARE this note so that the co-signing nurse is able to place an eSignature**    Nurse 2 eSignature: Electronically signed by Zak Castle RN on 3/30/22 at 9:07 PM EDT

## 2022-03-30 NOTE — PLAN OF CARE
Problem: Falls - Risk of:  Goal: Will remain free from falls  Description: Will remain free from falls  3/30/2022 1027 by Je Lance RN  Outcome: Ongoing   Call light within reach, floor clear, bed/chair alarm on. Problem: Safety:  Goal: Ability to chew and swallow food without choking will improve  Description: Ability to chew and swallow food without choking will improve  3/30/2022 1027 by Je Lance RN  Outcome: Ongoing   SLP consult placed, barium swallow study in progress. Problem: Skin Integrity:  Goal: Will show no infection signs and symptoms  Description: Will show no infection signs and symptoms  3/30/2022 1027 by Je Lance RN  Outcome: Ongoing   No new s/s of infection at this time, will continue to monitor.

## 2022-03-30 NOTE — PROGRESS NOTES
Pt failed bedside swallow screen. Pt given small amount of water, however pt was unable to drink it without stopping. Pt did begin to get short of breath and appeared to gag and cough after each swallow. Pt stated that swallowing felt \"really funny\". Pt also stated that he feels that he has had a harder time swallowing in the last couple days. Dr. Tyrone Riggins on resident team notified, orders for strict NPO and speech eval placed. Will continue to monitor.

## 2022-03-30 NOTE — CONSULTS
[facility]   Consult Note  Love Wong    :  [birth year]    MRN:  [id number]    Reason for Consult:  Anemia, thrombocytopenia    Requesting Physician:  Dr. Concepcion    Chief complaint:   Hypotension, diarrhea fatigue      History Obtained From:  chart review and the patient. HPI: Love Wong is a 66 y.o.  male patient, who was seen at the request of Dr. Raissa Conte MD.    Patient has a PMH as below notable for prostate cancer with pelvic metastases, asthma, urinary retention, inguinal hernia repair who presented with hypotension diarrhea and fatigue. Patient had the first session of radiation therapy on 3/21 and received 1 dose of hormonal therapy. Patient is not on chemo and has no plans for surgery. Patient endorsed some burning and discomfort with urination. Patient reports a week ago he was more stronger oregano he substantially more weak. Patient was found hypotensive in his last appointment which improved after 1 L bolus of fluids. Patient reported that since last weekend he has been having nonbloody diarrhea, intermittent cramping abdominal pain, generalized weakness, and nausea vomiting which resolved on . After this episodes family and patient report increased weakness and decreased p.o. intake due to general malaise and nausea and vomiting. The patient denies abdominal or flank pain, anorexia, nausea or vomiting, dysphagia, change in bowel habits or black or bloody stools or weight loss. Patient denies any exertional chest pain, dyspnea, palpitations, syncope, orthopnea, edema or paroxysmal nocturnal dyspnea. The patient denies  frequency or hematuria. He denies constitutional symptoms of fatigue, weakness, weight loss or gain, fevers, night sweats. On admission patient was hypotensive and afebrile. Sodium 121, creatinine 4.9, anion gap 21, BNP elevated 7944, , platelets 11. No acute changes were found on a chest x-ray. Hematology was consulted for further work-up and management of anemia thrombocytopenia          HISTORY OF PRESENT ILLNESS:    Past Medical History:     has a past medical history of Asthma, Environmental allergies, Inguinal hernia, and Prostate cancer ([address] Past Surgical History:    Past Surgical History:   Procedure Laterality Date    INGUINAL HERNIA REPAIR Bilateral 6/20/2019    LAPAROSCOPIC BILATERAL INGUINAL HERNIA REPAIR WITH MESH performed by Adria Vela MD at [facility]         Current Medications:     lidocaine  1 patch TransDERmal Once    tamsulosin  0.4 mg Oral Daily    sodium chloride flush  5-40 mL IntraVENous 2 times per day    lidocaine  1 patch TransDERmal Daily    cefTRIAXone (ROCEPHIN) IV  1,000 mg IntraVENous Q24H     Allergies: Allergies   Allergen Reactions    Other      dust,,animal dander,pollen      Social History:    reports that he has never smoked. He has never used smokeless tobacco. He reports current alcohol use. He reports that he does not use drugs. Family History:     family history is not on file. ROS  Review of Systems   Constitutional: Positive for activity change, appetite change and fatigue. Negative for chills, diaphoresis, fever and unexpected weight change. HENT: Negative for trouble swallowing. Eyes: Negative for redness and visual disturbance. Respiratory: Negative for cough, choking, chest tightness and shortness of breath. Cardiovascular: Negative for chest pain and palpitations. Gastrointestinal: Positive for abdominal distention, diarrhea, nausea and vomiting. Negative for abdominal pain and constipation. Endocrine: Negative for polydipsia and polyphagia. Genitourinary: Positive for difficulty urinating. Negative for dysuria, flank pain and hematuria. Musculoskeletal: Negative for arthralgias, myalgias and neck stiffness. Skin: Negative for rash and wound.    Allergic/Immunologic: Negative for environmental allergies and food allergies. Neurological: Positive for weakness and light-headedness. Negative for dizziness, seizures, syncope, facial asymmetry, numbness and headaches. ROS: A 10 point review of systems was conducted, significant findings as noted in HPI. Physical Exam:   BP (!) 112/59   Pulse 98   Temp 97.8 °F (36.6 °C) (Oral)   Resp 22   Ht 5' 6\" (1.676 m)   Wt 189 lb 4 oz (85.8 kg)   SpO2 94%   BMI 30.55 kg/m²      Physical Exam  Constitutional:       Appearance: Normal appearance. He is ill-appearing. HENT:      Head: Normocephalic. Mouth/Throat:      Mouth: Mucous membranes are moist.   Eyes:      Extraocular Movements: Extraocular movements intact. Conjunctiva/sclera: Conjunctivae normal.      Pupils: Pupils are equal, round, and reactive to light. Cardiovascular:      Rate and Rhythm: Normal rate and regular rhythm. Pulses: Normal pulses. Heart sounds: Normal heart sounds. Pulmonary:      Effort: Pulmonary effort is normal.      Breath sounds: Normal breath sounds. Abdominal:      General: Abdomen is flat. Bowel sounds are normal. There is distension. Palpations: Abdomen is soft. Musculoskeletal:         General: Normal range of motion. Cervical back: Normal range of motion. Skin:     General: Skin is dry. Neurological:      General: No focal deficit present. Mental Status: He is alert and oriented to person, place, and time. Mental status is at baseline. Motor: Weakness (Weakness most prominent in the b/l LE 3/5. Full strength in b/l UE. Difficulty leaning forward.  ) present.        DATA:  CBC:   Recent Labs     03/29/22 2058 03/30/22  0020 03/30/22  0320   WBC 5.9 7.6 5.9   HGB 10.6* 10.0* 9.9*   HCT 31.6* 29.2* 29.2*   MCV 85.8 85.4 85.1   PLT 7* 36* 24*     BMP:   Recent Labs     03/29/22  1135 03/30/22  0320   * 127*   K 3.6 3.3*   CL 85* 92*   CO2 15* 19*   * 124*   CREATININE 4.9* 3.7*     LIVER PROFILE:   Recent Labs     03/29/22  1135 03/30/22  0320   * 96*   ALT 78* 58*   LIPASE 28.0  --    BILIDIR  --  1.2*   BILITOT 1.3* 1.7*   ALKPHOS 226* 223*     PT/INR:    Lab Results   Component Value Date    PROTIME 11.8 03/29/2022    PROTIME 11.0 06/20/2019    INR 1.04 03/29/2022    INR 0.96 06/20/2019     PTT:    Lab Results   Component Value Date    APTT 30.7 03/29/2022    APTT 32.2 06/20/2019     UA:  Recent Labs     03/29/22  1453   COLORU Yellow   PHUR 6.0   WBCUA 3-5   RBCUA 0-2   BACTERIA 2+*   CLARITYU Clear   SPECGRAV 1.020   LEUKOCYTESUR SMALL*   UROBILINOGEN 0.2   BILIRUBINUR SMALL*   BLOODU MODERATE*   GLUCOSEU Negative     Magnesium:    Lab Results   Component Value Date    MG 2.30 03/30/2022    MG 2.20 08/27/2019     PEDRO LUIS:  No results found for: ANATITER, PEDRO LUIS  Uric Acid:  No components found for: URIC  Reticulocyte Count:    Lab Results   Component Value Date    IRF 0.22 03/29/2022     CRP:  No components found for: CPR  Rosario/Direct Antibody Test:  No components found for: [address], [facility]    IMPRESSION/RECOMMENDATIONS:      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 Platelets went down to 7, received 1 unit of platelets, currently platelet count 24. INR 1.04, fibrinogen 856, PTT 30. Due to diarrhea and AQUILINO there is suspicion of TTP Blood smear evaluated with Hematology pathologist and there is some concern of dysplasia   -Continue supportive care  -Monitor CBC, transfuse if platelets <94  -Follow-up LDH, haptoglobin, reticulocyte count.  -Bone marrow biopsy for tomorrow  -Continue holding anticoagulation    Normocytic anemia with concern of AML  Hemoglobin on February 13 0.6. On presentation around 10. MCV 85.   RDW 16.  Reticulocyte count with adequate bone marrow response.   -Continue to monitor CBC  -Bone marrow biopsy for tomorrow  -Follow-up LDH, haptoglobin, reticulocyte count  -Will check uric acid to rule out tumor lysis syndrome      Thank you for the opportunity to participate in [facility]. Please do not hesitate to contact me for questions or concerns regarding this patient's evaluation.       W/D/W/A Dr. Triston Leiva, PGY1  3/30/2022  7:31 AM

## 2022-03-30 NOTE — PROGRESS NOTES
Progress Note    Admit Date: 3/29/2022  Day: 2  Diet: Diet NPO    CC: Weakness, Hypotension    Interval history:   Patient had platelets of 7, s/p 1 unit platelet transfusion improved to 36 then down to 24. 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 Hx of retention 2/2 prostate cancer  Patient denies fevers, chills, SOB, chest or abdominal pain, N/V, diarrhea. HPI:   Chinmay Carey is a 66year old Male with a PmHx of prostate cancer with metastasis to the pelvis, asthma, lipoma resection, urinary retention, hernia repair who present to the ED with a chief complaint of hypotension, diarrhea, fatigue. Currently undergoing radiation therapy, but no chemotherapy for prostate cancer and at his last appointment a few days ago he was hypotensive to 70's/40's which improved after a 1L bolus. Last weekend he endorsed non-bloody diarrhea, intermittent cramping abdominal pain, generalized weakness, and N/V which resolved by Sunday. Family endorses increased weakness and decreased PO intake due to general malaise and N/V. He has increasing weakness for the last few weeks but was still able to walk and drive before the weekend, but has worsened since. He just started radiation therapy on 3/21 and has received one dose of hormonal therapy. Not on chemo and no plans for surgery. Does endorse some burning and discomfort upon urination.  He was able a weak ago to drive himself home and now he is substantially more weak.      He denies fever, chills, chest pain, shortness of breath, dysuria, hematuria, melena, hematochezia, numbness, unilateral weakness, lower extremity weakness, bowel or bladder incontinence.     In the ED, he is hyponatremic to 121, creatinine 4.9, anion gap of 21, LA 1.2, BNP elevated to 7944 (last echo 11/2021 w/ EF 50% and indeterminate diastolic), , platelets 11, VBG 5.58/65.8/76. No acute changes to chest XR. Medications:     Scheduled Meds:   lidocaine  1 patch TransDERmal Once    potassium chloride  10 mEq IntraVENous Q1H    tamsulosin  0.4 mg Oral Daily    sodium chloride flush  5-40 mL IntraVENous 2 times per day    lidocaine  1 patch TransDERmal Daily    cefTRIAXone (ROCEPHIN) IV  1,000 mg IntraVENous Q24H     Continuous Infusions:   sodium chloride      IV infusion builder 100 mL/hr at 03/30/22 0551    sodium chloride       PRN Meds:sodium chloride flush, sodium chloride, ondansetron **OR** ondansetron, polyethylene glycol, acetaminophen **OR** acetaminophen, sodium chloride    Objective:   Vitals:   T-max:  Patient Vitals for the past 8 hrs:   BP Temp Temp src Pulse Resp SpO2   03/30/22 0800 (!) 111/58 98.1 °F (36.7 °C) Oral 106 28 95 %   03/30/22 0313 (!) 112/59 97.8 °F (36.6 °C) Oral 98 22 94 %       Intake/Output Summary (Last 24 hours) at 3/30/2022 0932  Last data filed at 3/30/2022 5079  Gross per 24 hour   Intake 4730.62 ml   Output 1250 ml   Net 3480.62 ml       Review of Systems  A full 10 point ROS was performed with pertinent + and - listed above    Physical Exam  Constitutional:       General: He is not in acute distress. Appearance: He is ill-appearing. HENT:      Head: Normocephalic. Right Ear: External ear normal.      Left Ear: External ear normal.      Nose: Nose normal.     Pharynx: Oropharynx is clear. No oropharyngeal exudate. Eyes:      Extraocular Movements: Extraocular movements intact. Conjunctiva/sclera: Conjunctivae normal.      Pupils: Pupils are equal, round, and reactive to light. Cardiovascular:      Rate and Rhythm: Normal rate and regular rhythm. Pulses: Normal pulses. Heart sounds: No friction rub. No gallop. Pulmonary:      Effort: Pulmonary effort is normal. No respiratory distress. Breath sounds: No stridor.  No wheezing, rhonchi. Slight crackles at lung bases with decreased breath sounds. Abdominal:      General: There is distension. Palpations: Abdomen is soft. Tenderness: There is no abdominal tenderness. There is no right CVA tenderness, left CVA tenderness, guarding or rebound. Musculoskeletal:         General: No deformity. Now has bilateral LE swelling, 2+ pitting edema  Skin:     General: Skin is warm. Coloration: Skin is not jaundiced. Findings: No bruising, erythema or lesion. Neurological:      Mental Status: He is alert and oriented to person and time but not place     Cranial Nerves: No cranial nerve deficit. Motor: Weakness present. Comments: Weakness most prominent in the b/l LE 3/5. Full strength in b/l UE. Difficulty leaning forward. LABS:    CBC:   Recent Labs     03/29/22 2058 03/30/22  0020 03/30/22  0320   WBC 5.9 7.6 5.9   HGB 10.6* 10.0* 9.9*   HCT 31.6* 29.2* 29.2*   PLT 7* 36* 24*   MCV 85.8 85.4 85.1     Renal:    Recent Labs     03/29/22  1135 03/30/22  0320   * 127*   K 3.6 3.3*   CL 85* 92*   CO2 15* 19*   * 124*   CREATININE 4.9* 3.7*   GLUCOSE 145* 121*   CALCIUM 8.1* 8.0*   MG  --  2.30   ANIONGAP 21* 16     Hepatic:   Recent Labs     03/29/22  1135 03/30/22  0320   * 96*   ALT 78* 58*   BILITOT 1.3* 1.7*   BILIDIR  --  1.2*   PROT 5.7* 5.2*   LABALBU 2.5* 2.3*   ALKPHOS 226* 223*     Troponin:   Recent Labs     03/29/22  1135   TROPONINI <0.01     BNP: No results for input(s): BNP in the last 72 hours. Lipids: No results for input(s): CHOL, HDL in the last 72 hours. Invalid input(s): LDLCALCU, TRIGLYCERIDE  ABGs:  No results for input(s): PHART, UFL0CAE, PO2ART, JPG9YSH, BEART, THGBART, P7NHEHPI, WDF1PJO in the last 72 hours.     INR:   Recent Labs     03/29/22  1135   INR 1.04     Lactate: No results for input(s): LACTATE in the last 72 hours.  Cultures:  -----------------------------------------------------------------  RAD:   US ABDOMEN LIMITED   Final Result   IMPRESSION :      Gallbladder polyps, the largest of which measures 6 x 5 mm. One year follow up ultrasound recommended. Left hydronephrosis, as noted on the prior study. Mildly heterogeneous increased hepatic echogenicity. Findings may reflect mild steatosis. US RENAL COMPLETE   Final Result   IMPRESSION :      Gallbladder polyps, the largest of which measures 6 x 5 mm. One year follow up ultrasound recommended. Left hydronephrosis, as noted on the prior study. Mildly heterogeneous increased hepatic echogenicity. Findings may reflect mild steatosis. CT ABDOMEN PELVIS WO CONTRAST Additional Contrast? None   Final Result      1. Moderate left hydronephrosis with an obstructing 3 mm calculus at the left UVJ. 2.  Trace left pleural effusion with associated mild airspace consolidation which could represent atelectasis and/or pneumonia. POC US [facility]   Final Result      XR CHEST PORTABLE   Final Result   1. No interval changes. FL MODIFIED BARIUM SWALLOW W VIDEO    (Results Pending)       Assessment/Plan:     Zita Homans is a 66year old Male with a PmHx of prostate cancer with metastasis to the pelvis, asthma, lipoma resection, hernia repair who present to the ED with a chief complaint of hypotension, diarrhea, fatigue.      Hypovolemic hyponatremia likely 2/2 viral gastroenteritis and poor PO intake  Hypotension  Patient is acutely dehydrated and hypotensive, responding to IVF. Diarrhea and N/V over the weekend has since resolved and patient endorse poor PO intake since then.   - Nephrology consulted, appreciate recs  - Blood culture x2, respiratory, urine culture to rule out sepsis  - Bicarb gtt  - Urine studies showed low urine sodium, urine osmol 397 which is mildly low  - Dietician consulted due to poor PO intake  - Follow BMP and mag  - PT/OT  - SLP w/ MBS     AQUILINO  Hx of Urinary retention  Likely 2/2 poor PO intake, N/V, and diarrhea. However he has a hx of urinary retention so it might also be due to obstruction. 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 Likely acute renal failure causing elevated BMP. - Urology consult, appreciate recs  - CT abdomen without contrast showed L renal stone 3mm, obstructing with hydronephrosis  - Bladder scan qShift  - Leblanc catheter if needed  - Urinalysis with reflex  - Urine culture  - Urine studies  - Home flomax     Suspected UTI vs. pneumonia  Urinalysis showed 2+ WBC with burining upon urination and hx of retention. Did not meets sepsis criteria. CT chest showed L pleural effusion, possible atelectasis or pneumonia. Did fail swallow eval. Afebrile, normal WBC. - Started ceftriaxone (3/29 - ), broadened to Zosyn and Zyvox (3/30 - )  - procal >100  - Urine, blood, and respiratory culture pending  - May consult ID in the context of radiation tx     Prostate cancer  Started radiation therapy on 3/21. S/p 1 round of hormonal therapy. Not undergoing chemotherapy or plans for surgery  - Consulted urology, appreciate recs  - Consulted oncology, Dr. Annie Modi, appreciate recs  - Monitor BMP  - Monitor for signs of retention     Thrombocytopenia 2/2 radiation therapy  Platelets 77G today, baseline less than a month ago >400k s/p radiation therapy. Need to rule out TTP/HUS. Concern for sepsis as well.   - Hematology and oncology consulted, appreciate recs  - CBC daily  - Transfuse platelets <55  - Path review, smear  - LDH, haptoglobin, reticulocyte count  - PT/INR  - PTT and fibrinogen  - Holding anticoag     Anion gap metabolic acidosis  No clear cause with initial LA normal. Respiratory compensation.  - Denies alcohol use  - Anion gap closed to 16 on recheck  - on bicarb gtt, can likely transition to NS tomorrow or per nephro recs     Transaminitis  Recent diarrhea and N/V with elevated levels could warrant a hepatitis work-up.   - Abdominal US showed mild steatosis and GB polyps  - Trend liver panels: improving     Chronic back pain  - Fentanyl patch      Code Status: Full code  FEN: NPO, SLP pending  PPX:SCD  DISPO: Facundo Bedolla MD, PGY-1  03/30/22  9:32 AM    This patient has been staffed and discussed with Barbie Tavarez MD.

## 2022-03-30 NOTE — PROGRESS NOTES
Resident Physician Progress Note Addendum    I have seen, examined and evaluated the patient with the resident physician Dr. Betina Campa under my direct supervision. I have reviewed the resident physician's note and agree with the assessment and plan of care as documented with the following addendum. Pt. Is a 67 yo M with metastatic prostate CA, urinary retention requiring catheterization, undergoing radiation therapy who presented with hypotension and hyponatremia. PE:Genl-NAD  CVS-RRR  Resp-CTAB    A/P:  Hypovolemic hyponatremia, 2/2 viral GE, poor intake  H/O urinary retention  AQUILINO  UTI ? pneumonia  Metastatic prostate CA  Thrombocytopenia  Transaminitis, improving    -cont. Zosyn,zyvox  -sodium improving, cont. Monitoring electrolytes  -appreciate nephro, hem/onc and uro consults.   -transfused platelets, cont. To monitor    dispo - 3-5 days, pending further hospital course.     [address] MD Anmol  Hospitalist

## 2022-03-30 NOTE — PLAN OF CARE
Problem: Falls - Risk of:  Goal: Will remain free from falls  Description: Will remain free from falls  Outcome: Ongoing  Note:   Pt is a High fall risk. See Navdeep Quant Fall Score and ABCDS Injury Risk assessments.   + Screening for Orthostasis and/or + High Fall Risk per ZHANG/ABCDS: Explained fall risk precautions to pt and family and rationale behind their use to keep the patient safe. Pt bed is in low position, side rails up, call light and belongings are in reach. Fall wristband applied and present on pts wrist.  Bed alarm on. Pt encouraged to call for assistance. Will continue with hourly rounds for PO intake, pain needs, toileting and repositioning as needed. Problem: Skin Integrity:  Goal: Absence of new skin breakdown  Description: Absence of new skin breakdown  Outcome: Ongoing  Note: Pt not noted to have any additional skin breakdown from initial assessment. Pt existing skin breakdown noted in flowsheets. Pt able to turn self, however needs to be turned every 2 hours while sleeping. Brian orders have been initiated. Problem: Safety:  Goal: Ability to chew and swallow food without choking will improve  Description: Ability to chew and swallow food without choking will improve  Note: Pt failed bedside swallow eval d/t shortness of breath, coughing and gagging on water. Pt placed on strict NPO. Orders for speech evaluation have been placed per resident. Will continue to monitor.

## 2022-03-31 ENCOUNTER — APPOINTMENT (OUTPATIENT)
Dept: ULTRASOUND IMAGING | Age: 72
DRG: 682 | End: 2022-03-31
Payer: MEDICARE

## 2022-03-31 LAB
ACANTHOCYTES: ABNORMAL
ALBUMIN SERPL-MCNC: 1.8 G/DL (ref 3.4–5)
ALP BLD-CCNC: 273 U/L (ref 40–129)
ALT SERPL-CCNC: 71 U/L (ref 10–40)
ANION GAP SERPL CALCULATED.3IONS-SCNC: 12 MMOL/L (ref 3–16)
ANISOCYTOSIS: ABNORMAL
ANISOCYTOSIS: ABNORMAL
AST SERPL-CCNC: 135 U/L (ref 15–37)
BANDED NEUTROPHILS RELATIVE PERCENT: 1 % (ref 0–7)
BANDED NEUTROPHILS RELATIVE PERCENT: 4 % (ref 0–7)
BASOPHILS ABSOLUTE: 0 K/UL (ref 0–0.2)
BASOPHILS ABSOLUTE: 0 K/UL (ref 0–0.2)
BASOPHILS RELATIVE PERCENT: 0 %
BASOPHILS RELATIVE PERCENT: 0 %
BILIRUB SERPL-MCNC: 1.8 MG/DL (ref 0–1)
BILIRUBIN DIRECT: 1.4 MG/DL (ref 0–0.3)
BILIRUBIN, INDIRECT: 0.4 MG/DL (ref 0–1)
BLOOD BANK DISPENSE STATUS: NORMAL
BLOOD BANK PRODUCT CODE: NORMAL
BPU ID: NORMAL
BUN BLDV-MCNC: 119 MG/DL (ref 7–20)
CALCIUM IONIZED: 1.05 MMOL/L (ref 1.12–1.32)
CALCIUM SERPL-MCNC: 7.7 MG/DL (ref 8.3–10.6)
CHLORIDE BLD-SCNC: 91 MMOL/L (ref 99–110)
CO2: 25 MMOL/L (ref 21–32)
CREAT SERPL-MCNC: 2.7 MG/DL (ref 0.8–1.3)
DESCRIPTION BLOOD BANK: NORMAL
DOHLE BODIES: PRESENT
EOSINOPHILS ABSOLUTE: 0 K/UL (ref 0–0.6)
EOSINOPHILS ABSOLUTE: 0.1 K/UL (ref 0–0.6)
EOSINOPHILS RELATIVE PERCENT: 0 %
EOSINOPHILS RELATIVE PERCENT: 1 %
GFR AFRICAN AMERICAN: 28
GFR NON-AFRICAN AMERICAN: 23
GLUCOSE BLD-MCNC: 141 MG/DL (ref 70–99)
HCT VFR BLD CALC: 25.1 % (ref 40.5–52.5)
HCT VFR BLD CALC: 25.1 % (ref 40.5–52.5)
HEMOGLOBIN: 8.4 G/DL (ref 13.5–17.5)
HEMOGLOBIN: 8.6 G/DL (ref 13.5–17.5)
HYPERCHROMASIA: ABNORMAL
HYPOCHROMIA: ABNORMAL
HYPOCHROMIA: ABNORMAL
LYMPHOCYTES ABSOLUTE: 0.2 K/UL (ref 1–5.1)
LYMPHOCYTES ABSOLUTE: 0.4 K/UL (ref 1–5.1)
LYMPHOCYTES RELATIVE PERCENT: 2 %
LYMPHOCYTES RELATIVE PERCENT: 5 %
MACROCYTES: ABNORMAL
MACROCYTES: ABNORMAL
MAGNESIUM: 2.3 MG/DL (ref 1.8–2.4)
MCH RBC QN AUTO: 28.7 PG (ref 26–34)
MCH RBC QN AUTO: 29.6 PG (ref 26–34)
MCHC RBC AUTO-ENTMCNC: 33.6 G/DL (ref 31–36)
MCHC RBC AUTO-ENTMCNC: 34.3 G/DL (ref 31–36)
MCV RBC AUTO: 85.3 FL (ref 80–100)
MCV RBC AUTO: 86.2 FL (ref 80–100)
METAMYELOCYTES RELATIVE PERCENT: 1 %
METAMYELOCYTES RELATIVE PERCENT: 1 %
MICROCYTES: ABNORMAL
MICROCYTES: ABNORMAL
MONOCYTES ABSOLUTE: 0.2 K/UL (ref 0–1.3)
MONOCYTES ABSOLUTE: 0.4 K/UL (ref 0–1.3)
MONOCYTES RELATIVE PERCENT: 3 %
MONOCYTES RELATIVE PERCENT: 5 %
MYELOCYTE PERCENT: 4 %
NEUTROPHILS ABSOLUTE: 6.2 K/UL (ref 1.7–7.7)
NEUTROPHILS ABSOLUTE: 7.2 K/UL (ref 1.7–7.7)
NEUTROPHILS RELATIVE PERCENT: 86 %
NEUTROPHILS RELATIVE PERCENT: 87 %
OVALOCYTES: ABNORMAL
PDW BLD-RTO: 16.1 % (ref 12.4–15.4)
PDW BLD-RTO: 16.7 % (ref 12.4–15.4)
PELGER HUET CELLS: PRESENT
PH VENOUS: 7.46 (ref 7.35–7.45)
PHOSPHORUS: 2.5 MG/DL (ref 2.5–4.9)
PLATELET # BLD: 10 K/UL (ref 135–450)
PLATELET # BLD: 25 K/UL (ref 135–450)
PLATELET SLIDE REVIEW: ABNORMAL
PMV BLD AUTO: 9.4 FL (ref 5–10.5)
PMV BLD AUTO: 9.6 FL (ref 5–10.5)
POIKILOCYTES: ABNORMAL
POLYCHROMASIA: ABNORMAL
POLYCHROMASIA: ABNORMAL
POTASSIUM SERPL-SCNC: 3.3 MMOL/L (ref 3.5–5.1)
PROSTATE SPECIFIC ANTIGEN: 2.27 NG/ML (ref 0–4)
RBC # BLD: 2.91 M/UL (ref 4.2–5.9)
RBC # BLD: 2.94 M/UL (ref 4.2–5.9)
RBC # BLD: NORMAL 10*6/UL
SLIDE REVIEW: ABNORMAL
SODIUM BLD-SCNC: 128 MMOL/L (ref 136–145)
TARGET CELLS: ABNORMAL
TARGET CELLS: ABNORMAL
TEAR DROP CELLS: ABNORMAL
TOTAL PROTEIN: 4.6 G/DL (ref 6.4–8.2)
WBC # BLD: 7 K/UL (ref 4–11)
WBC # BLD: 7.6 K/UL (ref 4–11)

## 2022-03-31 PROCEDURE — 2060000000 HC ICU INTERMEDIATE R&B

## 2022-03-31 PROCEDURE — 36430 TRANSFUSION BLD/BLD COMPNT: CPT

## 2022-03-31 PROCEDURE — 99233 SBSQ HOSP IP/OBS HIGH 50: CPT | Performed by: INTERNAL MEDICINE

## 2022-03-31 PROCEDURE — 2580000003 HC RX 258: Performed by: INTERNAL MEDICINE

## 2022-03-31 PROCEDURE — 36415 COLL VENOUS BLD VENIPUNCTURE: CPT

## 2022-03-31 PROCEDURE — 6360000002 HC RX W HCPCS: Performed by: STUDENT IN AN ORGANIZED HEALTH CARE EDUCATION/TRAINING PROGRAM

## 2022-03-31 PROCEDURE — 99223 1ST HOSP IP/OBS HIGH 75: CPT | Performed by: INTERNAL MEDICINE

## 2022-03-31 PROCEDURE — 85025 COMPLETE CBC W/AUTO DIFF WBC: CPT

## 2022-03-31 PROCEDURE — 38222 DX BONE MARROW BX & ASPIR: CPT

## 2022-03-31 PROCEDURE — 07DR3ZX EXTRACTION OF ILIAC BONE MARROW, PERCUTANEOUS APPROACH, DIAGNOSTIC: ICD-10-PCS | Performed by: INTERNAL MEDICINE

## 2022-03-31 PROCEDURE — 88313 SPECIAL STAINS GROUP 2: CPT

## 2022-03-31 PROCEDURE — 6370000000 HC RX 637 (ALT 250 FOR IP): Performed by: STUDENT IN AN ORGANIZED HEALTH CARE EDUCATION/TRAINING PROGRAM

## 2022-03-31 PROCEDURE — 88341 IMHCHEM/IMCYTCHM EA ADD ANTB: CPT

## 2022-03-31 PROCEDURE — 88311 DECALCIFY TISSUE: CPT

## 2022-03-31 PROCEDURE — 88342 IMHCHEM/IMCYTCHM 1ST ANTB: CPT

## 2022-03-31 PROCEDURE — 2500000003 HC RX 250 WO HCPCS: Performed by: INTERNAL MEDICINE

## 2022-03-31 PROCEDURE — 2580000003 HC RX 258: Performed by: STUDENT IN AN ORGANIZED HEALTH CARE EDUCATION/TRAINING PROGRAM

## 2022-03-31 PROCEDURE — 92526 ORAL FUNCTION THERAPY: CPT

## 2022-03-31 PROCEDURE — 88305 TISSUE EXAM BY PATHOLOGIST: CPT

## 2022-03-31 PROCEDURE — 80076 HEPATIC FUNCTION PANEL: CPT

## 2022-03-31 PROCEDURE — 84100 ASSAY OF PHOSPHORUS: CPT

## 2022-03-31 PROCEDURE — 76770 US EXAM ABDO BACK WALL COMP: CPT

## 2022-03-31 PROCEDURE — 80048 BASIC METABOLIC PNL TOTAL CA: CPT

## 2022-03-31 PROCEDURE — 84153 ASSAY OF PSA TOTAL: CPT

## 2022-03-31 PROCEDURE — 83735 ASSAY OF MAGNESIUM: CPT

## 2022-03-31 RX ORDER — ATROPINE SULFATE 0.1 MG/ML
0.5 INJECTION INTRAVENOUS ONCE
Status: DISCONTINUED | OUTPATIENT
Start: 2022-03-31 | End: 2022-04-01

## 2022-03-31 RX ORDER — 0.9 % SODIUM CHLORIDE 0.9 %
250 INTRAVENOUS SOLUTION INTRAVENOUS ONCE
Status: COMPLETED | OUTPATIENT
Start: 2022-03-31 | End: 2022-03-31

## 2022-03-31 RX ORDER — POLYETHYLENE GLYCOL 3350 17 G/17G
17 POWDER, FOR SOLUTION ORAL DAILY
Status: DISCONTINUED | OUTPATIENT
Start: 2022-03-31 | End: 2022-04-04 | Stop reason: HOSPADM

## 2022-03-31 RX ORDER — SODIUM CHLORIDE 9 MG/ML
INJECTION, SOLUTION INTRAVENOUS PRN
Status: DISCONTINUED | OUTPATIENT
Start: 2022-03-31 | End: 2022-04-04 | Stop reason: HOSPADM

## 2022-03-31 RX ORDER — SENNA PLUS 8.6 MG/1
1 TABLET ORAL NIGHTLY
Status: DISCONTINUED | OUTPATIENT
Start: 2022-03-31 | End: 2022-04-04 | Stop reason: HOSPADM

## 2022-03-31 RX ADMIN — PIPERACILLIN AND TAZOBACTAM 3375 MG: 3; .375 INJECTION, POWDER, LYOPHILIZED, FOR SOLUTION INTRAVENOUS at 18:24

## 2022-03-31 RX ADMIN — ACETAMINOPHEN 325MG 650 MG: 325 TABLET ORAL at 18:02

## 2022-03-31 RX ADMIN — LINEZOLID 600 MG: 600 INJECTION, SOLUTION INTRAVENOUS at 12:01

## 2022-03-31 RX ADMIN — SODIUM CHLORIDE 250 ML: 9 INJECTION, SOLUTION INTRAVENOUS at 21:18

## 2022-03-31 RX ADMIN — SODIUM BICARBONATE: 84 INJECTION, SOLUTION INTRAVENOUS at 21:17

## 2022-03-31 RX ADMIN — ACETAMINOPHEN 325MG 650 MG: 325 TABLET ORAL at 08:23

## 2022-03-31 RX ADMIN — SODIUM CHLORIDE, PRESERVATIVE FREE 10 ML: 5 INJECTION INTRAVENOUS at 08:31

## 2022-03-31 RX ADMIN — POTASSIUM BICARBONATE 40 MEQ: 782 TABLET, EFFERVESCENT ORAL at 12:16

## 2022-03-31 RX ADMIN — PIPERACILLIN AND TAZOBACTAM 3375 MG: 3; .375 INJECTION, POWDER, LYOPHILIZED, FOR SOLUTION INTRAVENOUS at 06:51

## 2022-03-31 RX ADMIN — TAMSULOSIN HYDROCHLORIDE 0.4 MG: 0.4 CAPSULE ORAL at 08:23

## 2022-03-31 ASSESSMENT — PAIN SCALES - GENERAL
PAINLEVEL_OUTOF10: 0
PAINLEVEL_OUTOF10: 7
PAINLEVEL_OUTOF10: 0
PAINLEVEL_OUTOF10: 5
PAINLEVEL_OUTOF10: 0
PAINLEVEL_OUTOF10: 3
PAINLEVEL_OUTOF10: 0
PAINLEVEL_OUTOF10: 5

## 2022-03-31 ASSESSMENT — PAIN DESCRIPTION - PAIN TYPE
TYPE: ACUTE PAIN
TYPE: ACUTE PAIN

## 2022-03-31 ASSESSMENT — PAIN DESCRIPTION - FREQUENCY
FREQUENCY: INTERMITTENT
FREQUENCY: INTERMITTENT

## 2022-03-31 ASSESSMENT — PAIN DESCRIPTION - DESCRIPTORS
DESCRIPTORS: ACHING
DESCRIPTORS: ACHING

## 2022-03-31 ASSESSMENT — PAIN DESCRIPTION - ORIENTATION
ORIENTATION: MID
ORIENTATION: LOWER

## 2022-03-31 ASSESSMENT — PAIN DESCRIPTION - LOCATION
LOCATION: BACK
LOCATION: BACK

## 2022-03-31 ASSESSMENT — PAIN - FUNCTIONAL ASSESSMENT: PAIN_FUNCTIONAL_ASSESSMENT: PREVENTS OR INTERFERES WITH MANY ACTIVE NOT PASSIVE ACTIVITIES

## 2022-03-31 ASSESSMENT — PAIN DESCRIPTION - PROGRESSION: CLINICAL_PROGRESSION: GRADUALLY WORSENING

## 2022-03-31 ASSESSMENT — PAIN DESCRIPTION - ONSET
ONSET: ON-GOING
ONSET: ON-GOING

## 2022-03-31 NOTE — PROGRESS NOTES
Progress Note    Admit Date: 3/29/2022  Day: 3  Diet: ADULT DIET; Dysphagia - Soft and Bite Sized    CC: Weakness, Hypotension    Interval history:   Patient had platelets of 10, will get one unit of platelets this morning  Seen and assessed at bedside  Patient is alert this morning but is only oriented x2  Strength and BP are improved  Catheter draining urine  Patient denies fevers, chills, SOB, chest or abdominal pain, N/V, diarrhea. Plan for bone marrow biopsy today    HPI:   Ovi Plaza is a 66year old Male with a PmHx of prostate cancer with metastasis to the pelvis, asthma, lipoma resection, urinary retention, hernia repair who present to the ED with a chief complaint of hypotension, diarrhea, fatigue. Currently undergoing radiation therapy, but no chemotherapy for prostate cancer and at his last appointment a few days ago he was hypotensive to 70's/40's which improved after a 1L bolus. Last weekend he endorsed non-bloody diarrhea, intermittent cramping abdominal pain, generalized weakness, and N/V which resolved by Sunday. Family endorses increased weakness and decreased PO intake due to general malaise and N/V. He has increasing weakness for the last few weeks but was still able to walk and drive before the weekend, but has worsened since. He just started radiation therapy on 3/21 and has received one dose of hormonal therapy. Not on chemo and no plans for surgery. Does endorse some burning and discomfort upon urination. He was able a weak ago to drive himself home and now he is substantially more weak.      He denies fever, chills, chest pain, shortness of breath, dysuria, hematuria, melena, hematochezia, numbness, unilateral weakness, lower extremity weakness, bowel or bladder incontinence.     In the ED, he is hyponatremic to 121, creatinine 4.9, anion gap of 21, LA 1.2, BNP elevated to 7944 (last echo 11/2021 w/ EF 50% and indeterminate diastolic), , platelets 11, VBG 5.96/10.2/92.  No acute changes to chest XR. Medications:     Scheduled Meds:   potassium bicarb-citric acid  40 mEq Oral Once    linezolid  600 mg IntraVENous Q12H    piperacillin-tazobactam  3,375 mg IntraVENous Q12H    tamsulosin  0.4 mg Oral Daily    sodium chloride flush  5-40 mL IntraVENous 2 times per day    lidocaine  1 patch TransDERmal Daily     Continuous Infusions:   sodium chloride      sodium chloride      sodium chloride       PRN Meds:sodium chloride, sodium chloride flush, sodium chloride, ondansetron **OR** ondansetron, polyethylene glycol, acetaminophen **OR** acetaminophen, sodium chloride    Objective:   Vitals:   T-max:  Patient Vitals for the past 8 hrs:   BP Temp Temp src Pulse Resp SpO2 Weight   03/31/22 0816 -- -- -- -- -- 92 % 159 lb 2.8 oz (72.2 kg)   03/31/22 0812 (!) 133/58 97.9 °F (36.6 °C) Oral 98 23 -- --   03/31/22 0705 121/63 97.8 °F (36.6 °C) Oral 76 18 94 % --   03/31/22 0629 116/71 97.8 °F (36.6 °C) Oral 73 18 95 % --   03/31/22 0345 (!) 101/53 98 °F (36.7 °C) Oral 74 19 95 % --       Intake/Output Summary (Last 24 hours) at 3/31/2022 1053  Last data filed at 3/31/2022 9346  Gross per 24 hour   Intake 4040.62 ml   Output 1975 ml   Net 2065.62 ml       Review of Systems  A full 10 point ROS was performed with pertinent + and - listed above    Physical Exam  Constitutional:       General: He is not in acute distress. Appearance: He is ill-appearing. HENT:      Head: Normocephalic. Right Ear: External ear normal.      Left Ear: External ear normal.      Nose: Nose normal.     Pharynx: Oropharynx is clear. No oropharyngeal exudate. Eyes:      Extraocular Movements: Extraocular movements intact. Conjunctiva/sclera: Conjunctivae normal.      Pupils: Pupils are equal, round, and reactive to light. Cardiovascular:      Rate and Rhythm: Normal rate and regular rhythm. Pulses: Normal pulses. Heart sounds: No friction rub. No gallop.     Pulmonary:      Effort: Pulmonary effort is normal. No respiratory distress. Breath sounds: No stridor. No wheezing, rhonchi. Slight crackles at lung bases with decreased breath sounds. Abdominal:      General: There is distension. Palpations: Abdomen is soft. Tenderness: There is no abdominal tenderness. There is no right CVA tenderness, left CVA tenderness, guarding or rebound. Musculoskeletal:         General: No deformity. Now has bilateral LE swelling, 2+ pitting edema  Skin:     General: Skin is warm. Coloration: Skin is not jaundiced. Findings: No bruising, erythema or lesion. Neurological:      Mental Status: He is alert and oriented to person and time but not place     Cranial Nerves: No cranial nerve deficit. Motor: Weakness present. Comments: Weakness most prominent in the b/l LE 4/5. Full strength in b/l UE. Difficulty leaning forward. Strength has improved    LABS:    CBC:   Recent Labs     03/30/22 0320 03/30/22  1800 03/31/22 0325   WBC 5.9 6.7 7.0   HGB 9.9* 9.7* 8.4*   HCT 29.2* 28.0* 25.1*   PLT 24* 11* 10*   MCV 85.1 84.3 85.3     Renal:    Recent Labs     03/29/22  1135 03/30/22 0320 03/31/22 0325 03/31/22  1010   * 127* 128*  --    K 3.6 3.3* 3.3*  --    CL 85* 92* 91*  --    CO2 15* 19* 25  --    * 124* 119*  --    CREATININE 4.9* 3.7* 2.7*  --    GLUCOSE 145* 121* 141*  --    CALCIUM 8.1* 8.0* 7.7*  --    MG  --  2.30 2.30  --    PHOS  --   --   --  2.5   ANIONGAP 21* 16 12  --      Hepatic:   Recent Labs     03/29/22  1135 03/30/22 0320 03/31/22 0325   * 96* 135*   ALT 78* 58* 71*   BILITOT 1.3* 1.7* 1.8*   BILIDIR  --  1.2* 1.4*   PROT 5.7* 5.2* 4.6*   LABALBU 2.5* 2.3* 1.8*   ALKPHOS 226* 223* 273*     Troponin:   Recent Labs     03/29/22  1135   TROPONINI <0.01     BNP: No results for input(s): BNP in the last 72 hours. Lipids: No results for input(s): CHOL, HDL in the last 72 hours.     Invalid input(s): LDLCALCU, TRIGLYCERIDE  ABGs:  No results for input(s): PHART, JLT8OKS, PO2ART, MUE8MDY, BEART, THGBART, Q0NYVRHM, TRG5QCW in the last 72 hours. INR:   Recent Labs     03/29/22  1135   INR 1.04     Lactate: No results for input(s): LACTATE in the last 72 hours. Cultures:  -----------------------------------------------------------------  RAD:   FL MODIFIED BARIUM SWALLOW W VIDEO   Final Result      1. No laryngeal penetration or aspiration. US ABDOMEN LIMITED   Final Result   IMPRESSION :      Gallbladder polyps, the largest of which measures 6 x 5 mm. One year follow up ultrasound recommended. Left hydronephrosis, as noted on the prior study. Mildly heterogeneous increased hepatic echogenicity. Findings may reflect mild steatosis. US RENAL COMPLETE   Final Result   IMPRESSION :      Gallbladder polyps, the largest of which measures 6 x 5 mm. One year follow up ultrasound recommended. Left hydronephrosis, as noted on the prior study. Mildly heterogeneous increased hepatic echogenicity. Findings may reflect mild steatosis. CT ABDOMEN PELVIS WO CONTRAST Additional Contrast? None   Final Result      1. Moderate left hydronephrosis with an obstructing 3 mm calculus at the left UVJ. 2.  Trace left pleural effusion with associated mild airspace consolidation which could represent atelectasis and/or pneumonia. POC US [facility]   Final Result      XR CHEST PORTABLE   Final Result   1. No interval changes. US RENAL COMPLETE    (Results Pending)       Assessment/Plan:     Elizabeth Boudreaux is a 66year old Male with a PmHx of prostate cancer with metastasis to the pelvis, asthma, lipoma resection, hernia repair who present to the ED with a chief complaint of hypotension, diarrhea, fatigue.      Hypovolemic hyponatremia likely 2/2 viral gastroenteritis and poor PO intake  Hypotension  Patient is acutely dehydrated and hypotensive, responding to IVF.  Diarrhea and N/V over the weekend has since resolved and patient endorse poor PO intake since then. - Nephrology consulted, appreciate recs  - Blood culture x2, respiratory, urine culture to rule out sepsis  - Bicarb gtt  - Urine studies showed low urine sodium, urine osmol 397 which is mildly low  - Dietician consulted due to poor PO intake  - Follow BMP and mag  - PT/OT  - SLP w/ MBS     AQUILINO  Hx of Urinary retention  Likely 2/2 poor PO intake, N/V, and diarrhea. However he has a hx of urinary retention so it might also be due to obstruction. 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 Likely acute renal failure causing elevated BMP. - Urology consult, appreciate recs  - CT abdomen without contrast showed L renal stone 3mm, obstructing with hydronephrosis. Per urology, give pt a chance to pass it  - Leblanc catheter in place  - Home flomax  - Renal US  - Bicarb gtt 50 ml/hr     Suspected UTI vs. pneumonia  Urinalysis showed 2+ WBC with burining upon urination and hx of retention. Did not meets sepsis criteria. CT chest showed L pleural effusion, possible atelectasis or pneumonia. Did fail swallow eval. Afebrile, normal WBC. - Zosyn and Zyvox (3/30 - )  - procal >100  - Urine, blood, and respiratory culture pending, NGTD  - ID consulted, appreciate recs     Prostate cancer  Started radiation therapy on 3/21. S/p 1 round of hormonal therapy. Not undergoing chemotherapy or plans for surgery  - Consulted urology, appreciate recs  - Consulted oncology, Dr. Raysa Ewing, appreciate recs  - Monitor BMP  - Monitor for signs of retention     Thrombocytopenia 2/2 radiation therapy  Platelets 07S today, baseline less than a month ago >400k s/p radiation therapy. Need to rule out TTP/HUS. Concern for sepsis as well.   - Hematology and oncology consulted, appreciate recs  - CBC daily  - Transfuse platelets <43, one unit this morning  - Path review, smear  - Haptoglobin mildly elevated  - PT/INR normal, TTP/HUS is on the differential  - PTT and fibrinogen  - Holding anticoag     Anemia  New onset. Baseline is 13. Low iron counts and in the context of thrombocytopenia and recent radiation treatments. - Blood smear pending  - Normal MCV and ferritin but low iron counts and iron sat which could be consistent with anemia of chronic disease, however the drop is acute  - Monitor CBC    Anion gap metabolic acidosis - resolved  No clear cause with initial LA normal. Respiratory compensation.  - Denies alcohol use  - Anion gap closed to 16 on recheck  - on bicarb gtt, can likely transition to NS tomorrow or per nephro recs     Transaminitis  Recent diarrhea and N/V with elevated levels could warrant a hepatitis work-up.   - Abdominal US showed mild steatosis and GB polyps  - Trend liver panels: stable     Chronic back pain  - Fentanyl patch      Code Status: Full code  FEN: Dysphagia diet  PPX:SCD  DISPO: Isac Morel MD, PGY-1  03/31/22  10:53 AM    This patient has been staffed and discussed with Ridge Matos MD.

## 2022-03-31 NOTE — PROGRESS NOTES
[facility]   Consult Note  Darrel Joel    :  [id number]    MRN:  [id number]    Reason for Consult: Anemia, thrombocytopenia    Subjetive: No acute events overnight. Patient seen and examined at been side. Patient reports back pain had Improved. Patient denies shortness of breath, chest pain, chills, nausea, vomiting He denies urinary frequency, dysuria. Patient is hemodynamically stable and afebrile He remains on SCDs      HPI: Darrel Joel is a 66 y.o.  male patient, who was seen at the request of Dr. Ezekiel Toledo MD.     Patient has a PMH as below notable for prostate cancer with pelvic metastases, asthma, urinary retention, inguinal hernia repair who presented with hypotension diarrhea and fatigue. Patient had the first session of radiation therapy on 3/21 and received 1 dose of hormonal therapy. Patient is not on chemo and has no plans for surgery. Patient endorsed some burning and discomfort with urination. Patient reports a week ago he was more stronger oregano he substantially more weak. Patient was found hypotensive in his last appointment which improved after 1 L bolus of fluids. Patient reported that since last weekend he has been having nonbloody diarrhea, intermittent cramping abdominal pain, generalized weakness, and nausea vomiting which resolved on .   After this episodes family and patient report increased weakness and decreased p.o. intake due to general malaise and nausea and vomiting.     The patient denies abdominal or flank pain, anorexia, nausea or vomiting, dysphagia, change in bowel habits or black or bloody stools or weight loss.     Patient denies any exertional chest pain, dyspnea, palpitations, syncope, orthopnea, edema or paroxysmal nocturnal dyspnea.     The patient denies  frequency or hematuria.     He denies constitutional symptoms of fatigue, weakness, weight loss or gain, fevers, night sweats.         On admission patient was hypotensive and afebrile. Sodium 121, creatinine 4.9, anion gap 21, BNP elevated 7944, , platelets 11. No acute changes were found on a chest x-ray. Hematology was consulted for further work-up and management of anemia thrombocytopenia       HISTORY OF PRESENT ILLNESS:    Past Medical History:     has a past medical history of Asthma, Environmental allergies, Inguinal hernia, and Prostate cancer ([address] Past Surgical History:    Past Surgical History:   Procedure Laterality Date    INGUINAL HERNIA REPAIR Bilateral 6/20/2019    LAPAROSCOPIC BILATERAL INGUINAL HERNIA REPAIR WITH MESH performed by Jazmin Bland MD at [facility]         Current Medications:     potassium bicarb-citric acid  40 mEq Oral Once    linezolid  600 mg IntraVENous Q12H    piperacillin-tazobactam  3,375 mg IntraVENous Q12H    tamsulosin  0.4 mg Oral Daily    sodium chloride flush  5-40 mL IntraVENous 2 times per day    lidocaine  1 patch TransDERmal Daily     Allergies: Allergies   Allergen Reactions    Other      dust,,animal dander,pollen      Social History:    reports that he has never smoked. He has never used smokeless tobacco. He reports current alcohol use. He reports that he does not use drugs. Family History:     family history is not on file. ROS  Review of Systems   Constitutional: Positive for activity change, appetite change and fatigue. Negative for chills, diaphoresis, fever and unexpected weight change. HENT: Negative for trouble swallowing. Eyes: Negative for redness and visual disturbance. Respiratory: Negative for cough, choking, chest tightness and shortness of breath. Cardiovascular: Negative for chest pain and palpitations. Gastrointestinal: Positive for abdominal distention, diarrhea, nausea and vomiting. Negative for abdominal pain and constipation. Endocrine: Negative for polydipsia and polyphagia. Genitourinary: Positive for difficulty urinating. Negative for dysuria, flank pain and hematuria. Musculoskeletal: Negative for arthralgias, myalgias and neck stiffness. Skin: Negative for rash and wound. Allergic/Immunologic: Negative for environmental allergies and food allergies. Neurological: Positive for weakness and light-headedness. Negative for dizziness, seizures, syncope, facial asymmetry, numbness and headaches.      ROS: A 10 point review of systems was conducted, significant findings as noted in HPI. Physical Exam:   BP (!) 133/58   Pulse 98   Temp 97.9 °F (36.6 °C) (Oral)   Resp 23   Ht 5' 6\" (1.676 m)   Wt 159 lb 2.8 oz (72.2 kg)   SpO2 90%   BMI 25.69 kg/m²      Physical Exam  Constitutional:       Appearance: Normal appearance. He is ill-appearing. HENT:      Head: Normocephalic. Mouth/Throat:      Mouth: Mucous membranes are moist.   Eyes:      Extraocular Movements: Extraocular movements intact. Conjunctiva/sclera: Conjunctivae normal.      Pupils: Pupils are equal, round, and reactive to light. Cardiovascular:      Rate and Rhythm: Normal rate and regular rhythm. Pulses: Normal pulses. Heart sounds: Normal heart sounds. Pulmonary:      Effort: Pulmonary effort is normal.      Breath sounds: Normal breath sounds. Abdominal:      General: Abdomen is flat. Bowel sounds are normal. There is distension. Palpations: Abdomen is soft. Musculoskeletal:         General: Normal range of motion. Cervical back: Normal range of motion. Skin:     General: Skin is dry. Neurological:      General: No focal deficit present. Mental Status: He is alert and oriented to person, place, and time. Mental status is at baseline. Motor: Weakness (Weakness most prominent in the b/l LE 3/5. Full strength in b/l UE.  Difficulty leaning forward.  ) present.         DATA:  CBC:   Recent Labs     03/30/22  0320 03/30/22  1800 03/31/22  0325   WBC 5.9 6.7 7.0   HGB 9.9* 9.7* 8.4*   HCT 29.2* 28.0* 25.1*   MCV 85.1 84.3 85.3   PLT 24* 11* 10*     BMP:   Recent Labs     03/29/22  1135 03/30/22  0320 03/31/22  0325   * 127* 128*   K 3.6 3.3* 3.3*   CL 85* 92* 91*   CO2 15* 19* 25   * 124* 119*   CREATININE 4.9* 3.7* 2.7*     LIVER PROFILE:   Recent Labs     03/29/22  1135 03/30/22  0320 03/31/22  0325   * 96* 135*   ALT 78* 58* 71*   LIPASE 28.0  --   --    BILIDIR  --  1.2* 1.4*   BILITOT 1.3* 1.7* 1.8*   ALKPHOS 226* 223* 273*     PT/INR:    Lab Results   Component Value Date    PROTIME 11.8 03/29/2022    PROTIME 11.0 06/20/2019    INR 1.04 03/29/2022    INR 0.96 06/20/2019     PTT:    Lab Results   Component Value Date    APTT 30.7 03/29/2022    APTT 32.2 06/20/2019     UA:  Recent Labs     03/29/22  1453   COLORU Yellow   PHUR 6.0   WBCUA 3-5   RBCUA 0-2   BACTERIA 2+*   CLARITYU Clear   SPECGRAV 1.020   LEUKOCYTESUR SMALL*   UROBILINOGEN 0.2   BILIRUBINUR SMALL*   BLOODU MODERATE*   GLUCOSEU Negative     Magnesium:    Lab Results   Component Value Date    MG 2.30 03/31/2022    MG 2.30 03/30/2022    MG 2.20 08/27/2019     PEDRO LUIS:  No results found for: ANATITER, PEDRO LUIS  Uric Acid:  No components found for: URIC  Reticulocyte Count:    Lab Results   Component Value Date    IRF 0.22 03/29/2022     CRP:  No components found for: CPR  Rosario/Direct Antibody Test:  No components found for: [address], [place], [place]    IMPRESSION/RECOMMENDATIONS:    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 Today 10. INR 1.04, fibrinogen 856, PTT 30. Due to diarrhea and AQUILINO there is suspicion of TTP Blood smear evaluated with Hematology pathologist and there is some concern of dysplasia. . Haptoglobin 260  -Continue supportive care  -Monitor CBC, transfuse if platelets <10  -Bone marrow biopsy for today   -Continue holding anticoagulation     Normocytic anemia with concern of AML  Hemoglobin on February 13 0.6. On presentation around 10. MCV 85.   RDW 16.  Reticulocyte count with adequate bone marrow response. Uric acid 9.9  -Continue to monitor CBC  -Bone marrow biopsy for tomorrow    Thank you for the opportunity to participate in [facility]. Please do not hesitate to contact me for questions or concerns regarding this patient's evaluation.       W/D/W/A Dr. Nicoletta Moritz, PGY1  3/31/2022  9:51 AM

## 2022-03-31 NOTE — PROGRESS NOTES
PT hypotensive this evening with bp of 93/53 map of 64 and repeat bp of 90/54 with map of 64. Other vitals are within normal range. He currently has bicarb 100meq in .45% normal saline going at 50ml/hr. Dr. Loura Scheuermann notified. No new orders at this time, asked to notify MD if bp continues to drop. NP currently cycling q30min.

## 2022-03-31 NOTE — CARE COORDINATION
9:06 AM SW spoke with patient about the recommendation for patient to be placed in a SNF before returning home. Provided patient with SNF list and discussed services that would be provided while at SNF.

## 2022-03-31 NOTE — PROGRESS NOTES
Occupational Therapy  Inga Malin    Pt on hold this date per RN, bone marrow biopsy at 9am. Continue with POC.   Mazin Weeks, [address] [facility] PT  [phone]

## 2022-03-31 NOTE — PROGRESS NOTES
Spoke with JESSICA Voss regarding pt platelet count of 10 this morning in preparation for bone marrow biopsy today. Ordered to give 1 unit of platelets.

## 2022-03-31 NOTE — PROGRESS NOTES
Physician Progress Note      PATIENT:               Rafael Rodriguez  CSN #:                  [id number]  :                       1950  ADMIT DATE:       3/29/2022 10:47 AM  DISCH DATE:  RESPONDING  PROVIDER #:        Damion Lemus MD          QUERY TEXT:    Pt admitted with  and has mental status changes documented. If possible,   please document in progress notes and discharge summary further specificity   regarding the type of encephalopathy:    The medical record reflects the following:  Risk Factors: 65 yo normally alert and oriented X4 with hyponatremia, AQUILINO  Clinical Indicators: Per ED: He is alert and oriented to person, place, and   time. 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 Per consult 3/30: Very lethargic   during rounds. Per PN 3/31: He is alert and oriented to person, place, and   time. Mental status is at baseline. Treatment: fall precautions, lab monitoring, bed alarm, increased rounding  Options provided:  -- Metabolic encephalopathy  -- No encephalopathy  -- Other - I will add my own diagnosis  -- Disagree - Not applicable / Not valid  -- Disagree - Clinically unable to determine / Unknown  -- Refer to Clinical Documentation Reviewer    PROVIDER RESPONSE TEXT:    This patient has metabolic encephalopathy.     Query created by: Tima Corcoran on 3/31/2022 12:09 PM      Electronically signed by:  Damion Lemus MD 3/31/2022 2:10 PM

## 2022-03-31 NOTE — PLAN OF CARE
Problem: Falls - Risk of:  Goal: Will remain free from falls  Description: Will remain free from falls  3/31/2022 1444 by Bin Ceballos RN  Outcome: Ongoing   Call light within reach, floor clear, family at bedside. Problem: Safety:  Goal: Ability to chew and swallow food without choking will improve  Description: Ability to chew and swallow food without choking will improve  3/31/2022 1444 by Bin Ceballos RN  Outcome: Ongoing   Patient tolerating PO intake, followed by speech. Problem: Skin Integrity:  Goal: Will show no infection signs and symptoms  Description: Will show no infection signs and symptoms  3/31/2022 1444 by Bin Ceballos RN  Outcome: Ongoing   No s/s of skin breakdown of infection.    Problem: Pain:  Goal: Pain level will decrease  Description: Pain level will decrease  3/31/2022 1444 by Bin Ceballos RN  Outcome: Ongoing   Patient satisfied with pain control with tylenol, will continue to assess

## 2022-03-31 NOTE — PROGRESS NOTES
Nephrology  Note                                                                                                                                                                                                                                                                                                                                                               Office : [phone]     Fax :[phone]              Patient's Name: Maria C Tariq  10:18 AM  3/31/2022    Reason for Consult: AQUILINO  Requesting Physician:  Madhuri Alaniz MD      Chief Complaint:  Helena Aures        03/31/22     Pt has good UO   Cr better   No diarrhea       Past Medical History:   Diagnosis Date    Asthma     Environmental allergies     Inguinal hernia     Prostate cancer [facility])        Past Surgical History:   Procedure Laterality Date    INGUINAL HERNIA REPAIR Bilateral 6/20/2019    LAPAROSCOPIC BILATERAL INGUINAL HERNIA REPAIR WITH MESH performed by Sherry Daugherty MD at [address] [place] [address]      back        History reviewed. No pertinent family history. reports that he has never smoked. He has never used smokeless tobacco. He reports current alcohol use. He reports that he does not use drugs. Allergies:   Other    Current Medications:    0.9 % sodium chloride infusion, PRN  potassium bicarb-citric acid (EFFER-K) effervescent tablet 40 mEq, Once  linezolid (ZYVOX) IVPB 600 mg, Q12H  piperacillin-tazobactam (ZOSYN) 3,375 mg in dextrose 5 % 50 mL IVPB (mini-bag), Q12H  tamsulosin (FLOMAX) capsule 0.4 mg, Daily  sodium chloride flush 0.9 % injection 5-40 mL, 2 times per day  sodium chloride flush 0.9 % injection 5-40 mL, PRN  0.9 % sodium chloride infusion, PRN  ondansetron (ZOFRAN-ODT) disintegrating tablet 4 mg, Q8H PRN   Or  ondansetron (ZOFRAN) injection 4 mg, Q6H PRN  polyethylene glycol (GLYCOLAX) packet 17 g, Daily PRN  acetaminophen (TYLENOL) tablet 650 mg, Q6H PRN   Or  acetaminophen (TYLENOL) suppository 650 mg, Q6H PRN  lidocaine 4 % external patch 1 patch, Daily  0.9 % sodium chloride infusion, PRN        Review of Systems:   14 point ROS obtained but were negative except mentioned in HPI      Physical exam:     Vitals:  BP (!) 133/58   Pulse 98   Temp 97.9 °F (36.6 °C) (Oral)   Resp 23   Ht 5' 6\" (1.676 m)   Wt 159 lb 2.8 oz (72.2 kg)   SpO2 90%   BMI 25.69 kg/m²   Constitutional:  OAA X3 NAD  Skin: no rash, turgor wnl  Heent:  eomi, mmm  Neck: no bruits or jvd noted  Cardiovascular:  S1, S2 without m/r/g  Respiratory: CTA B without w/r/r  Abdomen:  +bs, soft, nt, nd  Ext: No lower extremity edema  Psychiatric: mood and affect appropriate  Musculoskeletal:  Rom, muscular strength intact    Data:   Labs:  CBC:   Recent Labs     03/30/22 0320 03/30/22  1800 03/31/22 0325   WBC 5.9 6.7 7.0   HGB 9.9* 9.7* 8.4*   PLT 24* 11* 10*     BMP:    Recent Labs     03/29/22 1135 03/30/22 0320 03/31/22 0325   * 127* 128*   K 3.6 3.3* 3.3*   CL 85* 92* 91*   CO2 15* 19* 25   * 124* 119*   CREATININE 4.9* 3.7* 2.7*   GLUCOSE 145* 121* 141*     Ca/Mg/Phos:   Recent Labs     03/29/22  1135 03/30/22  0320 03/31/22  0325 03/31/22  1010   CALCIUM 8.1* 8.0* 7.7*  --    MG  --  2.30 2.30  --    PHOS  --   --   --  2.5     Hepatic:   Recent Labs     03/29/22 1135 03/30/22  0320 03/31/22  0325   * 96* 135*   ALT 78* 58* 71*   BILITOT 1.3* 1.7* 1.8*   ALKPHOS 226* 223* 273*     Troponin:   Recent Labs     03/29/22 1135   TROPONINI <0.01     BNP: No results for input(s): BNP in the last 72 hours. Lipids: No results for input(s): CHOL, TRIG, HDL, LDLCALC, LABVLDL in the last 72 hours. ABGs: No results for input(s): PHART, PO2ART, OTQ0SYM in the last 72 hours.   INR:   Recent Labs     03/29/22  1135   INR 1.04     UA:  Recent Labs     03/29/22  1453   COLORU Yellow   CLARITYU Clear   GLUCOSEU Negative   BILIRUBINUR SMALL*   KETUA TRACE*   SPECGRAV 1.020   BLOODU MODERATE*   PHUR 6.0   PROTEINU TRACE*   UROBILINOGEN 0.2   NITRU Negative   LEUKOCYTESUR SMALL*   LABMICR YES   [facility] Hotter NotGiven      Urine Microscopic:   Recent Labs     03/29/22  1453   BACTERIA 2+*   WBCUA 3-5   RBCUA 0-2   EPIU 0-1     Urine Culture: No results for input(s): LABURIN in the last 72 hours. Urine Chemistry:   Recent Labs     03/29/22  1453   LABCREA 80.1   NAUR <20             IMAGING:  FL MODIFIED BARIUM SWALLOW W VIDEO   Final Result      1. No laryngeal penetration or aspiration. US ABDOMEN LIMITED   Final Result   IMPRESSION :      Gallbladder polyps, the largest of which measures 6 x 5 mm. One year follow up ultrasound recommended. Left hydronephrosis, as noted on the prior study. Mildly heterogeneous increased hepatic echogenicity. Findings may reflect mild steatosis. US RENAL COMPLETE   Final Result   IMPRESSION :      Gallbladder polyps, the largest of which measures 6 x 5 mm. One year follow up ultrasound recommended. Left hydronephrosis, as noted on the prior study. Mildly heterogeneous increased hepatic echogenicity. Findings may reflect mild steatosis. CT ABDOMEN PELVIS WO CONTRAST Additional Contrast? None   Final Result      1. Moderate left hydronephrosis with an obstructing 3 mm calculus at the left UVJ. 2.  Trace left pleural effusion with associated mild airspace consolidation which could represent atelectasis and/or pneumonia. POC US [facility]   Final Result      XR CHEST PORTABLE   Final Result   1. No interval changes. MRI LUMBAR SPINE W WO CONTRAST    (Results Pending)   US RENAL COMPLETE    (Results Pending)       Assessment/Plan   AQUILINO  UTI  Cr on admission was 4.9. Baseline was 0.9. Patient endorsing left flank pain. Recently having poor PO intake.  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     -Cr  - improving   -Urology consulted- Leblanc placed   - SANTI today   -Serum osmolality 315 / Urine osmol 397 / Urine Na <20   -Continue Ceftriaxone  3/29   -Continue Na Bicarb 50/h    Anemia  Hx of prostate cancer with mets. Started radiation therapy on 3/21.  S/p 1 round of hormonal therapy     Acid- base/ Electrolyte imbalance   Hyponatremia   Hypokalemia   - monitor           Recommend to dose adjust all medications  based on renal functions  Maintain SBP> 90 mmHg   Daily weights   AVOID NSAIDs  Avoid Nephrotoxins  Monitor Intake/Output  Call if significant decrease in urine output     Brionna Lin MD

## 2022-03-31 NOTE — PLAN OF CARE
Problem: Falls - Risk of:  Goal: Will remain free from falls  Description: Will remain free from falls  Outcome: Ongoing  Note: Pt is a High fall risk. See Burnice Cone Fall Score and ABCDS Injury Risk assessments.   + Screening for Orthostasis and/or + High Fall Risk per ZHANG/ABCDS: Explained fall risk precautions to pt and family and rationale behind their use to keep the patient safe. Pt bed is in low position, side rails up, call light and belongings are in reach. Fall wristband applied and present on pts wrist.  Bed alarm on. Pt encouraged to call for assistance. Will continue with hourly rounds for PO intake, pain needs, toileting and repositioning as needed. Problem: Safety:  Goal: Ability to chew and swallow food without choking will improve  Description: Ability to chew and swallow food without choking will improve  Outcome: Ongoing  Note: Pt swallowed water without incident this shift. RN monitored pt each time pt drank water. Problem: Pain:  Goal: Pain level will decrease  Description: Pain level will decrease  Outcome: Ongoing  Note: Pt complains of lower back pain. Pt received prn tylenol per orders.

## 2022-03-31 NOTE — PROGRESS NOTES
MD Shruthi Briseno notified of pt critical platelet count of 10 and potassium of 3.3. No new orders at this time.

## 2022-03-31 NOTE — PROGRESS NOTES
Speech Language Pathology  Facility/Department: [address]  Dysphagia Daily Treatment Note    NAME: Ivonne Apple  :   MRN: [id number]    Patient Diagnosis(es):   Patient Active Problem List    Diagnosis Date Noted    AQUILINO (acute kidney injury) ([facility] 2022    Hypovolemia     Increased anion gap metabolic acidosis     Thrombocytopenia ([address]     Prostate cancer ([facility] [facility] 2022    Non-recurrent bilateral inguinal hernia without obstruction or gangrene      Allergies: Allergies   Allergen Reactions    Other      dust,,animal dander,pollen     CXR (3/29/22)-  Impression    1. No interval changes.         Chart reviewed. Medical Diagnosis: Hypovolemia [E86.1]  ARF (acute renal failure) (HCC) [N17.9]  Thrombocytopenia (HCC) [D69.6]  AQUILINO (acute kidney injury) ([address] [N17.9]  Increased anion gap metabolic acidosis [H93.7]   Treatment Diagnosis:    BSE Impression (date)-  Dysphagia Impression : Pt is cooperative and able to participate in oral care with assistance. He is oriented to self and time (); however he is disoriented to [facility]. Pt demonstrates suspected moderate pharyngeal swallow deficits (including overt clinical s/s aspiration (strong wet cough), multiple swallows per bolus, frequent grimacing/gasping with swallows of thin liquids). Recommend instrumental assessment (MBS) of pt's swallow function and NPO pending MBS findings.   Dysphagia Diagnosis: Moderate pharyngeal stage dysphagia,Suspected needs further assessment    MBS results (3/30/22)-  Oral Phase: Mild oral dysphagia characterized by prolonged mastication of regular texture solids, with slowed a-p transit, reduced tongue base retraction, and mild oral stasis (cleared with subsequent swallow/liquid).     Pharyngeal: Mild pharyngeal phase dysphagia characterized by delayed and reduced hyolarygneal mechanics and pharyngeal peristalsis resulting in premature spillage of bolus to valleculae/pyrifroms and mild pharyngeal residue. Of note, adequate airway protection throughout, with no aspiration or penetration visualized; cough/throat clear occured x1, however was not associated with any aspiration.      Upper Esophageal Screen: Indirectly assessed; appeared to have adequate UES opening, no reflux visualized. Pain:  Denies     Current Diet : ADULT DIET; Dysphagia - Soft and Bite Sized   Recommended Form of Meds:  (as determined by MBS)  Compensatory Swallowing Strategies: Alternate solids and liquids,Eat/Feed slowly,Upright as possible for all oral intake,Small bites/sips,Remain upright for 30-45 minutes after meals,Effortful swallow     Treatment:  Pt seen bedside to address the following goals:  Short-term Goals  Timeframe for Short-term Goals: 1-2 wks or LOS  Goal 1: The patient/caregiver will demonstrate understanding of recommendations for improved swallowing safety. 3/31/22: Pt tolerating his current diet without overt s/ of aspiration per report. MBS reviewed. Safe swallow precautions reviewed & recommend diet. He was observed with regular solids, requiring increased time for mastication with frequent sips of water. Current recommended diet remains appropriate. Pt reports occasional hiccupping, belching, feeling full quickly & globus in chest. If these symptoms persist, consider GI evaluation for esophageal dysphagia. Pt & wife thoroughly educated. Goal 2:The patient will tolerate instrumental swallowing procedure  3/30/22: GOAL MET    Patient/Family/Caregiver Education:  See above     Compensatory Strategies:  Compensatory Swallowing Strategies:  Alternate solids and liquids,Eat/Feed slowly,Upright as possible for all oral intake,Small bites/sips,Remain upright for 30-45 minutes after meals,Effortful swallow      Plan:  No further ST services indicated    Diet recommendations: Soft & Bite Size, Thin Liquids   DC recommendation: Per PT/OT  Treatment: 1541-3471, 12 mins   D/W

## 2022-03-31 NOTE — CONSULTS
Infectious Diseases Inpatient Consult Note    Reason for Consult:   Hypotension, elevated procalcitonin  Requesting Physician:   Dr Mili Smith  Primary Care Physician:  Vanessa George MD  History Obtained From:   Gerard, EPIC    Admit Date: 3/29/2022  Hospital Day: 3    CHIEF COMPLAINT:       Chief Complaint   Patient presents with    Extremity Weakness    Fatigue    Groin Pain    Emesis    Diarrhea       HISTORY OF PRESENT ILLNESS:      67 yo man with hx advanced, metastatic prostate cancer   Receiving XRT  Hx urinary retention, has had catheter in / out (ED visit, TUG visits)    Presented to ED with fatigue, loose stool and hypotension. He started XRT around 3/22. Was 'fine' until 3/25. On Fri, pt had low BP and weakness at XRT. He had episode n/v  He was weak over weekend. Contacted his sister on Sun. Stayed at her house on Mon. Pt had intermittent GI sx - crampy abd pain, nausea with vomiting. Pt has had diminished po intake, progressive weakness. No unable to do usual activities. She brought pt into [facility] on Tue 3/29. In ED 3/29, afeb, WBC 6.9, Cr 4.9, Na+ 121  UA small LE, micro WBC 3-5  Admit, started on Ceftriaxone     On 3/30 - afeb. Antibiotics changed to Zosyn + Linezolid    Today 3/31, afeb, WBC 7.0  Feels good.   No specific complaint     Past Medical History:    Past Medical History:   Diagnosis Date    Asthma     Environmental allergies     Inguinal hernia     Prostate cancer [facility])        Past Surgical History:    Past Surgical History:   Procedure Laterality Date    INGUINAL HERNIA REPAIR Bilateral 6/20/2019    LAPAROSCOPIC BILATERAL INGUINAL HERNIA REPAIR WITH MESH performed by Rehan Hurley MD at [address] [place] [address]        Current Medications:     linezolid  600 mg IntraVENous Q12H    piperacillin-tazobactam  3,375 mg IntraVENous Q12H    tamsulosin  0.4 mg Oral Daily    sodium chloride flush  5-40 mL IntraVENous 2 times per day    lidocaine  1 patch TransDERmal Daily       Allergies: Other    Social History:    TOBACCO:    None   ETOH:    Occasional   DRUGS:   None   MARITAL STATUS:   Single   OCCUPATION:         Family History:   No immunodeficiency    REVIEW OF SYSTEMS:    No fever / chills / sweats. No weight loss. No visual change, eye pain, eye discharge. No oral lesion, sore throat, dysphagia. Denies cough / sputum. Denies chest pain, palpitations. Denies n / v / abd pain. No diarrhea. Denies dysuria or change in urinary function. Denies joint swelling or pain. No myalgia, arthralgia. Denies skin changes, itching  Denies focal weakness, sensory change or other neurologic symptom    Denies new / worse depression, psychiatric symptoms    PHYSICAL EXAM:      Vitals:    BP (!) 96/54   Pulse 82   Temp 97.9 °F (36.6 °C) (Oral)   Resp 18   Ht 5' 6\" (1.676 m)   Wt 159 lb 2.8 oz (72.2 kg)   SpO2 95%   BMI 25.69 kg/m²     GENERAL: No apparent distress.   RA  HEENT: Membranes moist, no oral lesion  NECK:  Supple, no lymphadenopathy  LUNGS: Clear b/l, no rales, no dullness  CARDIAC: RRR, no murmur appreciated  ABD:  + BS, soft / NT  EXT:  No rash, no edema, no lesions  NEURO: No focal neurologic findings  PSYCH: Orientation, sensorium, mood normal  LINES:  Peripheral iv    DATA:    Lab Results   Component Value Date    WBC 7.0 03/31/2022    HGB 8.4 (L) 03/31/2022    HCT 25.1 (L) 03/31/2022    MCV 85.3 03/31/2022    PLT 10 (LL) 03/31/2022     Lab Results   Component Value Date    CREATININE 2.7 (H) 03/31/2022     (HH) 03/31/2022     (L) 03/31/2022    K 3.3 (L) 03/31/2022    CL 91 (L) 03/31/2022    CO2 25 03/31/2022       Hepatic Function Panel:   Lab Results   Component Value Date    ALKPHOS 273 03/31/2022    ALT 71 03/31/2022     03/31/2022    PROT 4.6 03/31/2022    BILITOT 1.8 03/31/2022    BILIDIR 1.4 03/31/2022    IBILI 0.4 03/31/2022    LABALBU 1.8 03/31/2022       Micro:  3/29 BC no growth to date  3/29 SARS CoV-2 NAAT neg  3/30 Resp cult - GS -no WBC, no organism    Imaging:   3/29 Abd / pelvic CT  1.  Moderate left hydronephrosis with an obstructing 3 mm calculus at the left UVJ. 2.  Trace left pleural effusion with associated mild airspace consolidation which could represent atelectasis and/or pneumonia. 3/29 Abd u/s   Gallbladder polyps, the largest of which measures 6 x 5 mm. One year follow up ultrasound recommended. Left hydronephrosis, as noted on the prior study. Mildly heterogeneous increased hepatic echogenicity. Findings may reflect mild steatosis. 3/29 CXR   No active airspace disease or effusion   No congestive changes     3/30 MBS  'No laryngeal penetration or aspiration'    3/31 Renal u/s  'Mild to moderate left hydronephrosis, not significantly changed.'      IMPRESSION:      Patient Active Problem List   Diagnosis    Non-recurrent bilateral inguinal hernia without obstruction or gangrene    AQUILINO [facility]) ([facility]    Hypovolemia    Increased anion gap metabolic acidosis    Thrombocytopenia (HCC)    Prostate cancer (HCC)       Hx advanced, metastatic prostate cancer, receiving XRT  Hx urinary retention    Presented with fatigue, loose stool   Hypotension  AQUILINO - Cr down  Severe thrombocytopenia    L nephrolithiasis  Procalcitonin >100    No pneumonia  Poss UTI - no urine ordered from ED, has stones, catheter    RECOMMENDATIONS:    Cont Zosyn  D/c Linezolid  F/u Procalcitonin  Await BM results  Monitor     Medical Decision Making: The following items were considered in medical decision making:  Discussion of patient care with other providers  Reviewed clinical lab tests  Reviewed radiology tests  Reviewed other diagnostic tests/interventions  Microbiology cultures and other micro tests reviewed      Risk of Complications/Morbidity: High   Illness(es)/ Infection present that pose threat to bodily function.    There is potential for severe exacerbation of infection/side effects of treatment.   Therapy requires intensive monitoring for antimicrobial agent toxicity    Discussed with pt, sister - Marla Guzman MD

## 2022-03-31 NOTE — PROGRESS NOTES
Urology Attending Progress Note      Subjective: More awake today. Pain improving in low central back region. No flank pain reported. Vitals:  BP (!) 133/58   Pulse 98   Temp 97.9 °F (36.6 °C) (Oral)   Resp 23   Ht 5' 6\" (1.676 m)   Wt 159 lb 2.8 oz (72.2 kg)   SpO2 90%   BMI 25.69 kg/m²   Temp  Av.2 °F (36.8 °C)  Min: 97.8 °F (36.6 °C)  Max: 99.1 °F (37.3 °C)    Intake/Output Summary (Last 24 hours) at 3/31/2022 0915  Last data filed at 3/31/2022 6072  Gross per 24 hour   Intake 3180.62 ml   Output 1975 ml   Net 1205.62 ml       Exam: Leblanc draining clear    Labs:  WBC:    Lab Results   Component Value Date    WBC 7.0 2022     Hemoglobin/Hematocrit:    Lab Results   Component Value Date    HGB 8.4 2022    HCT 25.1 2022     BMP:    Lab Results   Component Value Date     2022    K 3.3 2022    K 3.6 2022    CL 91 2022    CO2 25 2022     2022    LABALBU 1.8 2022    CREATININE 2.7 2022    CALCIUM 7.7 2022    GFRAA 28 2022    LABGLOM 23 2022     PT/INR:    Lab Results   Component Value Date    PROTIME 11.8 2022    INR 1.04 2022     PTT:    Lab Results   Component Value Date    APTT 30.7 2022   [APTT    Urine Culture: Not sent    Blood Culture: Negative    Antibiotic Therapy:  Zosyn, Linezolid     Impression/Plan: 67 yo M with aggressive PCA sp ADT/XRT admitted with AQUILINO and hyponatremia.      -Pain improving in lower back. Denies flank pain. Leblanc placed yesterday due to retention  -Cr continuing to improve, nephrology following  -BCx negative, UCx not sent  -Continue flomax   -Will obtain SANTI today to check for resolution of hydronephrosis and passage of stone. Call with any questions.     Claire Flower, PA

## 2022-03-31 NOTE — PROCEDURES
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 Consent signed and time out performed. Patient placed in the left lateral decubitus position. Area cleaned and prepped in a sterile fashion with chloraprep. Sterile drape applied. Lidocaine 1% -10ml administered to the subcutaneous tissue and periosteimum of the right iliac crest. Using sterile technique and using an aspirate needle a bone marrow aspirate was performed. A puncture was made with the provided scalpel, then using an Jamshidi needle, a biopsy was taken from the right posterior iliac crest. A sterile bandage was applied. No significant bleeding. Sample sent for histology, flow cytometry, FISH, cytogenetics and molecular studies. Patient tolerated procedure well.      Estimated Blood Loss: less than 5 mL

## 2022-04-01 ENCOUNTER — APPOINTMENT (OUTPATIENT)
Dept: GENERAL RADIOLOGY | Age: 72
DRG: 682 | End: 2022-04-01
Payer: MEDICARE

## 2022-04-01 LAB
ANION GAP SERPL CALCULATED.3IONS-SCNC: 12 MMOL/L (ref 3–16)
ANISOCYTOSIS: ABNORMAL
ANISOCYTOSIS: ABNORMAL
BANDED NEUTROPHILS RELATIVE PERCENT: 1 % (ref 0–7)
BASOPHILS ABSOLUTE: 0 K/UL (ref 0–0.2)
BASOPHILS ABSOLUTE: 0 K/UL (ref 0–0.2)
BASOPHILS RELATIVE PERCENT: 0 %
BASOPHILS RELATIVE PERCENT: 0 %
BLOOD BANK DISPENSE STATUS: NORMAL
BLOOD BANK DISPENSE STATUS: NORMAL
BLOOD BANK PRODUCT CODE: NORMAL
BLOOD BANK PRODUCT CODE: NORMAL
BLOOD SMEAR REVIEW: NORMAL
BPU ID: NORMAL
BPU ID: NORMAL
BUN BLDV-MCNC: 100 MG/DL (ref 7–20)
CALCIUM SERPL-MCNC: 8.1 MG/DL (ref 8.3–10.6)
CHLORIDE BLD-SCNC: 93 MMOL/L (ref 99–110)
CO2: 27 MMOL/L (ref 21–32)
CREAT SERPL-MCNC: 2.1 MG/DL (ref 0.8–1.3)
CULTURE, RESPIRATORY: NORMAL
DESCRIPTION BLOOD BANK: NORMAL
DESCRIPTION BLOOD BANK: NORMAL
EOSINOPHILS ABSOLUTE: 0.1 K/UL (ref 0–0.6)
EOSINOPHILS ABSOLUTE: 0.1 K/UL (ref 0–0.6)
EOSINOPHILS RELATIVE PERCENT: 1 %
EOSINOPHILS RELATIVE PERCENT: 1 %
GFR AFRICAN AMERICAN: 38
GFR NON-AFRICAN AMERICAN: 31
GLUCOSE BLD-MCNC: 129 MG/DL (ref 70–99)
GRAM STAIN RESULT: NORMAL
HCT VFR BLD CALC: 25 % (ref 40.5–52.5)
HCT VFR BLD CALC: 25.7 % (ref 40.5–52.5)
HEMOGLOBIN: 8.6 G/DL (ref 13.5–17.5)
HEMOGLOBIN: 8.7 G/DL (ref 13.5–17.5)
LYMPHOCYTES ABSOLUTE: 0.6 K/UL (ref 1–5.1)
LYMPHOCYTES ABSOLUTE: 0.7 K/UL (ref 1–5.1)
LYMPHOCYTES RELATIVE PERCENT: 6 %
LYMPHOCYTES RELATIVE PERCENT: 8 %
MAGNESIUM: 2.2 MG/DL (ref 1.8–2.4)
MCH RBC QN AUTO: 28.8 PG (ref 26–34)
MCH RBC QN AUTO: 29.2 PG (ref 26–34)
MCHC RBC AUTO-ENTMCNC: 33.8 G/DL (ref 31–36)
MCHC RBC AUTO-ENTMCNC: 34.3 G/DL (ref 31–36)
MCV RBC AUTO: 85 FL (ref 80–100)
MCV RBC AUTO: 85.2 FL (ref 80–100)
METAMYELOCYTES RELATIVE PERCENT: 1 %
METAMYELOCYTES RELATIVE PERCENT: 1 %
MONOCYTES ABSOLUTE: 0.5 K/UL (ref 0–1.3)
MONOCYTES ABSOLUTE: 0.9 K/UL (ref 0–1.3)
MONOCYTES RELATIVE PERCENT: 10 %
MONOCYTES RELATIVE PERCENT: 5 %
MYELOCYTE PERCENT: 1 %
NEUTROPHILS ABSOLUTE: 7.6 K/UL (ref 1.7–7.7)
NEUTROPHILS ABSOLUTE: 8 K/UL (ref 1.7–7.7)
NEUTROPHILS RELATIVE PERCENT: 81 %
NEUTROPHILS RELATIVE PERCENT: 84 %
PDW BLD-RTO: 16.3 % (ref 12.4–15.4)
PDW BLD-RTO: 16.4 % (ref 12.4–15.4)
PLATELET # BLD: 45 K/UL (ref 135–450)
PLATELET # BLD: 61 K/UL (ref 135–450)
PLATELET SLIDE REVIEW: ABNORMAL
PLATELET SLIDE REVIEW: ABNORMAL
PMV BLD AUTO: 10.2 FL (ref 5–10.5)
PMV BLD AUTO: 9.6 FL (ref 5–10.5)
POTASSIUM SERPL-SCNC: 3.5 MMOL/L (ref 3.5–5.1)
PROCALCITONIN: 65.92 NG/ML (ref 0–0.15)
RBC # BLD: 2.94 M/UL (ref 4.2–5.9)
RBC # BLD: 3.02 M/UL (ref 4.2–5.9)
SLIDE REVIEW: ABNORMAL
SODIUM BLD-SCNC: 132 MMOL/L (ref 136–145)
TARGET CELLS: ABNORMAL
TARGET CELLS: ABNORMAL
WBC # BLD: 9.2 K/UL (ref 4–11)
WBC # BLD: 9.3 K/UL (ref 4–11)

## 2022-04-01 PROCEDURE — 97530 THERAPEUTIC ACTIVITIES: CPT

## 2022-04-01 PROCEDURE — 2580000003 HC RX 258: Performed by: STUDENT IN AN ORGANIZED HEALTH CARE EDUCATION/TRAINING PROGRAM

## 2022-04-01 PROCEDURE — 2060000000 HC ICU INTERMEDIATE R&B

## 2022-04-01 PROCEDURE — 74019 RADEX ABDOMEN 2 VIEWS: CPT

## 2022-04-01 PROCEDURE — 6360000002 HC RX W HCPCS: Performed by: INTERNAL MEDICINE

## 2022-04-01 PROCEDURE — 80048 BASIC METABOLIC PNL TOTAL CA: CPT

## 2022-04-01 PROCEDURE — 84145 PROCALCITONIN (PCT): CPT

## 2022-04-01 PROCEDURE — 6360000002 HC RX W HCPCS: Performed by: STUDENT IN AN ORGANIZED HEALTH CARE EDUCATION/TRAINING PROGRAM

## 2022-04-01 PROCEDURE — 36415 COLL VENOUS BLD VENIPUNCTURE: CPT

## 2022-04-01 PROCEDURE — 85025 COMPLETE CBC W/AUTO DIFF WBC: CPT

## 2022-04-01 PROCEDURE — 6370000000 HC RX 637 (ALT 250 FOR IP): Performed by: STUDENT IN AN ORGANIZED HEALTH CARE EDUCATION/TRAINING PROGRAM

## 2022-04-01 PROCEDURE — 36430 TRANSFUSION BLD/BLD COMPNT: CPT

## 2022-04-01 PROCEDURE — 99233 SBSQ HOSP IP/OBS HIGH 50: CPT | Performed by: INTERNAL MEDICINE

## 2022-04-01 PROCEDURE — 71045 X-RAY EXAM CHEST 1 VIEW: CPT

## 2022-04-01 PROCEDURE — 99232 SBSQ HOSP IP/OBS MODERATE 35: CPT | Performed by: INTERNAL MEDICINE

## 2022-04-01 PROCEDURE — 83735 ASSAY OF MAGNESIUM: CPT

## 2022-04-01 RX ORDER — POTASSIUM CHLORIDE 7.45 MG/ML
10 INJECTION INTRAVENOUS
Status: COMPLETED | OUTPATIENT
Start: 2022-04-01 | End: 2022-04-01

## 2022-04-01 RX ORDER — SODIUM CHLORIDE 9 MG/ML
INJECTION, SOLUTION INTRAVENOUS CONTINUOUS
Status: DISCONTINUED | OUTPATIENT
Start: 2022-04-01 | End: 2022-04-01

## 2022-04-01 RX ORDER — POTASSIUM CHLORIDE 7.45 MG/ML
10 INJECTION INTRAVENOUS
Status: DISPENSED | OUTPATIENT
Start: 2022-04-01 | End: 2022-04-01

## 2022-04-01 RX ORDER — MAGNESIUM HYDROXIDE/ALUMINUM HYDROXICE/SIMETHICONE 120; 1200; 1200 MG/30ML; MG/30ML; MG/30ML
30 SUSPENSION ORAL EVERY 6 HOURS PRN
Status: DISCONTINUED | OUTPATIENT
Start: 2022-04-01 | End: 2022-04-04 | Stop reason: HOSPADM

## 2022-04-01 RX ORDER — SODIUM CHLORIDE 9 MG/ML
INJECTION, SOLUTION INTRAVENOUS PRN
Status: DISCONTINUED | OUTPATIENT
Start: 2022-04-01 | End: 2022-04-01

## 2022-04-01 RX ADMIN — PIPERACILLIN AND TAZOBACTAM 3375 MG: 3; .375 INJECTION, POWDER, LYOPHILIZED, FOR SOLUTION INTRAVENOUS at 06:44

## 2022-04-01 RX ADMIN — TAMSULOSIN HYDROCHLORIDE 0.4 MG: 0.4 CAPSULE ORAL at 09:37

## 2022-04-01 RX ADMIN — PIPERACILLIN AND TAZOBACTAM 3375 MG: 3; .375 INJECTION, POWDER, LYOPHILIZED, FOR SOLUTION INTRAVENOUS at 16:30

## 2022-04-01 RX ADMIN — POTASSIUM CHLORIDE 10 MEQ: 10 INJECTION, SOLUTION INTRAVENOUS at 11:44

## 2022-04-01 RX ADMIN — POTASSIUM CHLORIDE 10 MEQ: 10 INJECTION, SOLUTION INTRAVENOUS at 09:46

## 2022-04-01 RX ADMIN — ACETAMINOPHEN 325MG 650 MG: 325 TABLET ORAL at 20:44

## 2022-04-01 RX ADMIN — SODIUM CHLORIDE, PRESERVATIVE FREE 10 ML: 5 INJECTION INTRAVENOUS at 20:46

## 2022-04-01 RX ADMIN — POTASSIUM CHLORIDE 10 MEQ: 10 INJECTION, SOLUTION INTRAVENOUS at 06:42

## 2022-04-01 RX ADMIN — SODIUM CHLORIDE, PRESERVATIVE FREE 10 ML: 5 INJECTION INTRAVENOUS at 09:39

## 2022-04-01 RX ADMIN — SENNOSIDES 8.6 MG: 8.6 TABLET, COATED ORAL at 20:44

## 2022-04-01 RX ADMIN — PIPERACILLIN AND TAZOBACTAM 3375 MG: 3; .375 INJECTION, POWDER, LYOPHILIZED, FOR SOLUTION INTRAVENOUS at 23:09

## 2022-04-01 ASSESSMENT — PAIN SCALES - GENERAL
PAINLEVEL_OUTOF10: 0
PAINLEVEL_OUTOF10: 5

## 2022-04-01 NOTE — PROGRESS NOTES
Urology Attending Progress Note      Subjective: No flank pain. Only complaint is gas pain    Vitals:  BP (!) 124/57   Pulse 81   Temp 98 °F (36.7 °C) (Oral)   Resp 20   Ht 5' 6\" (1.676 m)   Wt 159 lb 2.8 oz (72.2 kg)   SpO2 95%   BMI 25.69 kg/m²   Temp  Av.2 °F (36.8 °C)  Min: 97.7 °F (36.5 °C)  Max: 98.7 °F (37.1 °C)    Intake/Output Summary (Last 24 hours) at 2022 0941  Last data filed at 2022 0644  Gross per 24 hour   Intake 2665 ml   Output 2525 ml   Net 140 ml       Exam: Coates draining clear    Labs:  WBC:    Lab Results   Component Value Date    WBC 9.2 2022     Hemoglobin/Hematocrit:    Lab Results   Component Value Date    HGB 8.6 2022    HCT 25.0 2022     BMP:    Lab Results   Component Value Date     2022    K 3.5 2022    K 3.6 2022    CL 93 2022    CO2 27 2022     2022    LABALBU 1.8 2022    CREATININE 2.1 2022    CALCIUM 8.1 2022    GFRAA 38 2022    LABGLOM 31 2022     PT/INR:    Lab Results   Component Value Date    PROTIME 11.8 2022    INR 1.04 2022     PTT:    Lab Results   Component Value Date    APTT 30.7 2022   [APTT    Urine Culture: Not sent    Blood Culture: Negative    Antibiotic Therapy:  Zosyn, Linezolid     Impression/Plan: 65 yo M with aggressive PCA sp ADT/XRT admitted with AQUILINO and hyponatremia.      -No  complaints, coates draining clear  -Cr improved today to 2.1  -SANTI showing persistent hydronephrosis. Avelina Mccabe likely has not passed  -BCx negative, UCx not sent  -Continue flomax   -Continue with conservative measures regarding stone. If he develops any severe pain or signs of infection please contact us.      CLIF Vargas

## 2022-04-01 NOTE — PROGRESS NOTES
ID Follow-up NOTE    CC:   Hypotension, elevated procalcitonin  Antibiotics: Zosyn    Admit Date: 3/29/2022  Hospital Day: 4    Subjective:     Patient reports he is 'better'  Does c/o poor appetite. Did ate some breakfast.      Objective:     Patient Vitals for the past 8 hrs:   Height   04/01/22 1011 5' 6\" (1.676 m)     I/O last 3 completed shifts: In: 7960 [P.O.:1390; I.V.:2174; Blood:1037]  Out: 5970 [Urine:3525]  No intake/output data recorded. EXAM:  GENERAL: No apparent distress. RA  HEENT: Membranes moist, no oral lesion  NECK:  Supple, no lymphadenopathy  LUNGS: Clear b/l, no rales, no dullness  CARDIAC: RRR, no murmur appreciated  ABD:  + BS, soft / NT  EXT:  No rash, no edema, no lesions  NEURO: No focal neurologic findings  PSYCH: Orientation, sensorium, mood normal  LINES:  Peripheral iv       Data Review:  Lab Results   Component Value Date    WBC 9.2 04/01/2022    HGB 8.6 (L) 04/01/2022    HCT 25.0 (L) 04/01/2022    MCV 85.0 04/01/2022    PLT 45 (L) 04/01/2022     Lab Results   Component Value Date    CREATININE 2.1 (H) 04/01/2022     (HH) 04/01/2022     (L) 04/01/2022    K 3.5 04/01/2022    CL 93 (L) 04/01/2022    CO2 27 04/01/2022       Hepatic Function Panel:   Lab Results   Component Value Date    ALKPHOS 273 03/31/2022    ALT 71 03/31/2022     03/31/2022    PROT 4.6 03/31/2022    BILITOT 1.8 03/31/2022    BILIDIR 1.4 03/31/2022    IBILI 0.4 03/31/2022    LABALBU 1.8 03/31/2022       Micro:  3/29 BC no growth to date  3/29 SARS CoV-2 NAAT neg  3/30 Resp cult - GS -no WBC, no organism; cult - normal resp cee     Imaging:   3/29 Abd / pelvic CT  1.  Moderate left hydronephrosis with an obstructing 3 mm calculus at the left UVJ. 2.  Trace left pleural effusion with associated mild airspace consolidation which could represent atelectasis and/or pneumonia.      3/29 Abd u/s   Gallbladder polyps, the largest of which measures 6 x 5 mm.  One year follow up ultrasound recommended. Left hydronephrosis, as noted on the prior study. Mildly heterogeneous increased hepatic echogenicity. Findings may reflect mild steatosis.      3/29 CXR   No active airspace disease or effusion   No congestive changes      3/30 MBS  'No laryngeal penetration or aspiration'     3/31 Renal u/s  'Mild to moderate left hydronephrosis, not significantly changed.'    4/1 CXR 'left basilar atelectasis or pneumonia'      Scheduled Meds:   polyethylene glycol  17 g Oral Daily    senna  1 tablet Oral Nightly    piperacillin-tazobactam  3,375 mg IntraVENous Q12H    tamsulosin  0.4 mg Oral Daily    sodium chloride flush  5-40 mL IntraVENous 2 times per day    lidocaine  1 patch TransDERmal Daily       Continuous Infusions:   sodium chloride      sodium chloride      sodium chloride         PRN Meds:  aluminum & magnesium hydroxide-simethicone, sodium chloride, sodium chloride flush, sodium chloride, ondansetron **OR** ondansetron, acetaminophen **OR** acetaminophen, sodium chloride      Assessment:     Hx advanced, metastatic prostate cancer, receiving XRT  Hx urinary retention     Presented with fatigue, loose stool   Hypotension - BP up today  AQUILINO - Cr down further today  Severe thrombocytopenia     L nephrolithiasis  Procalcitonin >100, repeat 65 (4/1)     No pneumonia  Poss UTI - no urine ordered from ED, has stones, catheter      Plan:     Cont Zosyn  Await BM results  Monitor    At discharge, give Levofloxacin 500 mg po daily x 5 days    Medical Decision Making:   The following items were considered in medical decision making:  Discussion of patient care with other providers  Reviewed clinical lab tests  Reviewed radiology tests  Reviewed other diagnostic tests/interventions  Independent review of radiologic images  Microbiology cultures and other micro tests reviewed      Discussed with pt, his brother - Renee aVldes  Call with ID issues over weekend  Frances Armenta MD

## 2022-04-01 NOTE — PROGRESS NOTES
Occupational Therapy  Daily Treatment Note  Patient Name: Kam Henderson  MRN: [id number]     Assessment: Pt demonstrated good participation in session. Feels LEs are very weak, so worked on transfer, standing balance, and sit to stands to strengthen for functional mobility. Pt continues to be unsafe to return home without close 24 hr A and would benefit from continued inpt OT/PT to increase independence at d/c. If pt does d/c home, recommend 24 hr A and home OT/PT. Discharge Recommendations: Kam Henderson scored a 17/24 on the AM-PAC ADL Inpatient form. Current research shows that an AM-PAC score of 17 or less is typically not associated with a discharge to the patient's home setting. Based on the patient's AM-PAC score and their current ADL deficits, it is recommended that the patient have 3-5 sessions per week of Occupational Therapy at d/c to increase the patient's independence. Please see assessment section for further patient specific details. Equipment Needs:  No    Chart Reviewed: Yes     Other position/activity restrictions: up with assist   Additional Pertinent Hx: Pt has prostate cancer with mets to pelvis, undergoing radiation therapy. He came to ED with c/o hypotension, diarrhea, fatigue-dx of hypovalemia, AQUILINO, thrombocytopenia-CXR=neg, abd/pelvis CT=mod L hydronephrosis with obstructing calculus, atelectasis and/or pneumonia, abd US=gallbladder polyps, renal US=L hydronephrosis, MBS=no laryngeal penetration or aspiration; PMHx: prostate CA, asthma, hernia repair, urinary retention    Diagnosis: Pt has prostate cancer with mets to pelvis, undergoing radiation therapy.   He came to ED with c/o hypotension, diarrhea, fatigue-dx of hypovalemia, AQUILINO, thrombocytopenia-CXR=neg, abd/pelvis CT=mod L hydronephrosis with obstructing calculus, atelectasis and/or pneumonia, abd US=gallbladder polyps, renal US=L hydronephrosis, MBS=no laryngeal penetration or aspiration  Treatment Diagnosis: impaired ADLs and mobility    Subjective: Pt in bed on entry. Lunch tray in front of him, but pt calling the nurse because he thought he was NPO (RN confirmed, lunch tray removed). Pt pleasant and agreeable with therapy. \"I feel weak. \"     Pain: Denied pain at rest, groaning and c/o back pain with bed mobility    Objective:    Cognition/Orientation: alert, oriented to self, place, partial situation; requires step by step commands for task completion, converses appropriately, good insight into deficits and need for assist    Bed mobility   Supine to sit: Mod assist, mod cues (HOB raised, rail used)  Scooting: SBA   Sitting: Independent    Functional Mobility   Sit to Stand: CGA, min cues (from bed x1, chair x6; consistently needed cues for most sit to stands)  Stand to Sit: CGA, min cues  Bed to Chair Transfer: CGA, mod cues, stand step with walker  Stance: CGA static stance at walker, min assist static stance without walker (posterior lean), CGA static marching at walker  Sit to stands: 5 consecutive from chair with CGA  Time in stance: 1 min + 3 min    Activity Tolerance: Tolerated session well    Patient Education: role of OT, transfers, progress - verb understanding    Safety Devices in Place: left in chair, alarm on, needs in reach, RN aware      Goals:  Short term goals  Time Frame for Short term goals: discharge  Short term goal 1: pt to transfer to commode with CGA - Met 4/1; Transfer to/from chairs and commode with spvn - Not met  Short term goal 2: pt to don hospital pants vs brief with mod A - Not met  Short term goal 3: pt to stand for 3-6 minutes with CGA/SBA while doing functional tasks - Not met  Short term goal 4: pt will tolerate functional mobility to/from bathroom for toileting or grooming - Not met         Plan:      Times per week: 2-5        If patient is discharged prior to next treatment, this note will serve as the discharge summary.     Therapy Time   Individual Concurrent Group Co-treatment   Time In 9117 Time Out 1242         Minutes 39           Timed Code Treatment Minutes: 39  Total Treatment Time: [facility],

## 2022-04-01 NOTE — FLOWSHEET NOTE
04/01/22 1401   Encounter Summary   Services provided to: Patient   Referral/Consult From: Rounding   Continue Visiting   (es 4/1)   Complexity of Encounter Moderate   Length of Encounter 15 minutes   Routine   Type Initial   Assessment Calm; Approachable   Intervention Active listening;Explored feelings, thoughts, concerns;Explored coping resources;Prayer;Contacted support as requested per patient/family request;Discussed belief system/P[employer]alism practices/deon   Who? fr connell   Why? communion request   At Request Of patient   Outcome Engaged in conversation;Receptive

## 2022-04-01 NOTE — PROGRESS NOTES
4 Eyes Admission Assessment     I agree as the admission nurse that 2 RN's have performed a thorough Head to Toe Skin Assessment on the patient. ALL assessment sites listed below have been assessed on admission. Areas assessed by both nurses: Nell Pedro  [x]   Head, Face, and Ears   [x]   Shoulders, Back, and Chest  [x]   Arms, Elbows, and Hands   [x]   Coccyx, Sacrum, and Ischium  [x]   Legs, Feet, and Heels        Does the Patient have Skin Breakdown?   No         Brian Prevention initiated:  Yes   Wound Care Orders initiated:  Yes      [address]  nurse consulted for Pressure Injury (Stage 3,4, Unstageable, DTI, NWPT, and Complex wounds) or Brian score 18 or lower:  Yes      Nurse 1 eSignature: Electronically signed by Shereen Juarez RN on 4/1/22 at 7:28 AM EDT    **SHARE this note so that the co-signing nurse is able to place an eSignature**    Nurse 2 eSignature: {Esignature:[id number]}

## 2022-04-01 NOTE — PROGRESS NOTES
FOLLOW UP NOTE    Diagnosis:    Past Medical History:   Diagnosis Date    Asthma     Environmental allergies     Inguinal hernia     Prostate cancer ([facility] [address]           History:  Perfecto Lynne is  66 y.o., he Remains hospitalized for acute kidney injury, anemia, thrombocytopenia and weakness. Patient's kidney function continues to improve. He is now alert and oriented. Platelet count up to 45 and hemoglobin stable at 8.6. Bone marrow biopsy performed but results pending. Patient remains on Zosyn. Patient remains constipated with no bowel movement since Sunday. [place] He has now been started on stool softeners. Overall symptoms improving from previous assessments this week. Past Medical History, Surgical History, Social History, Family History and Medications are reviewed in detail and updated in his chart. ROS:    Constitutional:  No weight loss, No fever, No chills, No night sweats. Moderate fatigue. ENT / Mouth:  No dysphagia, No hoarseness, No oral ulcers. No sore throat, No tinnitus, Normal hearing. Cardiovascular:  No chest pain, No palpitations, No syncope, No upper extremity or lower extremity edema, No calf discomfort. Respiratory:  No cough. No hemoptysis, No wheezing, No dyspnea. Gastrointestinal:  No abdominal pain, No nausea, No vomiting, No constipation, No diarrhea, No melena, No dyspepsia. Urinary:  No dysuria, No hematuria, No urinary incontinence. Musculoskeletal: Muscle strength improving. BLE improved 4/5, BUE 5/5    Skin:  No rash, No nodules, No pruritus, No lesions. Neurologic:  No confusion, No seizures, No syncope, No tremor, No speech change, No headache, No abnormal gait, No sensory changes, No focal weakness. Psychiatric:  No depression, No anxiety, Concentration normal.    Endocrine:  No hot flashes, No thyroid symptoms. Hematologic:  No epistaxis, No petechiae, No ecchymosis. Lymphatic:  No lymphadenopathy, No lymphedema.           Physical Exam:  BP (!) 124/57   Pulse 81   Temp 98 °F (36.7 °C) (Oral)   Resp 20   Ht 5' 6\" (1.676 m)   Wt 159 lb 2.8 oz (72.2 kg)   SpO2 95%   BMI 25.69 kg/m²     GENERAL: No acute distress. Alert & oriented x3. NECK: No thyromegaly or masses. LYMPHATICS:  No cervical, supraclavicular, infraclavicular or axillary lymphadenopathy. LUNGS:  Clear to auscultation bilaterally. No rales or wheezes. Good respiratory effort. HEART:  Regular rate and rhythm. ABDOMEN: Soft, ND, NT. No masses or hepatosplenomegaly. MUSCULOSKELETAL:  No tenderness to palpation of the spine or pelvis. Full strength bilaterally. EXTREMETIES:   Muscle strength BLE improved 4/5, BUE 5/5      Radiology:    EXAM: CT ABDOMEN PELVIS WO CONTRAST       INDICATION: hydronephrosis? AQUILINO       COMPARISON: January 11, 2022       TECHNIQUE: Standard per department protocol without contrast. Up-to-date CT dose lowering techniques were utilized.       FINDINGS:       Trace left pleural effusion with mild left lower lobe airspace consolidation. Small pericardial effusion.       The liver, gallbladder, pancreas, spleen, and adrenal glands are normal. 3 mm calculus at the left UVJ with moderate left hydronephrosis and hydroureter. There is left-sided perinephric stranding. No suspicious renal mass. No additional renal calculi. The bladder is normal. The prostate is mildly enlarged.       The small and large bowel are normal in caliber without focal bowel wall thickening. Mild colonic diverticulosis is noted. The appendix is not definitely identified. Small hiatal hernia. No abnormality of the abdominal wall. No intra-abdominal fluid    collections.       Mild vascular calcifications. No aneurysm. No lymphadenopathy. No suspicious osseous lesions.           Impression       1.  Moderate left hydronephrosis with an obstructing 3 mm calculus at the left UVJ.    2.  Trace left pleural effusion with associated mild airspace consolidation which could represent atelectasis and/or pneumonia. Assessment and Plan:  Shell Johns is a 66 y.o. patient with metastatic prostate cancer. Acute kidney injury slowly resolving. Anemia stable and platelet count has improved over the last 24 hours. Bone marrow biopsy is pending. Bilateral lower extremity weakness and back pain has also improved. If he continues to improve over the weekend would consider resuming radiation therapy to the prostate and pelvis next week. Will reevaluate patient on Monday. Please note this document has been produced using speech recognition software and may contain errors related to that system including errors in grammar, punctuation, and spelling, as well as words and phrases that may be inappropriate. If there are any questions or concerns please feel free to contact the dictating provider for clarification. A total of 25 minutes was spent on today's patient encounter. If applicable, non-patient-facing activities:     ( x  )   Preparing to see the patient and reviewing records     (   )   Individual interpretation of results      (   )   Discussion or coordination of care with other health care professionals     (   )   Ordering of unique tests, medications, or procedures     ( x  )   Documentation within the EHR       ENID Wakefield - CNP      This is was completed in conjunction with Dr. Noah Gotti.

## 2022-04-01 NOTE — PROGRESS NOTES
Pt returned from testing via stretcher. Pt assisted to bed without difficulty. Will report off to the oncoming nurse.

## 2022-04-01 NOTE — PROGRESS NOTES
4 Eyes Skin Assessment     The patient is being assess for   Shift Handoff    I agree that 2 RN's have performed a thorough Head to Toe Skin Assessment on the patient. ALL assessment sites listed below have been assessed. Areas assessed for pressure by both nurses:   [x]   Head, Face, and Ears   [x]   Shoulders, Back, and Chest, Abdomen  [x]   Arms, Elbows, and Hands   [x]   Coccyx, Sacrum, and Ischium  [x]   Legs, Feet, and Heels        Skin Assessed Under all Medical Devices by both nurses:  na              All Mepilex Borders were peeled back and area peeked at by both nurses:  No: na  Please list where Mepilex Borders are located:  na               **SHARE this note so that the co-signing nurse is able to place an eSignature**    Co-signer eSignature: Electronically signed by Eulas Hodgkin, RN on 4/1/22 at 7:38 PM EDT    Does the Patient have Skin Breakdown related to pressure?   No              Brian Prevention initiated:  Yes   Wound Care Orders initiated:  NA      [facility] nurse consulted for Pressure Injury (Stage 3,4, Unstageable, DTI, NWPT, Complex wounds)and New or Established Ostomies:  NA      Primary Nurse eSignature: Electronically signed by Carloz Bearden RN on 4/2/22 at 1:45 AM EDT

## 2022-04-01 NOTE — CONSULTS
Comprehensive Nutrition Assessment    RECOMMENDATIONS:  1. PO Diet: Continue NPO per MD  2. ONS: Start Ensure Enlive BID once diet advanced    NUTRITION ASSESSMENT:   Nutritional summary & status: Consult for poor appetite/intake PTA. Pt received radiation Friday which started episodes of emesis following meals for 2 days. Pt reports eating <50% of his meals since Saturday. Pt endorses early satiety. Pt eating breakfast during RD visit w/ oatmeal, milk, and water on tray. RD encouraged water intake after meals to ensure increased kcal intake. Pt agreeable to ONS to promote intake. RD encouraged small, frequent meals/snacks and drinking ONS between meals. Will continue to monitor per [facility].  Admission/PMH: Admission: hypvolemia, AQUILINO; PMH: prostate cancer- radiation tx    MALNUTRITION ASSESSMENT  Context of Malnutrition: Acute Illness (on chronic)   Malnutrition Status:  At risk for malnutrition (Comment)  Findings of the 6 clinical characteristics of malnutrition (Minimum of 2 out of 6 clinical characteristics is required to make the diagnosis of moderate or severe Protein Calorie Malnutrition based on AND/ASPEN Guidelines):  Energy Intake: Less than/equal to 50% of estimated energy requirements    Energy Intake Time: Greater than or equal to 5 days    Weight Loss %: No significant loss   Weight loss Time: No significant      NUTRITION DIAGNOSIS   Inadequate oral intake related to early satiety (N/V) as evidenced by intake 26-50%    NUTRITION INTERVENTION  Food and/or Nutrient Delivery:  Continue Current Diet,Start Oral Nutrition Supplement  Nutrition Education/Counseling:  No recommendation at this time   Goals:  Pt to consistently consume >50% of meals and ONS through adm       Nutrition Monitoring and Evaluation:   Food/Nutrient Intake Outcomes:  Food and Nutrient Intake,Supplement Intake  Physical Signs/Symptoms Outcomes:  Biochemical Data,Weight     OBJECTIVE DATA: Significant to nutrition assessment  · Nutrition-Focused Physical Findings: no bm noted; no emesis since adm; +2 BLE edema  · Labs: Reviewed; Na 132;   · Meds: Reviewed; glyoclax  · Wounds: None       CURRENT NUTRITION THERAPIES  Diet NPO     Diet NPO  PO Intake: 26-50%   PO Supplement Intake:None Ordered  Additional Sources of Calories/IVF:n/a     ANTHROPOMETRICS  Current Height: 5' 6\" (167.6 cm)  Current Weight: 159 lb 2.8 oz (72.2 kg)    Admission weight: 189 lb 4 oz (85.8 kg)  Ideal Body Weight (IBW): 142 lbs  (65 kg)    Usual Bodyweight 150 lb (68 kg)   Weight Changes no wt loss noted       BMI: 25.7    Wt Readings from Last 50 Encounters:   03/31/22 159 lb 2.8 oz (72.2 kg)   02/21/22 157 lb (71.2 kg)   02/12/22 150 lb (68 kg)   01/20/22 150 lb (68 kg)   12/20/21 155 lb (70.3 kg)   11/22/21 151 lb 1.6 oz (68.5 kg)       COMPARATIVE STANDARDS  Energy (kcal):  4190-0052 (25-30)     Protein (g):  76-89 (1.2-1.4)       Fluid (ml/day):  2618-9427    The patient will still be monitored per nutrition standards of care. Consult dietitian if nutrition interventions essential to patient care is needed.      Nelsy Garcia [address], [address]  [place]:  [phone]  Office:  [phone]

## 2022-04-01 NOTE — PROGRESS NOTES
Pt very hypotensive this evening with bp of 83/44 map of 56 and repeat bp of 86/42 map of 56. Other vitals are normal. He is not asymptomatic. He has been running hypotensive but not this hypotensive since admission. MD Ricky Crespo notified. Ordered to restart previous fluids sodium bicarb 100meq in .45% NS @ 50ml/hr. Currently waiting for pharmacy to send. BP cycling q5min. BP not recovering at all last bp was 77/48 map of 58 repeat bp of 81/47 map of 58. MD Colin Berg notified. Ordered to give 0.5 mg atropine once. RN questioned atropine order due to HR being normal. Ordered to give 250 bolus @ 124ML/HR. Hr currently at 83bpm. Atropine not given. MD mistaken map for HR. HR currently within normal range. Ordered to check bp manually. Manual bp of 80/45 taken. MD aware. No new orders at this time. Nurse at bedside, bp still cycling q5min, Charge RN notified as well. Bolus complete. BP recovered. BP as of now is 107/62 Map of 75. MD Colin Berg notified. BP now cycling q30min.

## 2022-04-01 NOTE — PLAN OF CARE
Problem: Nutritional:  Goal: Ability to tolerate tube feedings without aspirating will improve  Description: Ability to tolerate tube feedings without aspirating will improve  4/1/2022 0551 by John Duvall RN  Outcome: Ongoing     Nutrition Problem #1: Inadequate oral intake  Intervention: Food and/or Nutrient Delivery: Continue Current Diet,Start Oral Nutrition Supplement  Nutritional Goals: Pt to consistently consume >50% of meals and ONS through adm

## 2022-04-01 NOTE — CONSULTS
Consult Note      Inga Malin  [birth year]    Consultant:   Reason for Consult: Dysphagia  Requesting Physician:  Dr. Hobson Freddy:  Hypotension, diarrhea, fatigue    History Obtained From:  patient    HISTORY OF PRESENT ILLNESS:                The patient is a 66 y.o. male with significant past medical history of prostate cancer with metastasis to the pelvis, asthma, urinary retention who presents with chief complaint of hypertension, diarrhea, fatigue. Patient has been undergoing radiation therapy for his prostate cancer. However, he did not undergo chemotherapy for prostate cancer during his last appointment a few days before his presentation to the hospital because he was hypotensive to 70s over 40s. A week before his presentation to ED, patient endorsed nonbloody diarrhea, intermittent cramping abdominal pain, generalized weakness, and nausea and vomiting which resolved. He also endorses increased weakness and decreased p.o. intake due to general malaise and nausea and vomiting. His weakness has been getting worse. Patient started radiation therapy on March 21 and received 1 dose of hormonal therapy. He denies having fever, chills, chest pain, shortness of breath, dysuria, hematuria, melena, hematochezia, numbness, weakness, lower extremity edema, bowel or bladder incontinence. In the ED, temperature 98 °F, respiratory rate 18, heart rate 82, BP 80/53, SPO2 99% on room air. Labs were significant for sodium 121, creatinine 4.9, anion gap 21, , platelet 11. Patient was admitted to the hospital and started on IV fluid. Nephrology was consulted for AQUILINO. CT of abdomen and pelvis showed L renal stone 3mm, obstructing with hydronephrosis. Renal US shows unchanged hydronephrosis. Urology was consulted and recommended conservative measures regarding stone. Patient was also found to have elevated liver enzymes, , ALT 71, , bilirubin 1.8, direct bilirubin 1.4. Abdominal ultrasound showed Gallbladder polyps, the largest of which measures 6 x 5 mm, Left hydronephrosis, Mildly heterogeneous increased hepatic echogenicity. Patient is on Zosyn for possible pneumonia. Patient mentions that he has decreased oral intake. Reports that it takes him a long time to finish his meal. He also reports that his last BM was last Friday. He is able to pass gas, but cannot have BM. He also endorses nausea when he eats a little more. He denies having chest pain, SOB, cough, abdominal pain, diarrhea.     Past Medical History:        Diagnosis Date    Asthma     Environmental allergies     Inguinal hernia     Prostate cancer [facility])      Past Surgical History:        Procedure Laterality Date    INGUINAL HERNIA REPAIR Bilateral 6/20/2019    LAPAROSCOPIC BILATERAL INGUINAL HERNIA REPAIR WITH MESH performed by Adria Vela MD at [address] [place] [address]      Medications at Home:  Medications Prior to Admission: acetaminophen (TYLENOL) 500 MG tablet, Take 500 mg by mouth every 6 hours as needed for Pain  tamsulosin (FLOMAX) 0.4 MG capsule, TAKE 1 CAPSULE BY MOUTH EVERY DAY  lisinopril-hydroCHLOROthiazide (PRINZIDE;ZESTORETIC) 20-12.5 MG per tablet, Take 1 tablet by mouth daily  Current Medications:    Current Facility-Administered Medications: aluminum & magnesium hydroxide-simethicone (MAALOX) 200-200-20 MG/5ML suspension 30 mL, 30 mL, Oral, Q6H PRN  0.9 % sodium chloride infusion, , IntraVENous, PRN  polyethylene glycol (GLYCOLAX) packet 17 g, 17 g, Oral, Daily  senna (SENOKOT) tablet 8.6 mg, 1 tablet, Oral, Nightly  [COMPLETED] piperacillin-tazobactam (ZOSYN) 4,500 mg in dextrose 5 % 100 mL IVPB (mini-bag), 4,500 mg, IntraVENous, Once **FOLLOWED BY** piperacillin-tazobactam (ZOSYN) 3,375 mg in dextrose 5 % 50 mL IVPB (mini-bag), 3,375 mg, IntraVENous, Q12H  tamsulosin (FLOMAX) capsule 0.4 mg, 0.4 mg, Oral, Daily  sodium chloride flush 0.9 % injection 5-40 mL, 5-40 mL, IntraVENous, 2 times per day  sodium chloride flush 0.9 % injection 5-40 mL, 5-40 mL, IntraVENous, PRN  0.9 % sodium chloride infusion, , IntraVENous, PRN  ondansetron (ZOFRAN-ODT) disintegrating tablet 4 mg, 4 mg, Oral, Q8H PRN **OR** ondansetron (ZOFRAN) injection 4 mg, 4 mg, IntraVENous, Q6H PRN  acetaminophen (TYLENOL) tablet 650 mg, 650 mg, Oral, Q6H PRN **OR** acetaminophen (TYLENOL) suppository 650 mg, 650 mg, Rectal, Q6H PRN  lidocaine 4 % external patch 1 patch, 1 patch, TransDERmal, Daily  0.9 % sodium chloride infusion, , IntraVENous, PRN  Allergies: Other        Family History:   History reviewed. No pertinent family history. REVIEW OF SYSTEMS:    A comprehensive 12 point review of systems is negative except as documented above.      PHYSICAL EXAM:      Vitals:    BP (!) 124/57   Pulse 81   Temp 98 °F (36.7 °C) (Oral)   Resp 20   Ht 5' 6\" (1.676 m)   Wt 159 lb 2.8 oz (72.2 kg)   SpO2 95%   BMI 25.69 kg/m²     General: No acute distress  HEENT: PERRL, EOMI, oral mucosa moist and intact, no sclericterus  Neck: no thyromegaly  Lymphatic: no cervical or supraclavicular lymphadenopathy  Heart: no m/r/g; +s1/s2 rrr  Lungs: CTA bilaterally  Abdomen: Soft, non tender, +BM, no gaurding or rebound tenderness, No hepatosplenomegaly,   Extremities: 2+pulses, no edema  Skin: no rashes or lesions  Neuro: a&o x 3; no gross deficit  DATA:    Recent Labs     03/31/22  0325 03/31/22  2159 04/01/22  0411   WBC 7.0 7.6 9.2   HGB 8.4* 8.6* 8.6*   HCT 25.1* 25.1* 25.0*   MCV 85.3 86.2 85.0   PLT 10* 25* 45*     Recent Labs     03/30/22  0320 03/31/22  0325 03/31/22  1010 04/01/22  0411   * 128*  --  132*   K 3.3* 3.3*  --  3.5   CL 92* 91*  --  93*   CO2 19* 25  --  27   PHOS  --   --  2.5  --    * 119*  --  100*   CREATININE 3.7* 2.7*  --  2.1*     Recent Labs     03/29/22  1135 03/30/22  0320 03/31/22  0325   * 96* 135*   ALT 78* 58* 71*   BILIDIR  --  1.2* 1.4*   BILITOT 1.3* 1.7* 1.8*   ALKPHOS 226* 223* 273*     Recent Labs     03/29/22  1135   LIPASE 28.0     Recent Labs     03/29/22  1135 03/30/22  0320 03/31/22  0325   PROT 5.7* 5.2* 4.6*   INR 1.04  --   --      No results for input(s): PTT in the last 72 hours. No results for input(s): OCCULTBLD in the last 72 hours. Imaging:   CT abdomen and pelvis: Moderate left hydronephrosis with an obstructing 3 mm calculus at the left UVJ. Trace left pleural effusion with associated mild airspace consolidation which could represent atelectasis and/or pneumonia. Renal ultrasound: Gallbladder polyps, the largest of which measures 6 x 5 mm. One year follow up ultrasound recommended. Left hydronephrosis, as noted on the prior study. Mildly heterogeneous increased hepatic echogenicity. Findings may reflect mild steatosis. Abdominal ultrasound: Gallbladder polyps, the largest of which measures 6 x 5 mm. One year follow up ultrasound recommended. Left hydronephrosis, as noted on the prior study. Mildly heterogeneous increased hepatic echogenicity. Findings may reflect mild steatosis. FL MODIFIED BARIUM SWALLOW W VIDEO: No laryngeal penetration or aspiration. Previous endoscopy:     IMPRESSION/RECOMMENDATIONS:      Patient presented with significant past medical history of prostate cancer with metastasis to the pelvis, asthma, urinary retention who presents with chief complaint of hypertension, diarrhea, fatigue. He was found to have AQUILINO and L renal stone 3mm, obstructing with hydronephrosis. - Patient was also started on antibiotics for possible pneumonia. - Patient endorses decrease oral intake and nausea after eating a little more food. Denies having cough. - Modified barium swallow showed no penetration or aspiration.  - Mentions that his last BM was a week ago. - Patient is on glycolax and senna for constipation.   - Get KUB  - Will continue to monitor. Thank you for allowing me to participate in [facility].        Patient discussed with attending, Dr. Tracy June MD.    Markus Mack MD  PGY-2

## 2022-04-01 NOTE — PLAN OF CARE
Problem: Falls - Risk of:  Goal: Will remain free from falls  Description: Will remain free from falls  Outcome: Ongoing  Note: . Pt is a High fall risk. See Mearl Jasso Fall Score and ABCDS Injury Risk assessments.   + Screening for Orthostasis and/or + High Fall Risk per ZHANG/ABCDS: Explained fall risk precautions to pt and family and rationale behind their use to keep the patient safe. Pt bed is in low position, side rails up, call light and belongings are in reach. Fall wristband applied and present on pts wrist.  Bed alarm on. Pt encouraged to call for assistance. Will continue with hourly rounds for PO intake, pain needs, toileting and repositioning as needed.         Problem: Safety:  Goal: Ability to chew and swallow food without choking will improve  Description: Ability to chew and swallow food without choking will improve  Outcome: Ongoing     Problem: Pain:  Goal: Pain level will decrease  Description: Pain level will decrease  Outcome: Ongoing

## 2022-04-01 NOTE — PROGRESS NOTES
Progress Note    Admit Date: 3/29/2022  Day: 4  Diet: ADULT DIET; Dysphagia - Soft and Bite Sized    CC: Weakness, Hypotension    Interval history:   MAP overnight low at 56, did not respond to bicarb gtt 50 ml/hr IVF, but did respond to 250 cc bolus. Now off IVF  Seen and assessed at bedside  Patient is alert this morning and has improved orientation x3  Strength and BP are improved, creatinine and platelets are improving  Procal 66 down from >100  Catheter draining urine  Patient denies fevers, chills, SOB, chest or abdominal pain, N/V, diarrhea. Had chronic back pain over night, improved with Tylenol  No BM during admission    HPI:   Jan Israel is a 66year old Male with a PmHx of prostate cancer with metastasis to the pelvis, asthma, lipoma resection, urinary retention, hernia repair who present to the ED with a chief complaint of hypotension, diarrhea, fatigue. Currently undergoing radiation therapy, but no chemotherapy for prostate cancer and at his last appointment a few days ago he was hypotensive to 70's/40's which improved after a 1L bolus. Last weekend he endorsed non-bloody diarrhea, intermittent cramping abdominal pain, generalized weakness, and N/V which resolved by Sunday. Family endorses increased weakness and decreased PO intake due to general malaise and N/V. He has increasing weakness for the last few weeks but was still able to walk and drive before the weekend, but has worsened since. He just started radiation therapy on 3/21 and has received one dose of hormonal therapy. Not on chemo and no plans for surgery. Does endorse some burning and discomfort upon urination.  He was able a weak ago to drive himself home and now he is substantially more weak.      He denies fever, chills, chest pain, shortness of breath, dysuria, hematuria, melena, hematochezia, numbness, unilateral weakness, lower extremity weakness, bowel or bladder incontinence.     In the ED, he is hyponatremic to 121, creatinine 4.9, anion gap of 21, LA 1.2, BNP elevated to 7944 (last echo 11/2021 w/ EF 50% and indeterminate diastolic), , platelets 11, VBG 0.79/96.4/96. No acute changes to chest XR. Medications:     Scheduled Meds:   potassium chloride  10 mEq IntraVENous Q1H    polyethylene glycol  17 g Oral Daily    senna  1 tablet Oral Nightly    piperacillin-tazobactam  3,375 mg IntraVENous Q12H    tamsulosin  0.4 mg Oral Daily    sodium chloride flush  5-40 mL IntraVENous 2 times per day    lidocaine  1 patch TransDERmal Daily     Continuous Infusions:   sodium chloride      sodium chloride      sodium chloride       PRN Meds:sodium chloride, sodium chloride flush, sodium chloride, ondansetron **OR** ondansetron, acetaminophen **OR** acetaminophen, sodium chloride    Objective:   Vitals:   T-max:  Patient Vitals for the past 8 hrs:   BP Temp Temp src Pulse Resp SpO2   04/01/22 0336 -- -- -- 81 20 --   04/01/22 0330 (!) 124/57 98 °F (36.7 °C) Oral 81 17 95 %   04/01/22 0306 -- -- -- 80 21 --   04/01/22 0241 -- -- -- 81 19 --   04/01/22 0236 -- -- -- 81 21 --   04/01/22 0231 (!) 123/58 -- -- 76 13 --   04/01/22 0226 -- -- -- 84 21 --   04/01/22 0221 -- -- -- 82 19 --   04/01/22 0216 -- -- -- 81 21 --   04/01/22 0211 -- -- -- 81 13 --   04/01/22 0206 -- -- -- 84 15 --   04/01/22 0201 113/60 -- -- 83 16 --   04/01/22 0156 -- -- -- 80 19 --   04/01/22 0151 -- -- -- 80 22 --   04/01/22 0146 -- -- -- 80 18 --   04/01/22 0141 -- -- -- 80 21 --   04/01/22 0136 -- -- -- 80 18 --   04/01/22 0131 122/70 -- -- 78 20 --   04/01/22 0126 -- -- -- 81 18 --       Intake/Output Summary (Last 24 hours) at 4/1/2022 0923  Last data filed at 4/1/2022 7608  Gross per 24 hour   Intake 2665 ml   Output 2525 ml   Net 140 ml       Review of Systems  A full 10 point ROS was performed with pertinent + and - listed above    Physical Exam  Constitutional:       General: He is not in acute distress. Appearance: He is ill-appearing.    HENT: Head: Normocephalic. Right Ear: External ear normal.      Left Ear: External ear normal.      Nose: Nose normal.     Pharynx: Oropharynx is clear. No oropharyngeal exudate. Eyes:      Extraocular Movements: Extraocular movements intact. Conjunctiva/sclera: Conjunctivae normal.      Pupils: Pupils are equal, round, and reactive to light. Cardiovascular:      Rate and Rhythm: Normal rate and regular rhythm. Pulses: Normal pulses. Heart sounds: No friction rub. No gallop. + murmur heard best at the SANTI border  Pulmonary:      Effort: Pulmonary effort is normal. No respiratory distress. Breath sounds: No stridor. No wheezing, rhonchi. Slight crackles at lung bases with decreased breath sounds. Abdominal:      General: There is no distension     Palpations: Abdomen is soft. Tenderness: There is no abdominal tenderness. There is no right CVA tenderness, left CVA tenderness, guarding or rebound. Musculoskeletal:         General: No deformity. Now has bilateral LE swelling, 2+ pitting edema  Skin:     General: Skin is warm. Coloration: Skin is not jaundiced. Findings: No bruising, erythema or lesion. Neurological:      Mental Status: Alert and oriented x3     Cranial Nerves: No cranial nerve deficit. Motor: Weakness present. Comments: Weakness most prominent in the b/l LE 4/5. Full strength in b/l UE. Difficulty leaning forward.  Strength has improved    LABS:    CBC:   Recent Labs     03/31/22 0325 03/31/22  2159 04/01/22  0411   WBC 7.0 7.6 9.2   HGB 8.4* 8.6* 8.6*   HCT 25.1* 25.1* 25.0*   PLT 10* 25* 45*   MCV 85.3 86.2 85.0     Renal:    Recent Labs     03/30/22  0320 03/31/22  0325 03/31/22  1010 04/01/22  0411   * 128*  --  132*   K 3.3* 3.3*  --  3.5   CL 92* 91*  --  93*   CO2 19* 25  --  27   * 119*  --  100*   CREATININE 3.7* 2.7*  --  2.1*   GLUCOSE 121* 141*  --  129*   CALCIUM 8.0* 7.7*  --  8.1*   MG 2.30 2.30  --  2.20   PHOS  -- --  2.5  --    ANIONGAP 16 12  --  12     Hepatic:   Recent Labs     03/29/22  1135 03/30/22  0320 03/31/22  0325   * 96* 135*   ALT 78* 58* 71*   BILITOT 1.3* 1.7* 1.8*   BILIDIR  --  1.2* 1.4*   PROT 5.7* 5.2* 4.6*   LABALBU 2.5* 2.3* 1.8*   ALKPHOS 226* 223* 273*     Troponin:   Recent Labs     03/29/22  1135   TROPONINI <0.01     BNP: No results for input(s): BNP in the last 72 hours. Lipids: No results for input(s): CHOL, HDL in the last 72 hours. Invalid input(s): LDLCALCU, TRIGLYCERIDE  ABGs:  No results for input(s): PHART, TBB0DAV, PO2ART, TDM5YMM, BEART, THGBART, M8DFWYLZ, COV2ZGL in the last 72 hours. INR:   Recent Labs     03/29/22  1135   INR 1.04     Lactate: No results for input(s): LACTATE in the last 72 hours. Cultures:  -----------------------------------------------------------------  RAD:   US RENAL COMPLETE   Final Result      1. Mild to moderate left hydronephrosis, not significantly changed. FL MODIFIED BARIUM SWALLOW W VIDEO   Final Result      1. No laryngeal penetration or aspiration. US ABDOMEN LIMITED   Final Result   IMPRESSION :      Gallbladder polyps, the largest of which measures 6 x 5 mm. One year follow up ultrasound recommended. Left hydronephrosis, as noted on the prior study. Mildly heterogeneous increased hepatic echogenicity. Findings may reflect mild steatosis. US RENAL COMPLETE   Final Result   IMPRESSION :      Gallbladder polyps, the largest of which measures 6 x 5 mm. One year follow up ultrasound recommended. Left hydronephrosis, as noted on the prior study. Mildly heterogeneous increased hepatic echogenicity. Findings may reflect mild steatosis. CT ABDOMEN PELVIS WO CONTRAST Additional Contrast? None   Final Result      1. Moderate left hydronephrosis with an obstructing 3 mm calculus at the left UVJ.    2.  Trace left pleural effusion with associated mild airspace consolidation which could represent atelectasis and/or pneumonia. [facility]   Final Result      XR CHEST PORTABLE   Final Result   1. No interval changes. Assessment/Plan:     Tammy Garcia is a 66year old Male with a PmHx of prostate cancer with metastasis to the pelvis, asthma, lipoma resection, hernia repair who present to the ED with a chief complaint of hypotension, diarrhea, fatigue.      Hypovolemic hyponatremia likely 2/2 viral gastroenteritis and poor PO intake  Hypotension  Patient is acutely dehydrated and hypotensive, responding to IVF. Diarrhea and N/V over the weekend has since resolved and patient endorse poor PO intake since then. - Nephrology consulted, appreciate recs  - Blood culture x2, respiratory, urine culture to rule out sepsis  - Off IVF  - Urine studies showed low urine sodium, urine osmol 397 which is mildly low  - Dietician consulted due to poor PO intake, continued concern for dysphagia  - Follow BMP and mag  - PT/OT  - s/p bone marrow biopsy. Concern for AML, awaiting results     AQUILINO  Hx of Urinary retention  Likely 2/2 poor PO intake, N/V, and diarrhea. However he has a hx of urinary retention so it might also be due to obstruction. 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 Likely acute renal failure causing elevated BMP. - Urology consult, appreciate recs  - CT abdomen without contrast showed L renal stone 3mm, obstructing with hydronephrosis. Per urology, give pt a chance to pass it. Renal US shows unchanged hydronephrosis  - Leblanc catheter in place  - Home flomax     Suspected UTI vs. pneumonia  Urinalysis showed 2+ WBC with burining upon urination and hx of retention. Did not meets sepsis criteria. CT chest showed L pleural effusion, possible atelectasis or pneumonia. Did fail swallow eval. Afebrile, normal WBC.   - Zosyn (3/30 - )  - procal >100 -> 66  - Urine, blood, and respiratory culture pending, NGTD  - ID consulted, appreciate recs     Prostate cancer  Started radiation therapy on 3/21. S/p 1 round of hormonal therapy. Not undergoing chemotherapy or plans for surgery  - Consulted urology, appreciate recs  - Consulted oncology, Dr. Rosie Asencio, appreciate recs  - Monitor BMP  - Monitor for signs of retention     Thrombocytopenia 2/2 radiation therapy  Platelets 71D today, baseline less than a month ago >400k s/p radiation therapy. Need to rule out TTP/HUS. Concern for sepsis as well. - Hematology and oncology consulted, appreciate recs  - CBC daily  - Transfuse platelets <92, increased this morning  - Path review, smear  - Haptoglobin mildly elevated  - PT/INR normal, TTP/HUS is on the differential  - PTT and fibrinogen  - Holding anticoag     Anemia  New onset. Baseline is 13. Low iron counts and in the context of thrombocytopenia and recent radiation treatments. - Blood smear pending  - Normal MCV and ferritin but low iron counts and iron sat which could be consistent with anemia of chronic disease, however the drop is acute  - Monitor CBC    Anion gap metabolic acidosis - resolved  No clear cause with initial LA normal. Respiratory compensation.  - Denies alcohol use  - Anion gap closed to 16 on recheck  - on bicarb gtt, can likely transition to NS tomorrow or per nephro recs     Transaminitis  Recent diarrhea and N/V with elevated levels could warrant a hepatitis work-up.   - Abdominal US showed mild steatosis and GB polyps  - Trend liver panels: stable     Chronic back pain  - Fentanyl patch      Code Status: Full code  FEN: Dysphagia diet  PPX:SCD  DISPO: Dawn Khalil MD, PGY-1  04/01/22  9:23 AM    This patient has been staffed and discussed with Susanna Rawls MD.

## 2022-04-01 NOTE — PROGRESS NOTES
[facility]   Consult Note  Jada Garcia    :      MRN:  [id number]    Reason for Consult: Anemia, thrombocytopenia    Subjetive: No acute events overnight. Patient seen and examined at been side. Patient reports some abdominal bloating and gas. Patient denies shortness of breath, chest pain, chills, nausea, vomiting He denies urinary frequency, dysuria. Patient is hemodynamically stable and afebrile He remains on SCDs  Platelet count 45, patient received 2 units of platelets in the last 2 days, 3 total.   Bone marrow biopsy performed yesterday  Waiting for pathology results    HPI: Jada Garcia is a 66 y.o.  male patient, who was seen at the request of Dr. Alexei Fowler MD.     Patient has a PMH as below notable for prostate cancer with pelvic metastases, asthma, urinary retention, inguinal hernia repair who presented with hypotension diarrhea and fatigue. Patient had the first session of radiation therapy on 3/21 and received 1 dose of hormonal therapy. Patient is not on chemo and has no plans for surgery. Patient endorsed some burning and discomfort with urination. Patient reports a week ago he was more stronger oregano he substantially more weak. Patient was found hypotensive in his last appointment which improved after 1 L bolus of fluids. Patient reported that since last weekend he has been having nonbloody diarrhea, intermittent cramping abdominal pain, generalized weakness, and nausea vomiting which resolved on .   After this episodes family and patient report increased weakness and decreased p.o. intake due to general malaise and nausea and vomiting.     The patient denies abdominal or flank pain, anorexia, nausea or vomiting, dysphagia, change in bowel habits or black or bloody stools or weight loss.     Patient denies any exertional chest pain, dyspnea, palpitations, syncope, orthopnea, edema or paroxysmal nocturnal dyspnea.     The patient denies frequency or hematuria.     He denies constitutional symptoms of fatigue, weakness, weight loss or gain, fevers, night sweats.         On admission patient was hypotensive and afebrile. Sodium 121, creatinine 4.9, anion gap 21, BNP elevated 7944, , platelets 11. No acute changes were found on a chest x-ray. Hematology was consulted for further work-up and management of anemia thrombocytopenia       HISTORY OF PRESENT ILLNESS:    Past Medical History:     has a past medical history of Asthma, Environmental allergies, Inguinal hernia, and Prostate cancer ([facility] Past Surgical History:    Past Surgical History:   Procedure Laterality Date    INGUINAL HERNIA REPAIR Bilateral 6/20/2019    LAPAROSCOPIC BILATERAL INGUINAL HERNIA REPAIR WITH MESH performed by Mac Santo MD at [facility]      back         Current Medications:     polyethylene glycol  17 g Oral Daily    senna  1 tablet Oral Nightly    piperacillin-tazobactam  3,375 mg IntraVENous Q12H    tamsulosin  0.4 mg Oral Daily    sodium chloride flush  5-40 mL IntraVENous 2 times per day    lidocaine  1 patch TransDERmal Daily     Allergies: Allergies   Allergen Reactions    Other      dust,,animal dander,pollen      Social History:    reports that he has never smoked. He has never used smokeless tobacco. He reports current alcohol use. He reports that he does not use drugs. Family History:     family history is not on file. ROS  Review of Systems   Constitutional: Positive for activity change, appetite change and fatigue. Negative for chills, diaphoresis, fever and unexpected weight change. HENT: Negative for trouble swallowing. Eyes: Negative for redness and visual disturbance. Respiratory: Negative for cough, choking, chest tightness and shortness of breath. Cardiovascular: Negative for chest pain and palpitations. Gastrointestinal: Positive for abdominal distention, diarrhea, nausea and vomiting. Negative for abdominal pain and constipation. Endocrine: Negative for polydipsia and polyphagia. Genitourinary: Positive for difficulty urinating. Negative for dysuria, flank pain and hematuria. Musculoskeletal: Negative for arthralgias, myalgias and neck stiffness. Skin: Negative for rash and wound. Allergic/Immunologic: Negative for environmental allergies and food allergies. Neurological: Positive for weakness and light-headedness. Negative for dizziness, seizures, syncope, facial asymmetry, numbness and headaches.      ROS: A 10 point review of systems was conducted, significant findings as noted in HPI. Physical Exam:   BP (!) 124/57   Pulse 81   Temp 98 °F (36.7 °C) (Oral)   Resp 20   Ht 5' 6\" (1.676 m)   Wt 159 lb 2.8 oz (72.2 kg)   SpO2 95%   BMI 25.69 kg/m²      Physical Exam  Constitutional:       Appearance: Normal appearance. He is ill-appearing. HENT:      Head: Normocephalic. Mouth/Throat:      Mouth: Mucous membranes are moist.   Eyes:      Extraocular Movements: Extraocular movements intact. Conjunctiva/sclera: Conjunctivae normal.      Pupils: Pupils are equal, round, and reactive to light. Cardiovascular:      Rate and Rhythm: Normal rate and regular rhythm. Pulses: Normal pulses. Heart sounds: Normal heart sounds. Pulmonary:      Effort: Pulmonary effort is normal.      Breath sounds: Normal breath sounds. Abdominal:      General: Abdomen is flat. Bowel sounds are normal. There is distension. Palpations: Abdomen is soft. Musculoskeletal:         General: Normal range of motion. Cervical back: Normal range of motion. Skin:     General: Skin is dry. Neurological:      General: No focal deficit present. Mental Status: He is alert and oriented to person, place, and time. Mental status is at baseline. Motor: Weakness (Weakness most prominent in the b/l LE 3/5. Full strength in b/l UE. Difficulty leaning forward.  ) present.      DATA:  CBC:   Recent Labs     03/31/22  0325 03/31/22  2159 04/01/22  0411   WBC 7.0 7.6 9.2   HGB 8.4* 8.6* 8.6*   HCT 25.1* 25.1* 25.0*   MCV 85.3 86.2 85.0   PLT 10* 25* 45*     BMP:   Recent Labs     03/30/22  0320 03/31/22  0325 03/31/22  1010 04/01/22  0411   * 128*  --  132*   K 3.3* 3.3*  --  3.5   CL 92* 91*  --  93*   CO2 19* 25  --  27   PHOS  --   --  2.5  --    * 119*  --  100*   CREATININE 3.7* 2.7*  --  2.1*     LIVER PROFILE:   Recent Labs     03/30/22 0320 03/31/22 0325   AST 96* 135*   ALT 58* 71*   BILIDIR 1.2* 1.4*   BILITOT 1.7* 1.8*   ALKPHOS 223* 273*     PT/INR:    Lab Results   Component Value Date    PROTIME 11.8 03/29/2022    PROTIME 11.0 06/20/2019    INR 1.04 03/29/2022    INR 0.96 06/20/2019     PTT:    Lab Results   Component Value Date    APTT 30.7 03/29/2022    APTT 32.2 06/20/2019     UA:  Recent Labs     03/29/22  1453   COLORU Yellow   PHUR 6.0   WBCUA 3-5   RBCUA 0-2   BACTERIA 2+*   CLARITYU Clear   SPECGRAV 1.020   LEUKOCYTESUR SMALL*   UROBILINOGEN 0.2   BILIRUBINUR SMALL*   BLOODU MODERATE*   GLUCOSEU Negative     Magnesium:    Lab Results   Component Value Date    MG 2.20 04/01/2022    MG 2.30 03/31/2022    MG 2.30 03/30/2022     PEDRO LUIS:  No results found for: ANATITER, PEDRO LUIS  Uric Acid:  No components found for: URIC  Reticulocyte Count:    Lab Results   Component Value Date    IRF 0.22 03/29/2022     CRP:  No components found for: CPR  Rosario/Direct Antibody Test:  No components found for: [address], [place], [place]    IMPRESSION/RECOMMENDATIONS:    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 Today 39.  Patient had received 3 units of platelets since admission   -Continue supportive care  -Monitor CBC, transfuse if platelets <04  -Follow up biopsy results  -Continue holding anticoagulation     Normocytic anemia with concern of AML  Hemoglobin on February 13, today 8.6 stable RDW 16.  Reticulocyte count with adequate bone marrow response. Uric acid 9.9  -Continue to monitor CBC  -Follow up biopsy results    Thank you for the opportunity to participate in [facility]. Please do not hesitate to contact me for questions or concerns regarding this patient's evaluation.       W/D/W/A Dr. Brown Or, PGY1  4/1/2022  1:13 PM

## 2022-04-01 NOTE — PROGRESS NOTES
Nephrology  Note                                                                                                                                                                                                                                                                                                                                                               Office : [phone]     Fax :[phone]              Patient's Name: Ivonne Apple  11:19 AM  4/1/2022    Reason for Consult: AQUILINO  Requesting Physician:  Mariela Chatterjee MD      Chief Complaint:  Emry Abts        04/01/22     Pt has good UO   Cr better   No diarrhea       Past Medical History:   Diagnosis Date    Asthma     Environmental allergies     Inguinal hernia     Prostate cancer [facility])        Past Surgical History:   Procedure Laterality Date    INGUINAL HERNIA REPAIR Bilateral 6/20/2019    LAPAROSCOPIC BILATERAL INGUINAL HERNIA REPAIR WITH MESH performed by Renate Michael MD at [address] [place] [address]      back        History reviewed. No pertinent family history. reports that he has never smoked. He has never used smokeless tobacco. He reports current alcohol use. He reports that he does not use drugs. Allergies:   Other    Current Medications:    aluminum & magnesium hydroxide-simethicone (MAALOX) 200-200-20 MG/5ML suspension 30 mL, Q6H PRN  0.9 % sodium chloride infusion, Continuous  0.9 % sodium chloride infusion, PRN  polyethylene glycol (GLYCOLAX) packet 17 g, Daily  senna (SENOKOT) tablet 8.6 mg, Nightly  piperacillin-tazobactam (ZOSYN) 3,375 mg in dextrose 5 % 50 mL IVPB (mini-bag), Q12H  tamsulosin (FLOMAX) capsule 0.4 mg, Daily  sodium chloride flush 0.9 % injection 5-40 mL, 2 times per day  sodium chloride flush 0.9 % injection 5-40 mL, PRN  0.9 % sodium chloride infusion, PRN  ondansetron (ZOFRAN-ODT) disintegrating tablet 4 mg, Q8H PRN   Or  ondansetron (ZOFRAN) injection 4 mg, Q6H PRN  acetaminophen (TYLENOL) tablet 650 mg, Q6H PRN   Or  acetaminophen (TYLENOL) suppository 650 mg, Q6H PRN  lidocaine 4 % external patch 1 patch, Daily  0.9 % sodium chloride infusion, PRN        Review of Systems:   14 point ROS obtained but were negative except mentioned in HPI      Physical exam:     Vitals:  BP (!) 124/57   Pulse 81   Temp 98 °F (36.7 °C) (Oral)   Resp 20   Ht 5' 6\" (1.676 m)   Wt 159 lb 2.8 oz (72.2 kg)   SpO2 95%   BMI 25.69 kg/m²   Constitutional:  OAA X3 NAD  Skin: no rash, turgor wnl  Heent:  eomi, mmm  Neck: no bruits or jvd noted  Cardiovascular:  S1, S2 without m/r/g  Respiratory: CTA B without w/r/r  Abdomen:  +bs, soft, nt, nd  Ext: No lower extremity edema  Psychiatric: mood and affect appropriate  Musculoskeletal:  Rom, muscular strength intact    Data:   Labs:  CBC:   Recent Labs     03/31/22 0325 03/31/22 2159 04/01/22 0411   WBC 7.0 7.6 9.2   HGB 8.4* 8.6* 8.6*   PLT 10* 25* 45*     BMP:    Recent Labs     03/30/22 0320 03/31/22 0325 04/01/22 0411   * 128* 132*   K 3.3* 3.3* 3.5   CL 92* 91* 93*   CO2 19* 25 27   * 119* 100*   CREATININE 3.7* 2.7* 2.1*   GLUCOSE 121* 141* 129*     Ca/Mg/Phos:   Recent Labs     03/30/22  0320 03/31/22 0325 03/31/22  1010 04/01/22  0411   CALCIUM 8.0* 7.7*  --  8.1*   MG 2.30 2.30  --  2.20   PHOS  --   --  2.5  --      Hepatic:   Recent Labs     03/29/22  1135 03/30/22 0320 03/31/22 0325   * 96* 135*   ALT 78* 58* 71*   BILITOT 1.3* 1.7* 1.8*   ALKPHOS 226* 223* 273*     Troponin:   Recent Labs     03/29/22  1135   TROPONINI <0.01     BNP: No results for input(s): BNP in the last 72 hours. Lipids: No results for input(s): CHOL, TRIG, HDL, LDLCALC, LABVLDL in the last 72 hours. ABGs: No results for input(s): PHART, PO2ART, [id number] in the last 72 hours.   INR:   Recent Labs     03/29/22  1135   INR 1.04     UA:  Recent Labs     03/29/22  1453   COLORU Yellow   CLARITYU Clear   GLUCOSEU Negative   BILIRUBINUR SMALL*   KETUA TRACE* SPECGRAV 1.020   BLOODU MODERATE*   PHUR 6.0   PROTEINU TRACE*   UROBILINOGEN 0.2   NITRU Negative   LEUKOCYTESUR SMALL*   LABMICR YES   Debra Mano NotGiven      Urine Microscopic:   Recent Labs     03/29/22  1453   BACTERIA 2+*   WBCUA 3-5   RBCUA 0-2   EPIU 0-1     Urine Culture: No results for input(s): LABURIN in the last 72 hours. Urine Chemistry:   Recent Labs     03/29/22  1453   LABCREA 80.1   NAUR <20             IMAGING:  US RENAL COMPLETE   Final Result      1. Mild to moderate left hydronephrosis, not significantly changed. FL MODIFIED BARIUM SWALLOW W VIDEO   Final Result      1. No laryngeal penetration or aspiration. US ABDOMEN LIMITED   Final Result   IMPRESSION :      Gallbladder polyps, the largest of which measures 6 x 5 mm. One year follow up ultrasound recommended. Left hydronephrosis, as noted on the prior study. Mildly heterogeneous increased hepatic echogenicity. Findings may reflect mild steatosis. US RENAL COMPLETE   Final Result   IMPRESSION :      Gallbladder polyps, the largest of which measures 6 x 5 mm. One year follow up ultrasound recommended. Left hydronephrosis, as noted on the prior study. Mildly heterogeneous increased hepatic echogenicity. Findings may reflect mild steatosis. CT ABDOMEN PELVIS WO CONTRAST Additional Contrast? None   Final Result      1. Moderate left hydronephrosis with an obstructing 3 mm calculus at the left UVJ. 2.  Trace left pleural effusion with associated mild airspace consolidation which could represent atelectasis and/or pneumonia. POC US [facility]   Final Result      XR CHEST PORTABLE   Final Result   1. No interval changes. XR CHEST PORTABLE    (Results Pending)       Assessment/Plan   AQUILINO  UTI  Cr on admission was 4.9. Baseline was 0.9. Patient endorsing left flank pain. Recently having poor PO intake.  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     -Cr  - improving   -Urology consulted- Leblanc placed   - SANTI - obstruction   -Serum osmolality 315 / Urine osmol 397 / Urine Na <20   -Continue Ceftriaxone  3/29       Anemia  Hx of prostate cancer with mets. Started radiation therapy on 3/21.  S/p 1 round of hormonal therapy     Acid- base/ Electrolyte imbalance   Hyponatremia   Hypokalemia   - monitor           Recommend to dose adjust all medications  based on renal functions  Maintain SBP> 90 mmHg   Daily weights   AVOID NSAIDs  Avoid Nephrotoxins  Monitor Intake/Output  Call if significant decrease in urine output     Dewight Severin, MD

## 2022-04-02 LAB
ALBUMIN SERPL-MCNC: 2.1 G/DL (ref 3.4–5)
ALP BLD-CCNC: 388 U/L (ref 40–129)
ALT SERPL-CCNC: 69 U/L (ref 10–40)
ANION GAP SERPL CALCULATED.3IONS-SCNC: 10 MMOL/L (ref 3–16)
ANISOCYTOSIS: ABNORMAL
AST SERPL-CCNC: 76 U/L (ref 15–37)
ATYPICAL LYMPHOCYTE RELATIVE PERCENT: 2 % (ref 0–6)
BASOPHILS ABSOLUTE: 0 K/UL (ref 0–0.2)
BASOPHILS RELATIVE PERCENT: 0 %
BILIRUB SERPL-MCNC: 1.7 MG/DL (ref 0–1)
BILIRUBIN DIRECT: 1.1 MG/DL (ref 0–0.3)
BILIRUBIN, INDIRECT: 0.6 MG/DL (ref 0–1)
BLOOD CULTURE, ROUTINE: NORMAL
BUN BLDV-MCNC: 86 MG/DL (ref 7–20)
CALCIUM SERPL-MCNC: 8 MG/DL (ref 8.3–10.6)
CHLORIDE BLD-SCNC: 96 MMOL/L (ref 99–110)
CO2: 26 MMOL/L (ref 21–32)
CREAT SERPL-MCNC: 2 MG/DL (ref 0.8–1.3)
CULTURE, BLOOD 2: NORMAL
EOSINOPHILS ABSOLUTE: 0.1 K/UL (ref 0–0.6)
EOSINOPHILS RELATIVE PERCENT: 1 %
GFR AFRICAN AMERICAN: 40
GFR NON-AFRICAN AMERICAN: 33
GLUCOSE BLD-MCNC: 121 MG/DL (ref 70–99)
HCT VFR BLD CALC: 24.1 % (ref 40.5–52.5)
HEMOGLOBIN: 8.2 G/DL (ref 13.5–17.5)
LYMPHOCYTES ABSOLUTE: 1.4 K/UL (ref 1–5.1)
LYMPHOCYTES RELATIVE PERCENT: 16 %
MAGNESIUM: 2.3 MG/DL (ref 1.8–2.4)
MCH RBC QN AUTO: 28.8 PG (ref 26–34)
MCHC RBC AUTO-ENTMCNC: 34 G/DL (ref 31–36)
MCV RBC AUTO: 84.7 FL (ref 80–100)
MICROCYTES: ABNORMAL
MONOCYTES ABSOLUTE: 0.3 K/UL (ref 0–1.3)
MONOCYTES RELATIVE PERCENT: 4 %
NEUTROPHILS ABSOLUTE: 6.2 K/UL (ref 1.7–7.7)
NEUTROPHILS RELATIVE PERCENT: 77 %
OVALOCYTES: ABNORMAL
PDW BLD-RTO: 16.3 % (ref 12.4–15.4)
PLATELET # BLD: 75 K/UL (ref 135–450)
PLATELET SLIDE REVIEW: ABNORMAL
PMV BLD AUTO: 9.8 FL (ref 5–10.5)
POTASSIUM SERPL-SCNC: 3.7 MMOL/L (ref 3.5–5.1)
RBC # BLD: 2.84 M/UL (ref 4.2–5.9)
SODIUM BLD-SCNC: 132 MMOL/L (ref 136–145)
STOMATOCYTES: ABNORMAL
TEAR DROP CELLS: ABNORMAL
TOTAL PROTEIN: 5.1 G/DL (ref 6.4–8.2)
WBC # BLD: 8 K/UL (ref 4–11)

## 2022-04-02 PROCEDURE — 99233 SBSQ HOSP IP/OBS HIGH 50: CPT | Performed by: INTERNAL MEDICINE

## 2022-04-02 PROCEDURE — 6370000000 HC RX 637 (ALT 250 FOR IP): Performed by: STUDENT IN AN ORGANIZED HEALTH CARE EDUCATION/TRAINING PROGRAM

## 2022-04-02 PROCEDURE — 83735 ASSAY OF MAGNESIUM: CPT

## 2022-04-02 PROCEDURE — 2580000003 HC RX 258: Performed by: STUDENT IN AN ORGANIZED HEALTH CARE EDUCATION/TRAINING PROGRAM

## 2022-04-02 PROCEDURE — 85025 COMPLETE CBC W/AUTO DIFF WBC: CPT

## 2022-04-02 PROCEDURE — 80076 HEPATIC FUNCTION PANEL: CPT

## 2022-04-02 PROCEDURE — 80048 BASIC METABOLIC PNL TOTAL CA: CPT

## 2022-04-02 PROCEDURE — 2060000000 HC ICU INTERMEDIATE R&B

## 2022-04-02 PROCEDURE — 6360000002 HC RX W HCPCS: Performed by: STUDENT IN AN ORGANIZED HEALTH CARE EDUCATION/TRAINING PROGRAM

## 2022-04-02 RX ADMIN — SODIUM CHLORIDE, PRESERVATIVE FREE 10 ML: 5 INJECTION INTRAVENOUS at 20:32

## 2022-04-02 RX ADMIN — ACETAMINOPHEN 325MG 650 MG: 325 TABLET ORAL at 23:15

## 2022-04-02 RX ADMIN — PIPERACILLIN AND TAZOBACTAM 3375 MG: 3; .375 INJECTION, POWDER, LYOPHILIZED, FOR SOLUTION INTRAVENOUS at 06:34

## 2022-04-02 RX ADMIN — TAMSULOSIN HYDROCHLORIDE 0.4 MG: 0.4 CAPSULE ORAL at 08:49

## 2022-04-02 RX ADMIN — PIPERACILLIN AND TAZOBACTAM 3375 MG: 3; .375 INJECTION, POWDER, LYOPHILIZED, FOR SOLUTION INTRAVENOUS at 15:49

## 2022-04-02 RX ADMIN — PIPERACILLIN AND TAZOBACTAM 3375 MG: 3; .375 INJECTION, POWDER, LYOPHILIZED, FOR SOLUTION INTRAVENOUS at 23:22

## 2022-04-02 RX ADMIN — SODIUM CHLORIDE, PRESERVATIVE FREE 10 ML: 5 INJECTION INTRAVENOUS at 09:00

## 2022-04-02 RX ADMIN — POLYETHYLENE GLYCOL 3350 17 G: 17 POWDER, FOR SOLUTION ORAL at 08:49

## 2022-04-02 ASSESSMENT — PAIN DESCRIPTION - FREQUENCY: FREQUENCY: INTERMITTENT

## 2022-04-02 ASSESSMENT — PAIN SCALES - GENERAL
PAINLEVEL_OUTOF10: 0
PAINLEVEL_OUTOF10: 3
PAINLEVEL_OUTOF10: 0

## 2022-04-02 ASSESSMENT — PAIN DESCRIPTION - ONSET: ONSET: ON-GOING

## 2022-04-02 ASSESSMENT — PAIN DESCRIPTION - PAIN TYPE: TYPE: ACUTE PAIN;CHRONIC PAIN

## 2022-04-02 ASSESSMENT — PAIN DESCRIPTION - DESCRIPTORS: DESCRIPTORS: ACHING

## 2022-04-02 ASSESSMENT — PAIN DESCRIPTION - PROGRESSION: CLINICAL_PROGRESSION: NOT CHANGED

## 2022-04-02 ASSESSMENT — PAIN DESCRIPTION - LOCATION: LOCATION: BACK

## 2022-04-02 ASSESSMENT — PAIN DESCRIPTION - ORIENTATION: ORIENTATION: MID

## 2022-04-02 ASSESSMENT — PAIN - FUNCTIONAL ASSESSMENT: PAIN_FUNCTIONAL_ASSESSMENT: PREVENTS OR INTERFERES SOME ACTIVE ACTIVITIES AND ADLS

## 2022-04-02 NOTE — PROGRESS NOTES
4 Eyes Admission Assessment     I agree as the admission nurse that 2 RN's have performed a thorough Head to Toe Skin Assessment on the patient. ALL assessment sites listed below have been assessed on admission. Areas assessed by both nurses: Karely Huddle  [x]   Head, Face, and Ears   [x]   Shoulders, Back, and Chest  [x]   Arms, Elbows, and Hands   [x]   Coccyx, Sacrum, and Ischium  [x]   Legs, Feet, and Heels        Does the Patient have Skin Breakdown?   No         Brian Prevention initiated:  Yes   Wound Care Orders initiated:  Yes      [id number]  nurse consulted for Pressure Injury (Stage 3,4, Unstageable, DTI, NWPT, and Complex wounds) or Brian score 18 or lower:  Yes      Nurse 1 eSignature: Electronically signed by Randy Alaniz RN on 4/2/22 at 7:41 AM EDT    **SHARE this note so that the co-signing nurse is able to place an eSignature**    Nurse 2 eSignature: Electronically signed by Oscar Cazares RN on 4/2/22 at 7:50 AM EDT

## 2022-04-02 NOTE — PLAN OF CARE
Problem: Falls - Risk of:  Goal: Will remain free from falls  Description: Will remain free from falls  Outcome: Ongoing  Note:   Pt is a High fall risk. See Erin Santanaper Fall Score and ABCDS Injury Risk assessments.   + Screening for Orthostasis and/or + High Fall Risk per ZHANG/ABCDS: Explained fall risk precautions to pt and family and rationale behind their use to keep the patient safe. Pt bed is in low position, side rails up, call light and belongings are in reach. Fall wristband applied and present on pts wrist.  Bed alarm on. Pt encouraged to call for assistance. Will continue with hourly rounds for PO intake, pain needs, toileting and repositioning as needed. Problem: Safety:  Goal: Ability to chew and swallow food without choking will improve  Description: Ability to chew and swallow food without choking will improve  Outcome: Ongoing  Note: Pt able to swallow water without choking so far this shift. Problem: Pain:  Goal: Pain level will decrease  Description: Pain level will decrease  Outcome: Ongoing  Note: Pt complained of abdominal pain x1. Pt received prn tylenol for pain x1 so far this shift.

## 2022-04-02 NOTE — PROGRESS NOTES
GI Progress Note      Moody Low is a 66 y.o. male patient. 1. Hypovolemia    2. AQUILINO (acute kidney injury) ([facility] [address] [address]    3. Thrombocytopenia ([facility] [address]    4. Increased anion gap metabolic acidosis        Admit Date: 3/29/2022    Subjective:       No specific complaints. States that he ate some breakfast this morning. Appetite \"not great. \" Had BM last night after prune juice.        ROS:  As per above    Scheduled Meds:   piperacillin-tazobactam  3,375 mg IntraVENous Q8H    polyethylene glycol  17 g Oral Daily    senna  1 tablet Oral Nightly    tamsulosin  0.4 mg Oral Daily    sodium chloride flush  5-40 mL IntraVENous 2 times per day    lidocaine  1 patch TransDERmal Daily       Continuous Infusions:   sodium chloride      sodium chloride      sodium chloride         PRN Meds:  aluminum & magnesium hydroxide-simethicone, sodium chloride, sodium chloride flush, sodium chloride, ondansetron **OR** ondansetron, acetaminophen **OR** acetaminophen, sodium chloride      Objective:       Patient Vitals for the past 24 hrs:   BP Temp Temp src Pulse Resp SpO2 Weight   04/02/22 0853 (!) 113/52 98 °F (36.7 °C) Oral 86 16 95 % --   04/02/22 0802 -- -- -- -- -- -- 158 lb 8.2 oz (71.9 kg)   04/02/22 0637 (!) 102/52 -- -- -- -- -- --   04/02/22 0610 (!) 110/52 -- -- 75 16 -- --   04/02/22 0540 -- -- -- 78 17 -- --   04/02/22 0510 -- -- -- 74 19 -- --   04/02/22 0440 (!) 100/47 -- -- 75 15 96 % --   04/02/22 0438 (!) 95/48 98 °F (36.7 °C) Oral 75 28 96 % --   04/01/22 2301 102/60 98.8 °F (37.1 °C) Oral 84 20 94 % --   04/01/22 2212 -- -- -- 76 15 -- --   04/01/22 2142 -- -- -- 75 17 -- --   04/01/22 2112 -- -- -- 79 16 -- --   04/01/22 2042 (!) 104/59 98.9 °F (37.2 °C) Oral 77 16 99 % --   04/01/22 1639 -- 98.9 °F (37.2 °C) Oral 86 21 -- --   04/01/22 1300 (!) 100/58 98.5 °F (36.9 °C) Oral 85 17 100 % --       Exam:  VITALS:  BP (!) 113/52   Pulse 86   Temp 98 °F (36.7 °C) (Oral)   Resp 16   Ht 5' 6\" (1.676 m)   Wt 158 lb over the weekend.  Will check back Monday to follow-up and see if any indication for EGD      Jamia Hernandez MD  4/2/2022  11:03 AM

## 2022-04-02 NOTE — PROGRESS NOTES
Progress Note    Admit Date: 3/29/2022  Day: 5  Diet: ADULT DIET; Dysphagia - Minced and Moist    CC: Weakness, Hypotension    Interval history:   MAP overnight 62, improved from previous night. No new IVF were started. Seen and assessed at bedside  Patient is alert this morning and has orientation x3  Strength and BP are improved, creatinine and platelets are improving  Procal 66 down from >100  Patient denies fevers, chills, SOB, chest or abdominal pain, N/V, diarrhea. One BM yesterday  He reports no difficulties swallowing but says he still has reduced appetite    HPI:   Michael Spears is a 66year old Male with a PmHx of prostate cancer with metastasis to the pelvis, asthma, lipoma resection, urinary retention, hernia repair who present to the ED with a chief complaint of hypotension, diarrhea, fatigue. Currently undergoing radiation therapy, but no chemotherapy for prostate cancer and at his last appointment a few days ago he was hypotensive to 70's/40's which improved after a 1L bolus. Last weekend he endorsed non-bloody diarrhea, intermittent cramping abdominal pain, generalized weakness, and N/V which resolved by Sunday. Family endorses increased weakness and decreased PO intake due to general malaise and N/V. He has increasing weakness for the last few weeks but was still able to walk and drive before the weekend, but has worsened since. He just started radiation therapy on 3/21 and has received one dose of hormonal therapy. Not on chemo and no plans for surgery. Does endorse some burning and discomfort upon urination.  He was able a weak ago to drive himself home and now he is substantially more weak.      He denies fever, chills, chest pain, shortness of breath, dysuria, hematuria, melena, hematochezia, numbness, unilateral weakness, lower extremity weakness, bowel or bladder incontinence.     In the ED, he is hyponatremic to 121, creatinine 4.9, anion gap of 21, LA 1.2, BNP elevated to 7944 (last echo 11/2021 w/ EF 50% and indeterminate diastolic), , platelets 11, VBG 6.77/45.2/42. No acute changes to chest XR. Medications:     Scheduled Meds:   piperacillin-tazobactam  3,375 mg IntraVENous Q8H    polyethylene glycol  17 g Oral Daily    senna  1 tablet Oral Nightly    tamsulosin  0.4 mg Oral Daily    sodium chloride flush  5-40 mL IntraVENous 2 times per day    lidocaine  1 patch TransDERmal Daily     Continuous Infusions:   sodium chloride      sodium chloride      sodium chloride       PRN Meds:aluminum & magnesium hydroxide-simethicone, sodium chloride, sodium chloride flush, sodium chloride, ondansetron **OR** ondansetron, acetaminophen **OR** acetaminophen, sodium chloride    Objective:   Vitals:   T-max:  Patient Vitals for the past 8 hrs:   BP Temp Temp src Pulse Resp SpO2   04/02/22 0440 (!) 100/47 -- -- -- -- --   04/02/22 0438 (!) 95/48 98 °F (36.7 °C) Oral 75 28 96 %   04/01/22 2301 102/60 98.8 °F (37.1 °C) Oral 84 20 94 %       Intake/Output Summary (Last 24 hours) at 4/2/2022 0618  Last data filed at 4/2/2022 0440  Gross per 24 hour   Intake 1740 ml   Output 2400 ml   Net -660 ml       Review of Systems  A full 10 point ROS was performed with pertinent + and - listed above    Physical Exam  Constitutional:       General: He is not in acute distress. Appearance: He is ill-appearing. HENT:      Head: Normocephalic. Right Ear: External ear normal.      Left Ear: External ear normal.      Nose: Nose normal.     Pharynx: Oropharynx is clear. No oropharyngeal exudate. Eyes:      Extraocular Movements: Extraocular movements intact. Conjunctiva/sclera: Conjunctivae normal.      Pupils: Pupils are equal, round, and reactive to light. Cardiovascular:      Rate and Rhythm: Normal rate and regular rhythm. Pulses: Normal pulses. Heart sounds: No friction rub.  No gallop. + murmur heard best at the SANTI border  Pulmonary:      Effort: Pulmonary effort is normal. No respiratory distress. Breath sounds: No stridor. No wheezing, rhonchi. Slight crackles at lung bases with decreased breath sounds. Abdominal:      General: There is no distension     Palpations: Abdomen is soft. Tenderness: There is no abdominal tenderness. There is no right CVA tenderness, left CVA tenderness, guarding or rebound. Musculoskeletal:         General: No deformity. Now has bilateral LE swelling, 2+ pitting edema  Skin:     General: Skin is warm. Coloration: Skin is not jaundiced. Findings: No bruising, erythema or lesion. Neurological:      Mental Status: Alert and oriented x3     Cranial Nerves: No cranial nerve deficit. Motor: Weakness present. Comments: Weakness most prominent in the b/l LE 4/5. Full strength in b/l UE. Difficulty leaning forward. Strength has improved    LABS:    CBC:   Recent Labs     04/01/22 0411 04/01/22 1819 04/02/22 0429   WBC 9.2 9.3 8.0   HGB 8.6* 8.7* 8.2*   HCT 25.0* 25.7* 24.1*   PLT 45* 61* 75*   MCV 85.0 85.2 84.7     Renal:    Recent Labs     03/31/22  0325 03/31/22  1010 04/01/22 0411 04/02/22  0429   *  --  132* 132*   K 3.3*  --  3.5 3.7   CL 91*  --  93* 96*   CO2 25  --  27 26   *  --  100* 86*   CREATININE 2.7*  --  2.1* 2.0*   GLUCOSE 141*  --  129* 121*   CALCIUM 7.7*  --  8.1* 8.0*   MG 2.30  --  2.20 2.30   PHOS  --  2.5  --   --    ANIONGAP 12  --  12 10     Hepatic:   Recent Labs     03/31/22 0325 04/02/22  0429   * 76*   ALT 71* 69*   BILITOT 1.8* 1.7*   BILIDIR 1.4* 1.1*   PROT 4.6* 5.1*   LABALBU 1.8* 2.1*   ALKPHOS 273* 388*     Troponin:   No results for input(s): TROPONINI in the last 72 hours. BNP: No results for input(s): BNP in the last 72 hours. Lipids: No results for input(s): CHOL, HDL in the last 72 hours. Invalid input(s): LDLCALCU, TRIGLYCERIDE  ABGs:  No results for input(s): PHART, SKZ3UOB, PO2ART, CYQ9AGU, BEART, THGBART, H6QKUPOV, QTN3TMY in the last 72 hours.     INR: to the ED with a chief complaint of hypotension, diarrhea, fatigue.      Hypovolemic hyponatremia likely 2/2 viral gastroenteritis and poor PO intake  Hypotension  Patient is acutely dehydrated and hypotensive, responding to IVF. Diarrhea and N/V over the weekend has since resolved and patient endorse poor PO intake since then. - Nephrology consulted, appreciate recs  - Blood culture x2, respiratory, urine culture to rule out sepsis  - Restart gentle fluid given reduced PO intake and still recovering from AQUILINO  - Urine studies showed low urine sodium, urine osmol 397 which is mildly low  - Dietician consulted due to poor PO intake, continued concern for dysphagia  - Follow BMP and mag  - PT/OT  - s/p bone marrow biopsy. Concern for AML vs. MDS, awaiting results     AQUILINO  Hx of Urinary retention  Likely 2/2 poor PO intake, N/V, and diarrhea. However he has a hx of urinary retention so it might also be due to obstruction. 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 Likely acute renal failure causing elevated BMP. - Urology consult, appreciate recs  - CT abdomen without contrast showed L renal stone 3mm, obstructing with hydronephrosis. Per urology, give pt a chance to pass it. Renal US shows unchanged hydronephrosis  - Leblanc catheter in place  - Home flomax     Suspected UTI vs. pneumonia  Urinalysis showed 2+ WBC with burining upon urination and hx of retention. Did not meets sepsis criteria. CT chest showed L pleural effusion, possible atelectasis or pneumonia. Did fail swallow eval. Afebrile, normal WBC. - Zosyn (3/30 - ), can discharge on levofloxacin 500 mg x 5 days  - procal >100 -> 66  - Urine, blood, and respiratory culture pending, NGTD  - ID consulted, appreciate recs     Prostate cancer  Started radiation therapy on 3/21. S/p 1 round of hormonal therapy.  Not undergoing chemotherapy or plans for surgery  - Consulted urology, appreciate recs  - Consulted oncology, Dr. Zac Swanson, appreciate recs  - Monitor BMP  - Monitor for signs of retention     Thrombocytopenia 2/2 radiation therapy  Platelets 66F today, baseline less than a month ago >400k s/p radiation therapy. Need to rule out TTP/HUS. Concern for sepsis as well. - Hematology and oncology consulted, appreciate recs  - CBC daily  - Transfuse platelets <44, increased this morning  - Path review, smear  - Haptoglobin mildly elevated  - PT/INR normal, TTP/HUS is on the differential  - PTT and fibrinogen  - Holding anticoag     Anemia  New onset. Baseline is 13. Low iron counts and in the context of thrombocytopenia and recent radiation treatments.    - Blood smear showed dysplasia  - Normal MCV and ferritin but low iron counts and iron sat which could be consistent with anemia of chronic disease, however the drop is acute  - Monitor CBC    Anion gap metabolic acidosis - resolved  No clear cause with initial LA normal. Respiratory compensation.  - Denies alcohol use  - Anion gap closed to 16 on recheck     Transaminitis  Recent diarrhea and N/V with elevated levels  - Abdominal US showed mild steatosis and GB polyps  - liver panels: stable,d/c trend     Chronic back pain  - Fentanyl patch      Code Status: Full code  FEN: Dysphagia diet  PPX: SCD  DISPO: Mehrdad Rushing MD, PGY-1  04/02/22  6:18 AM    This patient has been staffed and discussed with Brody Razo MD.

## 2022-04-02 NOTE — PROGRESS NOTES
4 Eyes Admission Assessment     I agree as the admission nurse that 2 RN's have performed a thorough Head to Toe Skin Assessment on the patient. ALL assessment sites listed below have been assessed on admission. Areas assessed by both nurses: Gatha Backer  [x]   Head, Face, and Ears   [x]   Shoulders, Back, and Chest  [x]   Arms, Elbows, and Hands   [x]   Coccyx, Sacrum, and Ischium  [x]   Legs, Feet, and Heels        Does the Patient have Skin Breakdown?   No         Brian Prevention initiated:  Yes   Wound Care Orders initiated:  Yes      [id number]  nurse consulted for Pressure Injury (Stage 3,4, Unstageable, DTI, NWPT, and Complex wounds) or Brian score 18 or lower:  Yes     Nurse 1 eSignature: Electronically signed by Heather Ashford RN on 4/2/22 at 4:58 PM EDT    **SHARE this note so that the co-signing nurse is able to place an eSignature**    Nurse 2 eSignature: Electronically signed by Coco Fulton RN on 4/2/22 at 9:55 PM EDT

## 2022-04-02 NOTE — PROGRESS NOTES
Urology Attending Progress Note      Subjective: No new complaints     Vitals:  BP (!) 113/52   Pulse 86   Temp 98 °F (36.7 °C) (Oral)   Resp 16   Ht 5' 6\" (1.676 m)   Wt 158 lb 8.2 oz (71.9 kg)   SpO2 95%   BMI 25.58 kg/m²   Temp  Av.5 °F (36.9 °C)  Min: 98 °F (36.7 °C)  Max: 98.9 °F (37.2 °C)    Intake/Output Summary (Last 24 hours) at 2022 1007  Last data filed at 2022 0859  Gross per 24 hour   Intake 1078 ml   Output 1725 ml   Net -647 ml       Exam: Coates draining clear    Labs:  WBC:    Lab Results   Component Value Date    WBC 8.0 2022     Hemoglobin/Hematocrit:    Lab Results   Component Value Date    HGB 8.2 2022    HCT 24.1 2022     BMP:    Lab Results   Component Value Date     2022    K 3.7 2022    K 3.6 2022    CL 96 2022    CO2 26 2022    BUN 86 2022    LABALBU 2.1 2022    CREATININE 2.0 2022    CALCIUM 8.0 2022    GFRAA 40 2022    LABGLOM 33 2022     PT/INR:    Lab Results   Component Value Date    PROTIME 11.8 2022    INR 1.04 2022     PTT:    Lab Results   Component Value Date    APTT 30.7 2022   [APTT    Urine Culture: Not sent    Blood Culture: Negative    Antibiotic Therapy:  Zosyn, Linezolid     Impression/Plan: 65 yo M with aggressive PCA sp ADT/XRT admitted with AQUILINO and hyponatremia.      -No  complaints, coates draining clear  -Cr improving  -BCx negative  -Continue flomax   -If Cr remains elevated, can obtain new CT to check for stone passage.  For now, continue with coates.  -Call with any questions    CLIF Amin

## 2022-04-02 NOTE — PROGRESS NOTES
Nephrology  Note                                                                                                                                                                                                                                                                                                                                                               Office : [phone]     Fax :[phone]              Patient's Name: Betina Pratt  5:30 PM  4/2/2022    Reason for Consult: AQUILINO  Requesting Physician:  Meet Mayers MD      Chief Complaint:  Danette Siddharthaing        04/02/22     Pt has good UO   Cr better   No diarrhea       Past Medical History:   Diagnosis Date    Asthma     Environmental allergies     Inguinal hernia     Prostate cancer [facility])        Past Surgical History:   Procedure Laterality Date    INGUINAL HERNIA REPAIR Bilateral 6/20/2019    LAPAROSCOPIC BILATERAL INGUINAL HERNIA REPAIR WITH MESH performed by Sofía Sanches MD at [address] [place] [address]      back        History reviewed. No pertinent family history. reports that he has never smoked. He has never used smokeless tobacco. He reports current alcohol use. He reports that he does not use drugs. Allergies:   Other    Current Medications:    aluminum & magnesium hydroxide-simethicone (MAALOX) 200-200-20 MG/5ML suspension 30 mL, Q6H PRN  piperacillin-tazobactam (ZOSYN) 3,375 mg in dextrose 5 % 50 mL IVPB (mini-bag), Q8H  0.9 % sodium chloride infusion, PRN  polyethylene glycol (GLYCOLAX) packet 17 g, Daily  senna (SENOKOT) tablet 8.6 mg, Nightly  tamsulosin (FLOMAX) capsule 0.4 mg, Daily  sodium chloride flush 0.9 % injection 5-40 mL, 2 times per day  sodium chloride flush 0.9 % injection 5-40 mL, PRN  0.9 % sodium chloride infusion, PRN  ondansetron (ZOFRAN-ODT) disintegrating tablet 4 mg, Q8H PRN   Or  ondansetron (ZOFRAN) injection 4 mg, Q6H PRN  acetaminophen (TYLENOL) tablet 650 mg, Q6H PRN Or  acetaminophen (TYLENOL) suppository 650 mg, Q6H PRN  lidocaine 4 % external patch 1 patch, Daily  0.9 % sodium chloride infusion, PRN        Review of Systems:   14 point ROS obtained but were negative except mentioned in HPI      Physical exam:     Vitals:  BP (!) 103/53   Pulse 81   Temp 98.4 °F (36.9 °C) (Oral)   Resp 18   Ht 5' 6\" (1.676 m)   Wt 158 lb 8.2 oz (71.9 kg)   SpO2 96%   BMI 25.58 kg/m²   Constitutional:  OAA X3 NAD  Skin: no rash, turgor wnl  Heent:  eomi, mmm  Neck: no bruits or jvd noted  Cardiovascular:  S1, S2 without m/r/g  Respiratory: CTA B without w/r/r  Abdomen:  +bs, soft, nt, nd  Ext: No lower extremity edema  Psychiatric: mood and affect appropriate  Musculoskeletal:  Rom, muscular strength intact    Data:   Labs:  CBC:   Recent Labs     04/01/22 0411 04/01/22 1819 04/02/22 0429   WBC 9.2 9.3 8.0   HGB 8.6* 8.7* 8.2*   PLT 45* 61* 75*     BMP:    Recent Labs     03/31/22 0325 04/01/22 0411 04/02/22 0429   * 132* 132*   K 3.3* 3.5 3.7   CL 91* 93* 96*   CO2 25 27 26   * 100* 86*   CREATININE 2.7* 2.1* 2.0*   GLUCOSE 141* 129* 121*     Ca/Mg/Phos:   Recent Labs     03/31/22  0325 03/31/22  1010 04/01/22 0411 04/02/22 0429   CALCIUM 7.7*  --  8.1* 8.0*   MG 2.30  --  2.20 2.30   PHOS  --  2.5  --   --      Hepatic:   Recent Labs     03/31/22 0325 04/02/22 0429   * 76*   ALT 71* 69*   BILITOT 1.8* 1.7*   ALKPHOS 273* 388*     Troponin:   No results for input(s): TROPONINI in the last 72 hours. BNP: No results for input(s): BNP in the last 72 hours. Lipids: No results for input(s): CHOL, TRIG, HDL, LDLCALC, LABVLDL in the last 72 hours. ABGs: No results for input(s): PHART, PO2ART, NPV8BPD in the last 72 hours. INR:   No results for input(s): INR in the last 72 hours.   UA:  No results for input(s): Rm Zoie, GLUCOSEU, BILIRUBINUR, Oscar Killian, PROTEINU, UROBILINOGEN, Marcelina Cutting, Fausto Honeye in the last 67 hours.   Urine Microscopic:   No results for input(s): LABCAST, BACTERIA, COMU, HYALCAST, WBCUA, RBCUA, EPIU in the last 72 hours. Urine Culture: No results for input(s): LABURIN in the last 72 hours. Urine Chemistry:   No results for input(s): Annemarie Richardson, PROTEINUR, NAUR in the last 72 hours. IMAGING:  XR ABDOMEN (2 VIEWS)   Final Result      1. Nonobstructive bowel gas pattern with oral contrast from previous administration in the colon and rectum   2. Left basilar airspace disease, possible pleural effusion      XR CHEST PORTABLE   Final Result      Left basilar atelectasis or pneumonia. US RENAL COMPLETE   Final Result      1. Mild to moderate left hydronephrosis, not significantly changed. FL MODIFIED BARIUM SWALLOW W VIDEO   Final Result      1. No laryngeal penetration or aspiration. US ABDOMEN LIMITED   Final Result   IMPRESSION :      Gallbladder polyps, the largest of which measures 6 x 5 mm. One year follow up ultrasound recommended. Left hydronephrosis, as noted on the prior study. Mildly heterogeneous increased hepatic echogenicity. Findings may reflect mild steatosis. US RENAL COMPLETE   Final Result   IMPRESSION :      Gallbladder polyps, the largest of which measures 6 x 5 mm. One year follow up ultrasound recommended. Left hydronephrosis, as noted on the prior study. Mildly heterogeneous increased hepatic echogenicity. Findings may reflect mild steatosis. CT ABDOMEN PELVIS WO CONTRAST Additional Contrast? None   Final Result      1. Moderate left hydronephrosis with an obstructing 3 mm calculus at the left UVJ. 2.  Trace left pleural effusion with associated mild airspace consolidation which could represent atelectasis and/or pneumonia. POC US [facility] [facility]   Final Result      XR CHEST PORTABLE   Final Result   1. No interval changes. Assessment/Plan   AQUILINO  UTI  Cr on admission was 4.9.

## 2022-04-02 NOTE — PROGRESS NOTES
. 0445 bp of 95/48 map of 62. Retake bp is now 100/47 with map of 63. Pt currently has kvo running. MD Shruthi Briseno notified. No new orders at this time. BP cycling [id number].

## 2022-04-03 LAB
ACANTHOCYTES: ABNORMAL
ANION GAP SERPL CALCULATED.3IONS-SCNC: 10 MMOL/L (ref 3–16)
ANISOCYTOSIS: ABNORMAL
BANDED NEUTROPHILS RELATIVE PERCENT: 1 % (ref 0–7)
BASOPHILS ABSOLUTE: 0.1 K/UL (ref 0–0.2)
BASOPHILS RELATIVE PERCENT: 1 %
BUN BLDV-MCNC: 66 MG/DL (ref 7–20)
CALCIUM SERPL-MCNC: 8 MG/DL (ref 8.3–10.6)
CHLORIDE BLD-SCNC: 95 MMOL/L (ref 99–110)
CO2: 28 MMOL/L (ref 21–32)
CREAT SERPL-MCNC: 1.9 MG/DL (ref 0.8–1.3)
EOSINOPHILS ABSOLUTE: 0 K/UL (ref 0–0.6)
EOSINOPHILS RELATIVE PERCENT: 0 %
GFR AFRICAN AMERICAN: 42
GFR NON-AFRICAN AMERICAN: 35
GLUCOSE BLD-MCNC: 114 MG/DL (ref 70–99)
HCT VFR BLD CALC: 23.3 % (ref 40.5–52.5)
HEMOGLOBIN: 7.9 G/DL (ref 13.5–17.5)
HYPERCHROMASIA: ABNORMAL
HYPOCHROMIA: ABNORMAL
LYMPHOCYTES ABSOLUTE: 1.2 K/UL (ref 1–5.1)
LYMPHOCYTES RELATIVE PERCENT: 13 %
MACROCYTES: ABNORMAL
MAGNESIUM: 2.2 MG/DL (ref 1.8–2.4)
MCH RBC QN AUTO: 28.9 PG (ref 26–34)
MCHC RBC AUTO-ENTMCNC: 34 G/DL (ref 31–36)
MCV RBC AUTO: 85 FL (ref 80–100)
METAMYELOCYTES RELATIVE PERCENT: 2 %
MICROCYTES: ABNORMAL
MONOCYTES ABSOLUTE: 0.1 K/UL (ref 0–1.3)
MONOCYTES RELATIVE PERCENT: 1 %
NEUTROPHILS ABSOLUTE: 7.9 K/UL (ref 1.7–7.7)
NEUTROPHILS RELATIVE PERCENT: 82 %
OVALOCYTES: ABNORMAL
PDW BLD-RTO: 16.4 % (ref 12.4–15.4)
PLATELET # BLD: 125 K/UL (ref 135–450)
PMV BLD AUTO: 9.5 FL (ref 5–10.5)
POIKILOCYTES: ABNORMAL
POLYCHROMASIA: ABNORMAL
POTASSIUM SERPL-SCNC: 3.8 MMOL/L (ref 3.5–5.1)
RBC # BLD: 2.74 M/UL (ref 4.2–5.9)
RBC # BLD: NORMAL 10*6/UL
SCHISTOCYTES: ABNORMAL
SODIUM BLD-SCNC: 133 MMOL/L (ref 136–145)
TARGET CELLS: ABNORMAL
WBC # BLD: 9.3 K/UL (ref 4–11)

## 2022-04-03 PROCEDURE — 6370000000 HC RX 637 (ALT 250 FOR IP): Performed by: STUDENT IN AN ORGANIZED HEALTH CARE EDUCATION/TRAINING PROGRAM

## 2022-04-03 PROCEDURE — 85025 COMPLETE CBC W/AUTO DIFF WBC: CPT

## 2022-04-03 PROCEDURE — 80048 BASIC METABOLIC PNL TOTAL CA: CPT

## 2022-04-03 PROCEDURE — 99233 SBSQ HOSP IP/OBS HIGH 50: CPT | Performed by: INTERNAL MEDICINE

## 2022-04-03 PROCEDURE — 2580000003 HC RX 258: Performed by: STUDENT IN AN ORGANIZED HEALTH CARE EDUCATION/TRAINING PROGRAM

## 2022-04-03 PROCEDURE — 2060000000 HC ICU INTERMEDIATE R&B

## 2022-04-03 PROCEDURE — 6360000002 HC RX W HCPCS: Performed by: STUDENT IN AN ORGANIZED HEALTH CARE EDUCATION/TRAINING PROGRAM

## 2022-04-03 PROCEDURE — 83735 ASSAY OF MAGNESIUM: CPT

## 2022-04-03 RX ORDER — POTASSIUM CHLORIDE 20 MEQ/1
20 TABLET, EXTENDED RELEASE ORAL ONCE
Status: COMPLETED | OUTPATIENT
Start: 2022-04-03 | End: 2022-04-03

## 2022-04-03 RX ADMIN — POTASSIUM CHLORIDE 20 MEQ: 20 TABLET, EXTENDED RELEASE ORAL at 08:44

## 2022-04-03 RX ADMIN — TAMSULOSIN HYDROCHLORIDE 0.4 MG: 0.4 CAPSULE ORAL at 08:44

## 2022-04-03 RX ADMIN — SODIUM CHLORIDE, PRESERVATIVE FREE 10 ML: 5 INJECTION INTRAVENOUS at 08:45

## 2022-04-03 RX ADMIN — PIPERACILLIN AND TAZOBACTAM 3375 MG: 3; .375 INJECTION, POWDER, LYOPHILIZED, FOR SOLUTION INTRAVENOUS at 15:02

## 2022-04-03 RX ADMIN — PIPERACILLIN AND TAZOBACTAM 3375 MG: 3; .375 INJECTION, POWDER, LYOPHILIZED, FOR SOLUTION INTRAVENOUS at 06:33

## 2022-04-03 RX ADMIN — PIPERACILLIN AND TAZOBACTAM 3375 MG: 3; .375 INJECTION, POWDER, LYOPHILIZED, FOR SOLUTION INTRAVENOUS at 23:11

## 2022-04-03 RX ADMIN — SODIUM CHLORIDE, PRESERVATIVE FREE 10 ML: 5 INJECTION INTRAVENOUS at 19:36

## 2022-04-03 ASSESSMENT — PAIN SCALES - GENERAL
PAINLEVEL_OUTOF10: 0

## 2022-04-03 NOTE — PROGRESS NOTES
4 Eyes Admission Assessment     I agree as the admission nurse that 2 RN's have performed a thorough Head to Toe Skin Assessment on the patient. ALL assessment sites listed below have been assessed on admission. Areas assessed by both nurses: Malou Gores  [x]   Head, Face, and Ears   [x]   Shoulders, Back, and Chest  [x]   Arms, Elbows, and Hands   [x]   Coccyx, Sacrum, and Ischium  [x]   Legs, Feet, and Heels        Does the Patient have Skin Breakdown?   No         Brian Prevention initiated:  Yes   Wound Care Orders initiated:  Yes      [id number]  nurse consulted for Pressure Injury (Stage 3,4, Unstageable, DTI, NWPT, and Complex wounds) or Brian score 18 or lower:  Yes      Nurse 1 eSignature: Electronically signed by Edwin Castano RN on 4/3/22 at 3:23 PM EDT    **SHARE this note so that the co-signing nurse is able to place an eSignature**    Nurse 2 eSignature: Electronically signed by Sia Villarreal RN on 4/3/22 at 7:22 PM EDT

## 2022-04-03 NOTE — PLAN OF CARE
Problem: Falls - Risk of:  Goal: Will remain free from falls  Description: Will remain free from falls  Outcome: Ongoing  Pt is a High fall risk. See [place] Mort Fall Score and ABCDS Injury Risk assessments.   + Screening for Orthostasis and/or + High Fall Risk per ZHANG/ABCDS: Explained fall risk precautions to pt and family and rationale behind their use to keep the patient safe. Pt bed is in low position, side rails up, call light and belongings are in reach. Fall wristband applied and present on pts wrist.  Chair Alarm on. Pt encouraged to call for assistance. Will continue with hourly rounds for PO intake, pain needs, toileting and repositioning as needed. Problem: Nutritional:  Goal: Consumption of food in small portions  Description: Consumption of food in small portions  Outcome: Ongoing   Pt has been educated on the importance of adequate oral intake and has been tolerating all meals. Pt eats % of meals. Pt is on a dysphagia, minced/moist diet and is tolerating well. Problem: Respiratory:  Goal: Absence of aspiration  Description: Absence of aspiration  Outcome: Ongoing  Pt understands his risk of aspiration and maintains sitting in an upright position for all meals. Pt is on a minced and moist diet. Pt continues to have crackles in bilateral lower lobes, has a productive cough, but is his O2 sat remains about 94. Problem: Safety:  Goal: Ability to demonstrate good, daily oral hygiene techniques will improve  Description: Ability to demonstrate good, daily oral hygiene techniques will improve  Outcome: Ongoing  Pt performs oral hygiene/brushes teeth daily. We will bathe pt prior to the end of shift and perform line care with chg.     Problem: Skin Integrity:  Goal: Absence of new skin breakdown  Description: Absence of new skin breakdown  Outcome: Ongoing   Pt to remain free of skin breakdown during stay, pt encouraged to turn and reposition frequently. Pt has redness on his sacrum and buttocks.

## 2022-04-03 NOTE — PROGRESS NOTES
4 Eyes Admission Assessment     I agree as the admission nurse that 2 RN's have performed a thorough Head to Toe Skin Assessment on the patient. ALL assessment sites listed below have been assessed on admission. Areas assessed by both nurses: Morena Civil and   [x]   Head, Face, and Ears   [x]   Shoulders, Back, and Chest  [x]   Arms, Elbows, and Hands   [x]   Coccyx, Sacrum, and Ischium  [x]   Legs, Feet, and Heels        Does the Patient have Skin Breakdown?   No         Brian Prevention initiated:  Yes   Wound Care Orders initiated:  Yes      [address]  nurse consulted for Pressure Injury (Stage 3,4, Unstageable, DTI, NWPT, and Complex wounds) or Brian score 18 or lower:  Yes      Nurse 1 eSignature: Electronically signed by Misael Velasco RN on 4/3/22 at 7:23 PM EDT    **SHARE this note so that the co-signing nurse is able to place an eSignature**    Nurse 2 eSignature: {Esignature:[id number]}

## 2022-04-03 NOTE — PROGRESS NOTES
4 Eyes Admission Assessment     I agree as the admission nurse that 2 RN's have performed a thorough Head to Toe Skin Assessment on the patient. ALL assessment sites listed below have been assessed on admission. Areas assessed by both nurses: Patsey Poplar  [x]   Head, Face, and Ears   [x]   Shoulders, Back, and Chest  [x]   Arms, Elbows, and Hands   [x]   Coccyx, Sacrum, and Ischium  [x]   Legs, Feet, and Heels        Does the Patient have Skin Breakdown?   No         Brian Prevention initiated:  Yes   Wound Care Orders initiated:  Yes      [address]  nurse consulted for Pressure Injury (Stage 3,4, Unstageable, DTI, NWPT, and Complex wounds) or Brian score 18 or lower:  Yes      Nurse 1 eSignature: Electronically signed by Yaya Tuttle RN on 4/3/22 at 6:58 AM EDT    **SHARE this note so that the co-signing nurse is able to place an eSignature**    Nurse 2 eSignature: Electronically signed by Myah Eagle RN on 4/3/22 at 7:18 AM EDT

## 2022-04-03 NOTE — PROGRESS NOTES
Or  acetaminophen (TYLENOL) suppository 650 mg, Q6H PRN  lidocaine 4 % external patch 1 patch, Daily  0.9 % sodium chloride infusion, PRN        Review of Systems:   14 point ROS obtained but were negative except mentioned in HPI      Physical exam:     Vitals:  /60   Pulse 83   Temp 97.9 °F (36.6 °C) (Oral)   Resp 16   Ht 5' 6\" (1.676 m)   Wt 158 lb 8.2 oz (71.9 kg)   SpO2 97%   BMI 25.58 kg/m²   Constitutional:  OAA X3 NAD  Skin: no rash, turgor wnl  Heent:  eomi, mmm  Neck: no bruits or jvd noted  Cardiovascular:  S1, S2 without m/r/g  Respiratory: CTA B without w/r/r  Abdomen:  +bs, soft, nt, nd  Ext: No lower extremity edema  Psychiatric: mood and affect appropriate  Musculoskeletal:  Rom, muscular strength intact    Data:   Labs:  CBC:   Recent Labs     04/01/22 1819 04/02/22 0429 04/03/22 0412   WBC 9.3 8.0 9.3   HGB 8.7* 8.2* 7.9*   PLT 61* 75* 125*     BMP:    Recent Labs     04/01/22 0411 04/02/22 0429 04/03/22 0412   * 132* 133*   K 3.5 3.7 3.8   CL 93* 96* 95*   CO2 27 26 28   * 86* 66*   CREATININE 2.1* 2.0* 1.9*   GLUCOSE 129* 121* 114*     Ca/Mg/Phos:   Recent Labs     04/01/22  0411 04/02/22 0429 04/03/22 0412   CALCIUM 8.1* 8.0* 8.0*   MG 2.20 2.30 2.20     Hepatic:   Recent Labs     04/02/22 0429   AST 76*   ALT 69*   BILITOT 1.7*   ALKPHOS 388*     Troponin:   No results for input(s): TROPONINI in the last 72 hours. BNP: No results for input(s): BNP in the last 72 hours. Lipids: No results for input(s): CHOL, TRIG, HDL, LDLCALC, LABVLDL in the last 72 hours. ABGs: No results for input(s): PHART, PO2ART, RVQ8IIC in the last 72 hours. INR:   No results for input(s): INR in the last 72 hours. UA:  No results for input(s): Licha Pen, GLUCOSEU, BILIRUBINUR, KETUA, SPECGRAV, BLOODU, PHUR, PROTEINU, UROBILINOGEN, NITRU, LEUKOCYTESUR, Benay Purpura in the last 72 hours.    Urine Microscopic:   No results for input(s): LABCAST, BACTERIA, COMU, HYALCAST, HERNANDO Baker in the last 72 hours. Urine Culture: No results for input(s): LABURIN in the last 72 hours. Urine Chemistry:   No results for input(s): [place], PROTEINUR, NAUR in the last 72 hours. IMAGING:  XR ABDOMEN (2 VIEWS)   Final Result      1. Nonobstructive bowel gas pattern with oral contrast from previous administration in the colon and rectum   2. Left basilar airspace disease, possible pleural effusion      XR CHEST PORTABLE   Final Result      Left basilar atelectasis or pneumonia. US RENAL COMPLETE   Final Result      1. Mild to moderate left hydronephrosis, not significantly changed. FL MODIFIED BARIUM SWALLOW W VIDEO   Final Result      1. No laryngeal penetration or aspiration. US ABDOMEN LIMITED   Final Result   IMPRESSION :      Gallbladder polyps, the largest of which measures 6 x 5 mm. One year follow up ultrasound recommended. Left hydronephrosis, as noted on the prior study. Mildly heterogeneous increased hepatic echogenicity. Findings may reflect mild steatosis. US RENAL COMPLETE   Final Result   IMPRESSION :      Gallbladder polyps, the largest of which measures 6 x 5 mm. One year follow up ultrasound recommended. Left hydronephrosis, as noted on the prior study. Mildly heterogeneous increased hepatic echogenicity. Findings may reflect mild steatosis. CT ABDOMEN PELVIS WO CONTRAST Additional Contrast? None   Final Result      1. Moderate left hydronephrosis with an obstructing 3 mm calculus at the left UVJ. 2.  Trace left pleural effusion with associated mild airspace consolidation which could represent atelectasis and/or pneumonia. POC US [address]   Final Result      XR CHEST PORTABLE   Final Result   1. No interval changes. Assessment/Plan   AQUILINO  UTI  Cr on admission was 4.9. Baseline was 0.9. Patient endorsing left flank pain. Recently having poor PO intake.  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     -Cr  - improving   -Urology consulted- Leblanc placed   - SANTI - obstruction   -Continue abx       Anemia  Hx of prostate cancer with mets. Started radiation therapy on 3/21.  S/p 1 round of hormonal therapy     Acid- base/ Electrolyte imbalance   Hyponatremia   Hypokalemia   - monitor           Recommend to dose adjust all medications  based on renal functions  Maintain SBP> 90 mmHg   Daily weights   AVOID NSAIDs  Avoid Nephrotoxins  Monitor Intake/Output  Call if significant decrease in urine output     Hammad Langley MD

## 2022-04-03 NOTE — PROGRESS NOTES
Progress Note    Admit Date: 3/29/2022  Day: 6  Diet: ADULT DIET; Dysphagia - Minced and Moist  ADULT ORAL NUTRITION SUPPLEMENT; Breakfast, Lunch; Standard High Calorie/High Protein Oral Supplement    CC: Weakness, Hypotension    Interval history:   No acute events overnight. Patient seen and examined at [facility]. Patient has no acute complaints. Denies chest pain sob nvd dysuria. HPI:   Zita Homans is a 66year old Male with a PmHx of prostate cancer with metastasis to the pelvis, asthma, lipoma resection, urinary retention, hernia repair who present to the ED with a chief complaint of hypotension, diarrhea, fatigue. Currently undergoing radiation therapy, but no chemotherapy for prostate cancer and at his last appointment a few days ago he was hypotensive to 70's/40's which improved after a 1L bolus. Last weekend he endorsed non-bloody diarrhea, intermittent cramping abdominal pain, generalized weakness, and N/V which resolved by Sunday. Family endorses increased weakness and decreased PO intake due to general malaise and N/V. He has increasing weakness for the last few weeks but was still able to walk and drive before the weekend, but has worsened since. He just started radiation therapy on 3/21 and has received one dose of hormonal therapy. Not on chemo and no plans for surgery. Does endorse some burning and discomfort upon urination. He was able a weak ago to drive himself home and now he is substantially more weak.      He denies fever, chills, chest pain, shortness of breath, dysuria, hematuria, melena, hematochezia, numbness, unilateral weakness, lower extremity weakness, bowel or bladder incontinence.     In the ED, he is hyponatremic to 121, creatinine 4.9, anion gap of 21, LA 1.2, BNP elevated to 7944 (last echo 11/2021 w/ EF 50% and indeterminate diastolic), , platelets 11, VBG 6.45/60.8/08. No acute changes to chest XR.     Medications:     Scheduled Meds:   piperacillin-tazobactam  3,375 mg IntraVENous Q8H    polyethylene glycol  17 g Oral Daily    senna  1 tablet Oral Nightly    tamsulosin  0.4 mg Oral Daily    sodium chloride flush  5-40 mL IntraVENous 2 times per day    lidocaine  1 patch TransDERmal Daily     Continuous Infusions:   sodium chloride      sodium chloride      sodium chloride       PRN Meds:aluminum & magnesium hydroxide-simethicone, sodium chloride, sodium chloride flush, sodium chloride, ondansetron **OR** ondansetron, acetaminophen **OR** acetaminophen, sodium chloride    Objective:   Vitals:   T-max:  Patient Vitals for the past 8 hrs:   BP Temp Temp src Pulse Resp SpO2   04/03/22 0414 (!) 117/58 98 °F (36.7 °C) Oral 72 14 98 %       Intake/Output Summary (Last 24 hours) at 4/3/2022 0732  Last data filed at 4/3/2022 4440  Gross per 24 hour   Intake 1333 ml   Output 2725 ml   Net -1392 ml       Review of Systems  A full 10 point ROS was performed with pertinent + and - listed above    Physical Exam  Constitutional:       General: He is not in acute distress. Appearance: He is ill-appearing. HENT:      Head: Normocephalic. Right Ear: External ear normal.      Left Ear: External ear normal.      Nose: Nose normal.     Pharynx: Oropharynx is clear. No oropharyngeal exudate. Eyes:      Extraocular Movements: Extraocular movements intact. Conjunctiva/sclera: Conjunctivae normal.      Pupils: Pupils are equal, round, and reactive to light. Cardiovascular:      Rate and Rhythm: Normal rate and regular rhythm. Pulses: Normal pulses. Heart sounds: No friction rub. No gallop. + murmur heard best at the SANTI border  Pulmonary:      Effort: Pulmonary effort is normal. No respiratory distress. Breath sounds: No stridor. No wheezing, rhonchi. Slight crackles at lung bases with decreased breath sounds. Abdominal:      General: There is no distension     Palpations: Abdomen is soft. Tenderness: There is no abdominal tenderness.  There is no right CVA tenderness, left CVA tenderness, guarding or rebound. Musculoskeletal:         General: No deformity. Now has bilateral LE swelling, 2+ pitting edema  Skin:     General: Skin is warm. Coloration: Skin is not jaundiced. Findings: No bruising, erythema or lesion. Neurological:      Mental Status: Alert and oriented x3     Cranial Nerves: No cranial nerve deficit. Motor: Weakness present. Comments: Weakness most prominent in the b/l LE 4/5. Full strength in b/l UE. Difficulty leaning forward. Strength has improved    LABS:    CBC:   Recent Labs     04/01/22  1819 04/02/22 0429 04/03/22 0412   WBC 9.3 8.0 9.3   HGB 8.7* 8.2* 7.9*   HCT 25.7* 24.1* 23.3*   PLT 61* 75* 125*   MCV 85.2 84.7 85.0     Renal:    Recent Labs     03/31/22  1010 04/01/22  0411 04/02/22 0429 04/03/22 0412   NA  --  132* 132* 133*   K  --  3.5 3.7 3.8   CL  --  93* 96* 95*   CO2  --  27 26 28   BUN  --  100* 86* 66*   CREATININE  --  2.1* 2.0* 1.9*   GLUCOSE  --  129* 121* 114*   CALCIUM  --  8.1* 8.0* 8.0*   MG  --  2.20 2.30 2.20   PHOS 2.5  --   --   --    ANIONGAP  --  12 10 10     Hepatic:   Recent Labs     04/02/22 0429   AST 76*   ALT 69*   BILITOT 1.7*   BILIDIR 1.1*   PROT 5.1*   LABALBU 2.1*   ALKPHOS 388*     Troponin:   No results for input(s): TROPONINI in the last 72 hours. BNP: No results for input(s): BNP in the last 72 hours. Lipids: No results for input(s): CHOL, HDL in the last 72 hours. Invalid input(s): LDLCALCU, TRIGLYCERIDE  ABGs:  No results for input(s): PHART, ZNW6SVU, PO2ART, PAZ7XDD, BEART, THGBART, I5OMMWZP, CVI5CWJ in the last 72 hours. INR:   No results for input(s): INR in the last 72 hours. Lactate: No results for input(s): LACTATE in the last 72 hours. Cultures:  -----------------------------------------------------------------  RAD:   XR ABDOMEN (2 VIEWS)   Final Result      1.  Nonobstructive bowel gas pattern with oral contrast from previous administration in the colon and rectum   2. Left basilar airspace disease, possible pleural effusion      XR CHEST PORTABLE   Final Result      Left basilar atelectasis or pneumonia. US RENAL COMPLETE   Final Result      1. Mild to moderate left hydronephrosis, not significantly changed. FL MODIFIED BARIUM SWALLOW W VIDEO   Final Result      1. No laryngeal penetration or aspiration. US ABDOMEN LIMITED   Final Result   IMPRESSION :      Gallbladder polyps, the largest of which measures 6 x 5 mm. One year follow up ultrasound recommended. Left hydronephrosis, as noted on the prior study. Mildly heterogeneous increased hepatic echogenicity. Findings may reflect mild steatosis. US RENAL COMPLETE   Final Result   IMPRESSION :      Gallbladder polyps, the largest of which measures 6 x 5 mm. One year follow up ultrasound recommended. Left hydronephrosis, as noted on the prior study. Mildly heterogeneous increased hepatic echogenicity. Findings may reflect mild steatosis. CT ABDOMEN PELVIS WO CONTRAST Additional Contrast? None   Final Result      1. Moderate left hydronephrosis with an obstructing 3 mm calculus at the left UVJ. 2.  Trace left pleural effusion with associated mild airspace consolidation which could represent atelectasis and/or pneumonia. [address]   Final Result      XR CHEST PORTABLE   Final Result   1. No interval changes. Assessment/Plan:     Librado Escobar is a 66year old Male with a PmHx of prostate cancer with metastasis to the pelvis, asthma, lipoma resection, hernia repair who present to the ED with a chief complaint of hypotension, diarrhea, fatigue.      Hypovolemic hyponatremia likely 2/2 viral gastroenteritis and poor PO intake  Hypotension  Patient is acutely dehydrated and hypotensive, responding to IVF. Diarrhea and N/V over the weekend has since resolved and patient endorse poor PO intake since then.   - Nephrology following, appreciate recs  - Blood culture x2 NGTD, respiratory, urine culture to rule out sepsis  - Continue gentle fluid given reduced PO intake and still recovering from AQUILINO  - Urine studies showed low urine sodium, urine osmol 397 which is mildly low  - Dietician consulted due to poor PO intake, continued concern for dysphagia  - Follow BMP and mag  - PT/OT  - s/p bone marrow biopsy. Concern for AML vs. MDS, awaiting results     AQUILINO  Hx of Urinary retention  Likely 2/2 poor PO intake, N/V, and diarrhea. However he has a hx of urinary retention so it might also be due to obstruction. 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 Likely acute renal failure causing elevated BMP. - Urology consult, appreciate recs  - CT abdomen without contrast showed L renal stone 3mm, obstructing with hydronephrosis. Per urology, give pt a chance to pass it. Renal US shows unchanged hydronephrosis  - Leblanc catheter in place  - Home flomax     Suspected UTI vs. pneumonia  Urinalysis showed 2+ WBC with burining upon urination and hx of retention. Did not meets sepsis criteria. CT chest showed L pleural effusion, possible atelectasis or pneumonia. Did fail swallow eval. Afebrile, normal WBC. - Zosyn (3/30 - ), can discharge on levofloxacin 500 mg x 5 days per ID  - procal >100 -> 66  - Urine, blood, and respiratory culture pending, NGTD  - ID consulted, appreciate recs     Prostate cancer  Started radiation therapy on 3/21. S/p 1 round of hormonal therapy. Not undergoing chemotherapy or plans for surgery  - Consulted urology, appreciate recs  - Consulted oncology, Dr. Corinne Banks, appreciate recs  - Monitor BMP  - Monitor for signs of retention     Thrombocytopenia 2/2 radiation therapy  Platelets 34M today, baseline less than a month ago >400k s/p radiation therapy. Need to rule out TTP/HUS. Concern for sepsis as well.   - Hematology and oncology consulted, appreciate recs  - CBC daily  - Transfuse platelets <58, increased this morning  - Path review, smear  - Haptoglobin mildly elevated  - PT/INR normal, TTP/HUS is on the differential  - PTT and fibrinogen  - Holding anticoag     Anemia  New onset. Baseline is 13. Low iron counts and in the context of thrombocytopenia and recent radiation treatments.    - Blood smear showed dysplasia  - Normal MCV and ferritin but low iron counts and iron sat which could be consistent with anemia of chronic disease, however the drop is acute  - Monitor CBC    Anion gap metabolic acidosis - resolved  No clear cause with initial LA normal. Respiratory compensation.  - Denies alcohol use  - Anion gap closed to 16 on recheck     Transaminitis  Recent diarrhea and N/V with elevated levels  - Abdominal US showed mild steatosis and GB polyps  - liver panels: stable,d/c trend     Chronic back pain  - Fentanyl patch      Code Status: Full code  FEN: Dysphagia diet, npo at midnight for potential EGD Monday pending GIs evaluation  PPX: SCD  DISPO: DARRON Feldman DO, PGY-2  04/03/22  7:32 AM    This patient has been staffed and discussed with Gene Palacios MD.

## 2022-04-03 NOTE — PLAN OF CARE
Problem: Falls - Risk of:  Goal: Will remain free from falls  Description: Will remain free from falls  4/2/2022 2224 by Annetta Rodriguez RN  Outcome: Ongoing   Orthostatic vital signs obtained at start of shift - see flowsheet for details. Pt does not meet criteria for orthostasis. Pt is a High fall risk. See [place] Heart Fall Score and ABCDS Injury Risk assessments.   + Screening for Orthostasis and/or + High Fall Risk per ZHANG/ABCDS: Explained fall risk precautions to pt and family and rationale behind their use to keep the patient safe. Pt bed is in low position, side rails up, call light and belongings are in reach. Fall wristband applied and present on pts wrist.  Bed alarm on. Pt encouraged to call for assistance. Will continue with hourly rounds for PO intake, pain needs, toileting and repositioning as needed. Problem: Nutritional:  Goal: Ability to tolerate tube feedings without aspirating will improve  Description: Ability to tolerate tube feedings without aspirating will improve  4/2/2022 2224 by Annetta Rodriguez RN  Outcome: Ongoing   Patient on minced/moist diet, tolerating well. Will continue to monitor and assess. Problem: Respiratory:  Goal: Ability to maintain a clear airway will improve  Description: Ability to maintain a clear airway will improve  4/2/2022 2224 by Annetta Rodriguez RN  Outcome: Ongoing   Patient maintaining an occasional productive cough. Patient clearing sputum regularly. O2 sats remain mid-upper 90's on RA. Will continue to monitor and assess. Problem: Skin Integrity:  Goal: Will show no infection signs and symptoms  Description: Will show no infection signs and symptoms  4/2/2022 2224 by Annetta Rodriguez RN  Outcome: Ongoing   Patient has no new signs of abnormal redness or skin breakdown. Patient repositioned q2 hours with pillow support as per low katelyn score. Will continue to monitor and assess.     Problem: Pain:  Goal: Pain level will decrease  Description: Pain level will decrease  4/2/2022 2224 by Sharee Corley RN  Outcome: Ongoing   Patient had no complaints of pain this shift. Lidocaine patch removed as scheduled. Will continue to monitor and assess.

## 2022-04-04 ENCOUNTER — TELEPHONE (OUTPATIENT)
Dept: ORTHOPEDIC SURGERY | Age: 72
End: 2022-04-04

## 2022-04-04 ENCOUNTER — APPOINTMENT (OUTPATIENT)
Dept: CT IMAGING | Age: 72
DRG: 682 | End: 2022-04-04
Payer: MEDICARE

## 2022-04-04 VITALS
HEART RATE: 91 BPM | SYSTOLIC BLOOD PRESSURE: 109 MMHG | OXYGEN SATURATION: 93 % | BODY MASS INDEX: 25.47 KG/M2 | HEIGHT: 66 IN | TEMPERATURE: 98.8 F | RESPIRATION RATE: 21 BRPM | WEIGHT: 158.51 LBS | DIASTOLIC BLOOD PRESSURE: 54 MMHG

## 2022-04-04 LAB
ABO/RH: NORMAL
ANION GAP SERPL CALCULATED.3IONS-SCNC: 6 MMOL/L (ref 3–16)
ANISOCYTOSIS: ABNORMAL
ANTIBODY SCREEN: NORMAL
BANDED NEUTROPHILS RELATIVE PERCENT: 4 % (ref 0–7)
BASOPHILS ABSOLUTE: 0.1 K/UL (ref 0–0.2)
BASOPHILS RELATIVE PERCENT: 1 %
BUN BLDV-MCNC: 52 MG/DL (ref 7–20)
CALCIUM SERPL-MCNC: 8 MG/DL (ref 8.3–10.6)
CHLORIDE BLD-SCNC: 95 MMOL/L (ref 99–110)
CO2: 29 MMOL/L (ref 21–32)
CREAT SERPL-MCNC: 1.6 MG/DL (ref 0.8–1.3)
EOSINOPHILS ABSOLUTE: 0 K/UL (ref 0–0.6)
EOSINOPHILS RELATIVE PERCENT: 0 %
GFR AFRICAN AMERICAN: 52
GFR NON-AFRICAN AMERICAN: 43
GLUCOSE BLD-MCNC: 116 MG/DL (ref 70–99)
HCT VFR BLD CALC: 22.3 % (ref 40.5–52.5)
HEMOGLOBIN: 7.6 G/DL (ref 13.5–17.5)
HYPOCHROMIA: ABNORMAL
LYMPHOCYTES ABSOLUTE: 0.8 K/UL (ref 1–5.1)
LYMPHOCYTES RELATIVE PERCENT: 8 %
MAGNESIUM: 2 MG/DL (ref 1.8–2.4)
MCH RBC QN AUTO: 28.8 PG (ref 26–34)
MCHC RBC AUTO-ENTMCNC: 33.9 G/DL (ref 31–36)
MCV RBC AUTO: 85.1 FL (ref 80–100)
METAMYELOCYTES RELATIVE PERCENT: 3 %
MONOCYTES ABSOLUTE: 0.2 K/UL (ref 0–1.3)
MONOCYTES RELATIVE PERCENT: 2 %
NEUTROPHILS ABSOLUTE: 9.4 K/UL (ref 1.7–7.7)
NEUTROPHILS RELATIVE PERCENT: 82 %
PDW BLD-RTO: 15.7 % (ref 12.4–15.4)
PLATELET # BLD: 186 K/UL (ref 135–450)
PMV BLD AUTO: 9.1 FL (ref 5–10.5)
POTASSIUM SERPL-SCNC: 3.8 MMOL/L (ref 3.5–5.1)
RBC # BLD: 2.62 M/UL (ref 4.2–5.9)
SODIUM BLD-SCNC: 130 MMOL/L (ref 136–145)
WBC # BLD: 10.6 K/UL (ref 4–11)

## 2022-04-04 PROCEDURE — 86850 RBC ANTIBODY SCREEN: CPT

## 2022-04-04 PROCEDURE — 99232 SBSQ HOSP IP/OBS MODERATE 35: CPT | Performed by: INTERNAL MEDICINE

## 2022-04-04 PROCEDURE — 83735 ASSAY OF MAGNESIUM: CPT

## 2022-04-04 PROCEDURE — 97535 SELF CARE MNGMENT TRAINING: CPT

## 2022-04-04 PROCEDURE — 85025 COMPLETE CBC W/AUTO DIFF WBC: CPT

## 2022-04-04 PROCEDURE — 51798 US URINE CAPACITY MEASURE: CPT

## 2022-04-04 PROCEDURE — 74176 CT ABD & PELVIS W/O CONTRAST: CPT

## 2022-04-04 PROCEDURE — 97530 THERAPEUTIC ACTIVITIES: CPT

## 2022-04-04 PROCEDURE — 6360000002 HC RX W HCPCS: Performed by: STUDENT IN AN ORGANIZED HEALTH CARE EDUCATION/TRAINING PROGRAM

## 2022-04-04 PROCEDURE — 97116 GAIT TRAINING THERAPY: CPT

## 2022-04-04 PROCEDURE — 99233 SBSQ HOSP IP/OBS HIGH 50: CPT | Performed by: INTERNAL MEDICINE

## 2022-04-04 PROCEDURE — 86901 BLOOD TYPING SEROLOGIC RH(D): CPT

## 2022-04-04 PROCEDURE — 6370000000 HC RX 637 (ALT 250 FOR IP): Performed by: STUDENT IN AN ORGANIZED HEALTH CARE EDUCATION/TRAINING PROGRAM

## 2022-04-04 PROCEDURE — 2580000003 HC RX 258: Performed by: STUDENT IN AN ORGANIZED HEALTH CARE EDUCATION/TRAINING PROGRAM

## 2022-04-04 PROCEDURE — 86900 BLOOD TYPING SEROLOGIC ABO: CPT

## 2022-04-04 PROCEDURE — 80048 BASIC METABOLIC PNL TOTAL CA: CPT

## 2022-04-04 RX ORDER — HEPARIN SODIUM 5000 [USP'U]/ML
5000 INJECTION, SOLUTION INTRAVENOUS; SUBCUTANEOUS EVERY 8 HOURS SCHEDULED
Status: DISCONTINUED | OUTPATIENT
Start: 2022-04-04 | End: 2022-04-04 | Stop reason: HOSPADM

## 2022-04-04 RX ORDER — FUROSEMIDE 10 MG/ML
20 INJECTION INTRAMUSCULAR; INTRAVENOUS ONCE
Status: COMPLETED | OUTPATIENT
Start: 2022-04-04 | End: 2022-04-04

## 2022-04-04 RX ORDER — LEVOFLOXACIN 500 MG/1
500 TABLET, FILM COATED ORAL DAILY
Qty: 7 TABLET | Refills: 0 | Status: SHIPPED | OUTPATIENT
Start: 2022-04-04 | End: 2022-04-09

## 2022-04-04 RX ORDER — PANTOPRAZOLE SODIUM 40 MG/1
40 TABLET, DELAYED RELEASE ORAL
Qty: 60 TABLET | Refills: 2 | Status: SHIPPED | OUTPATIENT
Start: 2022-04-04 | End: 2022-06-14 | Stop reason: ALTCHOICE

## 2022-04-04 RX ADMIN — FUROSEMIDE 20 MG: 10 INJECTION, SOLUTION INTRAMUSCULAR; INTRAVENOUS at 08:55

## 2022-04-04 RX ADMIN — PIPERACILLIN AND TAZOBACTAM 3375 MG: 3; .375 INJECTION, POWDER, LYOPHILIZED, FOR SOLUTION INTRAVENOUS at 06:44

## 2022-04-04 RX ADMIN — PIPERACILLIN AND TAZOBACTAM 3375 MG: 3; .375 INJECTION, POWDER, LYOPHILIZED, FOR SOLUTION INTRAVENOUS at 14:22

## 2022-04-04 RX ADMIN — TAMSULOSIN HYDROCHLORIDE 0.4 MG: 0.4 CAPSULE ORAL at 08:55

## 2022-04-04 RX ADMIN — SODIUM CHLORIDE, PRESERVATIVE FREE 10 ML: 5 INJECTION INTRAVENOUS at 08:55

## 2022-04-04 RX ADMIN — HEPARIN SODIUM 5000 UNITS: 5000 INJECTION INTRAVENOUS; SUBCUTANEOUS at 14:22

## 2022-04-04 RX ADMIN — HEPARIN SODIUM 5000 UNITS: 5000 INJECTION INTRAVENOUS; SUBCUTANEOUS at 08:55

## 2022-04-04 ASSESSMENT — PAIN SCALES - GENERAL
PAINLEVEL_OUTOF10: 0

## 2022-04-04 NOTE — PROGRESS NOTES
Consulted for low Brian score and blanchable redness on sacrum, Brian Order set in, pt possible discharge today, Wound Care to sign off.

## 2022-04-04 NOTE — PROGRESS NOTES
ID Follow-up NOTE    CC:   Hypotension, elevated procalcitonin  Antibiotics: Zosyn    Admit Date: 3/29/2022  Hospital Day: 7    Subjective:     Patient reports he feels good, 'stronger'  No specific complaint      Objective:     Patient Vitals for the past 8 hrs:   BP Temp Temp src Pulse Resp SpO2   04/04/22 0745 121/66 97.5 °F (36.4 °C) Oral 83 18 99 %     I/O last 3 completed shifts: In: 8086 [P.O.:1440; I.V.:400]  Out: 8759 [Urine:3950]  I/O this shift:  In: 240 [P.O.:240]  Out: 350 [Urine:350]    EXAM:  GENERAL: No apparent distress. RA  HEENT: Membranes moist, no oral lesion  NECK:  Supple, no lymphadenopathy  LUNGS: Clear b/l, no rales, no dullness  CARDIAC: RRR, no murmur appreciated  ABD:  + BS, soft / NT  EXT:  No rash, no edema, no lesions  NEURO: No focal neurologic findings  PSYCH: Orientation, sensorium, mood normal  LINES:  Peripheral iv       Data Review:  Lab Results   Component Value Date    WBC 10.6 04/04/2022    HGB 7.6 (L) 04/04/2022    HCT 22.3 (L) 04/04/2022    MCV 85.1 04/04/2022     04/04/2022     Lab Results   Component Value Date    CREATININE 1.6 (H) 04/04/2022    BUN 52 (H) 04/04/2022     (L) 04/04/2022    K 3.8 04/04/2022    CL 95 (L) 04/04/2022    CO2 29 04/04/2022       Hepatic Function Panel:   Lab Results   Component Value Date    ALKPHOS 388 04/02/2022    ALT 69 04/02/2022    AST 76 04/02/2022    PROT 5.1 04/02/2022    BILITOT 1.7 04/02/2022    BILIDIR 1.1 04/02/2022    IBILI 0.6 04/02/2022    LABALBU 2.1 04/02/2022       Micro:  3/29 BC no growth   3/29 SARS CoV-2 NAAT neg  3/30 Resp cult - GS -no WBC, no organism; cult - normal resp cee     Imaging:   3/29 Abd / pelvic CT  1.  Moderate left hydronephrosis with an obstructing 3 mm calculus at the left UVJ. 2.  Trace left pleural effusion with associated mild airspace consolidation which could represent atelectasis and/or pneumonia.      3/29 Abd u/s   Gallbladder polyps, the largest of which measures 6 x 5 mm. One year follow up ultrasound recommended. Left hydronephrosis, as noted on the prior study. Mildly heterogeneous increased hepatic echogenicity. Findings may reflect mild steatosis.      3/29 CXR   No active airspace disease or effusion   No congestive changes      3/30 MBS  'No laryngeal penetration or aspiration'     3/31 Renal u/s  'Mild to moderate left hydronephrosis, not significantly changed.'    4/1 CXR 'left basilar atelectasis or pneumonia'    4/4 Abd / pelvic CT  1.  Interval passage of the left ureteral calculus into the bladder. 2.   Moderate left hydronephrosis, slightly decreased. 3.  Persistent left pleural effusion with overlying airspace disease.    4.  Unchanged indeterminate small sclerotic foci in the pelvis metastatic disease included in the differential.       Scheduled Meds:   heparin (porcine)  5,000 Units SubCUTAneous 3 times per day    piperacillin-tazobactam  3,375 mg IntraVENous Q8H    polyethylene glycol  17 g Oral Daily    senna  1 tablet Oral Nightly    tamsulosin  0.4 mg Oral Daily    sodium chloride flush  5-40 mL IntraVENous 2 times per day    lidocaine  1 patch TransDERmal Daily       Continuous Infusions:   sodium chloride      sodium chloride      sodium chloride         PRN Meds:  aluminum & magnesium hydroxide-simethicone, sodium chloride, sodium chloride flush, sodium chloride, ondansetron **OR** ondansetron, acetaminophen **OR** acetaminophen, sodium chloride      Assessment:     Hx advanced, metastatic prostate cancer, receiving XRT  Hx urinary retention     Presented with fatigue, loose stool   Hypotension - BP up today  AQUILINO - Cr down further today  Severe thrombocytopenia     L nephrolithiasis  Procalcitonin >100, repeat 65 (4/1)     No pneumonia  Poss UTI - no urine ordered from ED, has stones, catheter  Afebrile     Plan:     Cont Zosyn  Await BM results  Monitor    At discharge, give Levofloxacin 500 mg po daily x 5 days    Medical Decision Making:   The following items were considered in medical decision making:  Discussion of patient care with other providers  Reviewed clinical lab tests  Reviewed radiology tests  Reviewed other diagnostic tests/interventions  Independent review of radiologic images  Microbiology cultures and other micro tests reviewed      Discussed with pt  Francisco Aceves MD

## 2022-04-04 NOTE — PROGRESS NOTES
[address] [place] [address]  GI Progress Note          Kam Henderson is a 66 y.o. male patient. 1. Hypovolemia    2. AQUILINO (acute kidney injury) ([facility]    3. Thrombocytopenia ([facility]    4. Increased anion gap metabolic acidosis        Admit Date: 3/29/2022    Subjective:       Eating better no N/V having BM's    Scheduled Meds:   piperacillin-tazobactam  3,375 mg IntraVENous Q8H    polyethylene glycol  17 g Oral Daily    senna  1 tablet Oral Nightly    tamsulosin  0.4 mg Oral Daily    sodium chloride flush  5-40 mL IntraVENous 2 times per day    lidocaine  1 patch TransDERmal Daily     Objective:       Patient Vitals for the past 24 hrs:   BP Temp Temp src Pulse Resp SpO2   22 0325 (!) 111/57 98.1 °F (36.7 °C) Oral 79 11 98 %   22 2258 122/64 98.8 °F (37.1 °C) Oral 80 12 97 %   22 1935 (!) 118/59 99.1 °F (37.3 °C) Oral 81 15 98 %   22 1503 (!) 103/56 98.4 °F (36.9 °C) Oral 87 18 94 %   22 1203 102/60 97.9 °F (36.6 °C) Oral 83 16 97 %   22 0810 118/64 98 °F (36.7 °C) Oral 81 16 96 %       Exam:  VITALS:  BP (!) 111/57   Pulse 79   Temp 98.1 °F (36.7 °C) (Oral)   Resp 11   Ht 5' 6\" (1.676 m)   Wt 158 lb 8.2 oz (71.9 kg)   SpO2 98%   BMI 25.58 kg/m²   TEMPERATURE:  Current - Temp: 98.1 °F (36.7 °C);  Max - Temp  Av.4 °F (36.9 °C)  Min: 97.9 °F (36.6 °C)  Max: 99.1 °F (37.3 °C)    NAD  General appearance: alert, appears stated age, cooperative and no distress  Abdomen: soft, non-tender; bowel sounds normal; no masses,  no organomegaly  AAOx3, No asterixis or encephalopathy    Recent Labs     22   WBC 8.0 9.3 10.6   HGB 8.2* 7.9* 7.6*   HCT 24.1* 23.3* 22.3*   MCV 84.7 85.0 85.1   PLT 75* 125* 186     Recent Labs     22   * 133* 130*   K 3.7 3.8 3.8   CL 96* 95* 95*   CO2 26 28 29   BUN 86* 66* 52*   CREATININE 2.0* 1.9* 1.6*     Recent Labs     22   AST 76*   ALT 69* BILIDIR 1.1*   BILITOT 1.7*   ALKPHOS 388*         Assessment:       Principal Problem:    AQUILINO (acute kidney injury) ([facility]  Active Problems:    Hypovolemia    Increased anion gap metabolic acidosis    Thrombocytopenia (HCC)    Prostate cancer (HCC)  Resolved Problems:    * No resolved hospital problems.  *      Eating better having BM's  Liver chem stable/improved    Recommendations:       PPI  Follow up in office in 2-3 weeks to reassess  Will sign off recall GI if needed    Herbie Canavan, MD  4/4/2022  7:22 AM

## 2022-04-04 NOTE — CARE COORDINATION
8:19 AM  SNF referral pending at [facility]. 11:39 AM   The [facility] does not have bed availability. Referral to [facility]. Per Rad Once note this morning, patient will be resuming radiation. Patient will be unable to go to a facility if he chooses to continue radiation. Patient is currently off the floor. Will meet with patient when he returns to see if he is able to go home with family or if he wishes to pursue SNF.    1:20 PM  Patient is agreeable to returning home with family. His brother, Drucilla Mcardle, is agreeable to taking the patient home with home health care at discharge. Referral made to [facility] for a rolling walker. This will be delivered prior to the patient discharging. Home care arranged with [facility] and the patient's brother's address was provided. Patient's brother is aware that the patient will have daily radiation moving forward and is agreeable to transporting. All are in agreement to the patient's discharge plan.

## 2022-04-04 NOTE — TELEPHONE ENCOUNTER
I called and spoke with Dionte Navarro and let her know that I believe they have the wrong Dr. Danica Menendez.  I let them know that the patient had seen Dr. Heather Watson in the hospital.

## 2022-04-04 NOTE — PLAN OF CARE
Problem: Falls - Risk of:  Goal: Will remain free from falls  Description: Will remain free from falls  Outcome: Completed  Goal: Absence of physical injury  Description: Absence of physical injury  Outcome: Completed     Problem: Nutritional:  Goal: Ability to tolerate tube feedings without aspirating will improve  Description: Ability to tolerate tube feedings without aspirating will improve  Outcome: Completed  Goal: Consumption of food in small portions  Description: Consumption of food in small portions  Outcome: Completed  Goal: Consumption of liquid of appropriate consistency  Description: Consumption of liquid of appropriate consistency  Outcome: Completed     Problem: Respiratory:  Goal: Ability to maintain a clear airway will improve  Description: Ability to maintain a clear airway will improve  Outcome: Completed  Goal: Will remain free from infection  Description: Will remain free from infection  Outcome: Completed  Goal: Absence of aspiration  Description: Absence of aspiration  Outcome: Completed     Problem: Safety:  Goal: Ability to chew and swallow food without choking will improve  Description: Ability to chew and swallow food without choking will improve  Outcome: Completed  Goal: Ability to demonstrate good, daily oral hygiene techniques will improve  Description: Ability to demonstrate good, daily oral hygiene techniques will improve  Outcome: Completed  Goal: Maintenance of upright position during and after feeding  Description: Maintenance of upright position during and after feeding  Outcome: Completed  Goal: Signs and symptoms of aspiration will decrease  Description: Signs and symptoms of aspiration will decrease  Outcome: Completed     Problem: Skin Integrity:  Goal: Will show no infection signs and symptoms  Description: Will show no infection signs and symptoms  Outcome: Completed  Goal: Absence of new skin breakdown  Description: Absence of new skin breakdown  Outcome: Completed Problem: Nutrition  Goal: Optimal nutrition therapy  Outcome: Completed     Problem: Pain:  Goal: Pain level will decrease  Description: Pain level will decrease  Outcome: Completed  Goal: Control of acute pain  Description: Control of acute pain  Outcome: Completed  Goal: Control of chronic pain  Description: Control of chronic pain  Outcome: Completed

## 2022-04-04 NOTE — PROGRESS NOTES
Progress Note    Admit Date: 3/29/2022  Day: 7  Diet: Diet NPO    CC: Weakness, Hypotension    Interval history:   NAEO, BP remained stable  Seen and assessed at bedside  Patient is alert this morning and has orientation x3  Good I/O, able to take good PO intake without difficulty. Leblanc catheter in, will do voiding trial  Strength and BP are improved, creatinine and platelets are improving  Patient denies fevers, chills, SOB, chest or abdominal pain, N/V, diarrhea. One BM yesterday    HPI:   Michael Spears is a 66year old Male with a PmHx of prostate cancer with metastasis to the pelvis, asthma, lipoma resection, urinary retention, hernia repair who present to the ED with a chief complaint of hypotension, diarrhea, fatigue. Currently undergoing radiation therapy, but no chemotherapy for prostate cancer and at his last appointment a few days ago he was hypotensive to 70's/40's which improved after a 1L bolus. Last weekend he endorsed non-bloody diarrhea, intermittent cramping abdominal pain, generalized weakness, and N/V which resolved by Sunday. Family endorses increased weakness and decreased PO intake due to general malaise and N/V. He has increasing weakness for the last few weeks but was still able to walk and drive before the weekend, but has worsened since. He just started radiation therapy on 3/21 and has received one dose of hormonal therapy. Not on chemo and no plans for surgery. Does endorse some burning and discomfort upon urination.  He was able a weak ago to drive himself home and now he is substantially more weak.      He denies fever, chills, chest pain, shortness of breath, dysuria, hematuria, melena, hematochezia, numbness, unilateral weakness, lower extremity weakness, bowel or bladder incontinence.     In the ED, he is hyponatremic to 121, creatinine 4.9, anion gap of 21, LA 1.2, BNP elevated to 7944 (last echo 11/2021 w/ EF 50% and indeterminate diastolic), , platelets 11, VBG 7.34/30.9/41. No acute changes to chest XR. Medications:     Scheduled Meds:   piperacillin-tazobactam  3,375 mg IntraVENous Q8H    polyethylene glycol  17 g Oral Daily    senna  1 tablet Oral Nightly    tamsulosin  0.4 mg Oral Daily    sodium chloride flush  5-40 mL IntraVENous 2 times per day    lidocaine  1 patch TransDERmal Daily     Continuous Infusions:   sodium chloride      sodium chloride      sodium chloride       PRN Meds:aluminum & magnesium hydroxide-simethicone, sodium chloride, sodium chloride flush, sodium chloride, ondansetron **OR** ondansetron, acetaminophen **OR** acetaminophen, sodium chloride    Objective:   Vitals:   T-max:  Patient Vitals for the past 8 hrs:   BP Temp Temp src Pulse Resp SpO2   04/04/22 0325 (!) 111/57 98.1 °F (36.7 °C) Oral 79 11 98 %   04/03/22 2258 122/64 98.8 °F (37.1 °C) Oral 80 12 97 %       Intake/Output Summary (Last 24 hours) at 4/4/2022 5580  Last data filed at 4/3/2022 2312  Gross per 24 hour   Intake 1725 ml   Output 2600 ml   Net -875 ml       Review of Systems  A full 10 point ROS was performed with pertinent + and - listed above    Physical Exam  Constitutional:       General: He is not in acute distress. HENT:      Head: Normocephalic. Right Ear: External ear normal.      Left Ear: External ear normal.      Nose: Nose normal.     Pharynx: Oropharynx is clear. No oropharyngeal exudate. Eyes:      Extraocular Movements: Extraocular movements intact. Conjunctiva/sclera: Conjunctivae normal.      Pupils: Pupils are equal, round, and reactive to light. Cardiovascular:      Rate and Rhythm: Normal rate and regular rhythm. Pulses: Normal pulses. Heart sounds: No friction rub. No gallop. + murmur heard best at the SANTI border  Pulmonary:      Effort: Pulmonary effort is normal. No respiratory distress. Breath sounds: No stridor. No wheezing, rhonchi. Slight crackles at lung bases with decreased breath sounds.   Abdominal: General: There is no distension     Palpations: Abdomen is soft. Tenderness: There is no abdominal tenderness. There is no right CVA tenderness, left CVA tenderness, guarding or rebound. Musculoskeletal:         General: No deformity. Now has bilateral LE swelling, 2+ pitting edema  Skin:     General: Skin is warm. Coloration: Skin is not jaundiced. Findings: No bruising, erythema or lesion. Neurological:      Mental Status: Alert and oriented x3     Cranial Nerves: No cranial nerve deficit. Motor: Weakness present. Comments: Weakness most prominent in the b/l LE 4/5. Full strength in b/l UE. Difficulty leaning forward. Strength has improved    LABS:    CBC:   Recent Labs     04/02/22 0429 04/03/22 0412 04/04/22 0349   WBC 8.0 9.3 10.6   HGB 8.2* 7.9* 7.6*   HCT 24.1* 23.3* 22.3*   PLT 75* 125* 186   MCV 84.7 85.0 85.1     Renal:    Recent Labs     04/02/22 0429 04/03/22 0412 04/04/22 0349   * 133* 130*   K 3.7 3.8 3.8   CL 96* 95* 95*   CO2 26 28 29   BUN 86* 66* 52*   CREATININE 2.0* 1.9* 1.6*   GLUCOSE 121* 114* 116*   CALCIUM 8.0* 8.0* 8.0*   MG 2.30 2.20 2.00   ANIONGAP 10 10 6     Hepatic:   Recent Labs     04/02/22 0429   AST 76*   ALT 69*   BILITOT 1.7*   BILIDIR 1.1*   PROT 5.1*   LABALBU 2.1*   ALKPHOS 388*     Troponin:   No results for input(s): TROPONINI in the last 72 hours. BNP: No results for input(s): BNP in the last 72 hours. Lipids: No results for input(s): CHOL, HDL in the last 72 hours. Invalid input(s): LDLCALCU, TRIGLYCERIDE  ABGs:  No results for input(s): PHART, XNQ4ETW, PO2ART, ALV5DFB, BEART, THGBART, Q3OVUDAY, FLC8AAR in the last 72 hours. INR:   No results for input(s): INR in the last 72 hours. Lactate: No results for input(s): LACTATE in the last 72 hours. Cultures:  -----------------------------------------------------------------  RAD:   XR ABDOMEN (2 VIEWS)   Final Result      1.  Nonobstructive bowel gas pattern with oral contrast from previous administration in the colon and rectum   2. Left basilar airspace disease, possible pleural effusion      XR CHEST PORTABLE   Final Result      Left basilar atelectasis or pneumonia. US RENAL COMPLETE   Final Result      1. Mild to moderate left hydronephrosis, not significantly changed. FL MODIFIED BARIUM SWALLOW W VIDEO   Final Result      1. No laryngeal penetration or aspiration. US ABDOMEN LIMITED   Final Result   IMPRESSION :      Gallbladder polyps, the largest of which measures 6 x 5 mm. One year follow up ultrasound recommended. Left hydronephrosis, as noted on the prior study. Mildly heterogeneous increased hepatic echogenicity. Findings may reflect mild steatosis. US RENAL COMPLETE   Final Result   IMPRESSION :      Gallbladder polyps, the largest of which measures 6 x 5 mm. One year follow up ultrasound recommended. Left hydronephrosis, as noted on the prior study. Mildly heterogeneous increased hepatic echogenicity. Findings may reflect mild steatosis. CT ABDOMEN PELVIS WO CONTRAST Additional Contrast? None   Final Result      1. Moderate left hydronephrosis with an obstructing 3 mm calculus at the left UVJ. 2.  Trace left pleural effusion with associated mild airspace consolidation which could represent atelectasis and/or pneumonia. POC US [facility]   Final Result      XR CHEST PORTABLE   Final Result   1. No interval changes. Assessment/Plan:     Rafita Medrano is a 66year old Male with a PmHx of prostate cancer with metastasis to the pelvis, asthma, lipoma resection, hernia repair who present to the ED with a chief complaint of hypotension, diarrhea, fatigue.      Hypovolemic hyponatremia likely 2/2 viral gastroenteritis and poor PO intake  Hypotension  Patient is acutely dehydrated and hypotensive, responding to IVF.  Diarrhea and N/V over the weekend has since resolved and patient endorse poor PO intake since then. - Nephrology consulted, appreciate recs  - Blood culture x2, respiratory, urine culture, no growth on any cultures after 4 days  - Off IVF with good PO intake and improvement in AQUILINO  - Dietician consulted due to poor PO intake, continued concern for dysphagia  - Follow BMP and mag  - PT/OT  - s/p bone marrow biopsy. Concern for AML vs. MDS, awaiting results  - IV lasix 20 mg once for fluid overload due to IVF hydration     AQUILINO  Hx of Urinary retention  Likely 2/2 poor PO intake, N/V, and diarrhea. However he has a hx of urinary retention so it might also be due to obstruction. 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 Likely acute renal failure causing elevated BMP. - Urology consult, appreciate recs  - CT abdomen without contrast showed L renal stone 3mm, obstructing with hydronephrosis. Per urology, give pt a chance to pass it. Renal US shows unchanged hydronephrosis  - Leblanc catheter in place, voiding trial today  - Home flomax     Suspected UTI vs. pneumonia  Urinalysis showed 2+ WBC with burining upon urination and hx of retention. Did not meets sepsis criteria. CT chest showed L pleural effusion, possible atelectasis or pneumonia. Did fail swallow eval. Afebrile, normal WBC. - Zosyn (3/30 - ), can discharge on levofloxacin 500 mg x 5 days  - procal >100 -> 66  - Urine, blood, and respiratory culture pending, NGTD  - ID consulted, appreciate recs     Prostate cancer  Started radiation therapy on 3/21. S/p 1 round of hormonal therapy. Not undergoing chemotherapy or plans for surgery  - Consulted urology, appreciate recs  - Consulted oncology, Dr. Ana Cristina Camacho, appreciate recs  - Monitor BMP  - Monitor for signs of retention     Thrombocytopenia 2/2 radiation therapy vs. Sepsis - resolved  Platelets 22P today, baseline less than a month ago >400k s/p radiation therapy. Need to rule out TTP/HUS. Concern for sepsis as well.   - Hematology and

## 2022-04-04 NOTE — PLAN OF CARE
Problem: Falls - Risk of:  Goal: Will remain free from falls  Description: Will remain free from falls  4/3/2022 2115 by Yaya Tuttle RN  Outcome: Ongoing   Orthostatic vital signs obtained at start of shift - see flowsheet for details. Pt does not meet criteria for orthostasis. Pt is a High fall risk. See Erin Holloway Fall Score and ABCDS Injury Risk assessments.   + Screening for Orthostasis and/or + High Fall Risk per ZHANG/ABCDS: Explained fall risk precautions to pt and family and rationale behind their use to keep the patient safe. Pt bed is in low position, side rails up, call light and belongings are in reach. Fall wristband applied and present on pts wrist.  Bed alarm on. Pt encouraged to call for assistance. Will continue with hourly rounds for PO intake, pain needs, toileting and repositioning as needed. Problem: Respiratory:  Goal: Ability to maintain a clear airway will improve  Description: Ability to maintain a clear airway will improve  4/3/2022 2115 by Yaya Tuttle RN  Outcome: Ongoing   Patient maintains a productive cough. All VSS. Will continue to monitor and assess. Problem: Safety:  Goal: Ability to chew and swallow food without choking will improve  Description: Ability to chew and swallow food without choking will improve  4/3/2022 2115 by Yaya Tuttle RN  Outcome: Ongoing   Patient on minced diet. Tolerates well, will continue to monitor and assess. Problem: Skin Integrity:  Goal: Will show no infection signs and symptoms  Description: Will show no infection signs and symptoms  4/3/2022 2115 by Yaya Tuttle RN  Outcome: Ongoing   Patient educated on q2 turns as per protocol. Will continue to monitor and assess. Problem: Pain:  Goal: Pain level will decrease  Description: Pain level will decrease  4/3/2022 2115 by Yaya Tuttle RN  Outcome: Ongoing   Patient had no complaints of pain this shift. Will continue to monitor and assess.

## 2022-04-04 NOTE — DISCHARGE INSTR - COC
Continuity of Care Form    Patient Name: Inga Malin   :    MRN:  [id number]    Admit date:  3/29/2022  Discharge date:  ***    Code Status Order: Full Code   Advance Directives:      Admitting Physician:  Amanda Rivera DO  PCP: Nader Ramey MD    Discharging Nurse: [facility] Unit/Room#: 4877/0366-04  Discharging Unit Phone Number: ***    Emergency Contact:   Extended Emergency Contact Information  Primary Emergency Contact: [place] [address] Phone: [phone]  Relation: Brother/Sister  Secondary Emergency Contact: [facility] [facility] Phone: [phone]  Work Phone: [phone]  Relation: Brother/Sister    Past Surgical History:  Past Surgical History:   Procedure Laterality Date    INGUINAL HERNIA REPAIR Bilateral 2019    LAPAROSCOPIC BILATERAL INGUINAL HERNIA REPAIR WITH MESH performed by Abel Collet, MD at [address] [place] [address]        Immunization History:   Immunization History   Administered Date(s) Administered    COVID-19, Pfizer Purple top, DILUTE for use, 12+ yrs, 30mcg/0.3mL dose 2021, 2021    Pneumococcal Polysaccharide ([id number]) 2021    Tdap (Boostrix, Adacel) 2021       Active Problems:  Patient Active Problem List   Diagnosis Code    Non-recurrent bilateral inguinal hernia without obstruction or gangrene K40.20    AQUILINO (acute kidney injury) ([facility] N17.9    Hypovolemia E86.1    Increased anion gap metabolic acidosis I42.8    Thrombocytopenia (HCC) D69.6    Prostate cancer ([facility] C61       Isolation/Infection:   Isolation            No Isolation          Patient Infection Status       None to display            Nurse Assessment:  Last Vital Signs: /66   Pulse 83   Temp 97.5 °F (36.4 °C) (Oral)   Resp 18   Ht 5' 6\" (1.676 m)   Wt 158 lb 8.2 oz (71.9 kg)   SpO2 99%   BMI 25.58 kg/m²     Last documented pain score (0-10 scale): Pain Level: 0  Last Weight:   Wt Readings from Last 1 Encounters:   22 158 lb 8.2 oz (71.9 kg)     Mental Status:  {IP PT MENTAL STATUS:}    IV Access:  { TAMARA IV ACCESS:179567879}    Nursing Mobility/ADLs:  Walking   {CHP DME GMOD:373357173}  Transfer  {CHP DME VFRO:557850125}  Bathing  {CHP DME TQZE:755016040}  Dressing  {CHP DME UDYX:631420219}  Toileting  {CHP DME XIOC:667683465}  Feeding  {CHP DME RKDM:738679077}  Med Admin  {CHP DME ZVGR:177490815}  Med Delivery   { TAMARA MED Delivery:893454371}    Wound Care Documentation and Therapy:        Elimination:  Continence: Bowel: {YES / YH:20186}  Bladder: {YES / AU:25416}  Urinary Catheter: {Urinary Catheter:181450213}   Colostomy/Ileostomy/Ileal Conduit: {YES / HQ:17102}       Date of Last BM: ***    Intake/Output Summary (Last 24 hours) at 2022 0820  Last data filed at 2022 0644  Gross per 24 hour   Intake 1360 ml   Output 2100 ml   Net -740 ml     I/O last 3 completed shifts: In: 4822 [P.O.:1440; I.V.:400]  Out: 3950 [Urine:3950]    Safety Concerns:     [employer] Safety Concerns:903857963}    Impairments/Disabilities:      [employer] [facility] Impairments/Disabilities:653943065}    Nutrition Therapy:  Current Nutrition Therapy: ADULT DIET;  Dysphagia - Minced and Moist  ADULT ORAL NUTRITION SUPPLEMENT; Breakfast, Lunch; Standard High Calorie/High Protein Oral Supplement  - Oral Nutrition Supplement:  Standard  twice a day    Routes of Feeding: Oral  Liquids: {Slp liquid thickness:00442}  Daily Fluid Restriction: {CHP DME Yes amt example:471328343}  Last Modified Barium Swallow with Video (Video Swallowing Test): {Done Not Done MMLU:706340530}    Treatments at the Time of Hospital Discharge:   Respiratory Treatments: ***  Oxygen Therapy:  {Therapy; copd oxygen:21711}  Ventilator:    { CC Vent CGYH:426079504}    Rehab Therapies: Physical Therapy and Occupational Therapy  Weight Bearing Status/Restrictions: [address] [employer] Weight Bearin}  Other Medical Equipment (for information only, NOT a DME order):  {EQUIPMENT:574490986}  Other Treatments: ***    Patient's personal belongings (please select all that are sent with patient):  {CHP DME Belongings:776743149}    RN SIGNATURE:  {Esignature:826364209}    CASE MANAGEMENT/SOCIAL WORK SECTION    Inpatient Status Date: ***    Readmission Risk Assessment Score:  Readmission Risk              Risk of Unplanned Readmission:  23           Discharging to Facility/ Agency   Name:   Address:  Phone:  Fax:    Dialysis Facility (if applicable)   Name:  Address:  Dialysis Schedule:  Phone:  Fax:    / signature: {Esignature:522951354}    PHYSICIAN SECTION    Prognosis: Fair    Condition at Discharge: Stable    Rehab Potential (if transferring to Rehab): Good    Recommended Labs or Other Treatments After Discharge: PT/OT, PCP visit, specialty follow up with nephrology, oncology. Bone marrow biopsy follow-up. Nutrition supplementation with Ensure. Physician Certification: I certify the above information and transfer of Astrid Hurt  is necessary for the continuing treatment of the diagnosis listed and that he requires {Admit to Appropriate Level of Care:13042} for {GREATER/LESS:040732005} 30 days.      Update Admission H&P: No change in H&P    PHYSICIAN SIGNATURE:  Electronically signed by Lulu Stuart MD, under the supervision of Dr. Cherri Jimenez MD on 4/4/22 at 9:01 AM EDT

## 2022-04-04 NOTE — PROGRESS NOTES
Physical Therapy    Daily Treatment Note       Discharge Recommendations:  Jada Garcia scored a 18/24 on the AM-PAC short mobility form. Current research shows that an AM-PAC score of 18 or greater is typically associated with a discharge to the patient's home setting. Based on the patient's AM-PAC score and their current functional mobility deficits, it is recommended that the patient have 2-3 sessions per week of Physical Therapy at d/c to increase the patient's independence. At this time, this patient demonstrates the endurance and safety to discharge home with Home PT. Please see assessment section for further patient specific details. Assessment:  Pt with improved mobility, gait and balance. Pt with increased gait distances, but needing seated rest breaks for fatigue throughout session. DC plan is now for pt to d/c home with brother. Brother's homme is a 1 floor plan. Pt will need a rolling walker. Pt would benefit from home PT to progress mobility back to baseline. Will follow. Equipment Needs:  Rolling walker    Chart Reviewed: Yes     Other position/activity restrictions: up with assist   Additional Pertinent Hx: Pt has prostate cancer with mets to pelvis, undergoing radiation therapy. He came to ED with c/o hypotension, diarrhea, fatigue-dx of hypovalemia, AQUILINO, thrombocytopenia-CXR=neg, abd/pelvis CT=mod L hydronephrosis with obstructing calculus, atelectasis and/or pneumonia, abd US=gallbladder polyps, renal US=L hydronephrosis, MBS=no laryngeal penetration or aspiration; PMHx: prostate CA, asthma, hernia repair, urinary retention      Diagnosis: hypovolemia   Treatment Diagnosis: impaired gait and transfers      Subjective:  Pt found sitting up in the chair. \"I can't go to the rest home because of my radiation schedule. My brother will take me. I can stay with him. \"  Pt now plans to d/c to brother's house. Brother lives in a 1 floor plan home with 1 step to enter.   \"I am not as sore

## 2022-04-04 NOTE — PROGRESS NOTES
FOLLOW UP NOTE    Diagnosis:    Past Medical History:   Diagnosis Date    Asthma     Environmental allergies     Inguinal hernia     Prostate cancer ([facility] [address]       Prostate cancer with bone metastasis      History:  Pearl Farmer is  66 y.o., he remains hospitalized for acute kidney injury, hypovolemia, generalized weakness and prostate cancer. Patient has improved significantly since last week. His platelet count has now normalized. He remains anemic and has a bone marrow biopsy which is pending. BUN and creatinine have also decreased but have not returned to normal.  Previous CT scan revealed left ureteral stone. He has a repeat CT scan ordered for today. Patient stating overall he feels much better than he did last week. Leblanc catheter still in place but is getting a voiding trial today. He is also on Flomax. Patient denies other complaints. Past Medical History, Surgical History, Social History, Family History and Medications are reviewed in detail and updated in his chart. ROS:    Constitutional:  No weight loss, No fever, No chills, No night sweats. Moderate fatigue. ENT / Mouth:  No dysphagia, No hoarseness, No oral ulcers. No sore throat, No tinnitus, Normal hearing. Cardiovascular:  No chest pain, No palpitations, No syncope, No upper extremity or lower extremity edema, No calf discomfort. Respiratory:  No cough. No hemoptysis, No wheezing, No dyspnea. Gastrointestinal:  No abdominal pain, No nausea, No vomiting, No constipation, No diarrhea, No melena, No dyspepsia. Urinary: Leblanc catheter in place    Musculoskeletal:  No muscle pain, No bone pain. Generalized weakness. Ambulating with a walker. Skin:  No rash, No nodules, No pruritus, No lesions. Neurologic:  No confusion, No seizures, No syncope, No tremor, No speech change, No headache, No abnormal gait, No sensory changes, No focal weakness.     Psychiatric:  No depression, No anxiety, Concentration normal.    Endocrine:  No hot flashes, No thyroid symptoms. Hematologic:  No epistaxis, No petechiae, No ecchymosis. Lymphatic:  No lymphadenopathy, No lymphedema. Physical Exam:  /66   Pulse 83   Temp 97.5 °F (36.4 °C) (Oral)   Resp 18   Ht 5' 6\" (1.676 m)   Wt 158 lb 8.2 oz (71.9 kg)   SpO2 99%   BMI 25.58 kg/m²     GENERAL: No acute distress. Alert & oriented x3. NECK: No thyromegaly or masses. LYMPHATICS:  No cervical, supraclavicular, infraclavicular or axillary lymphadenopathy. LUNGS:  Clear to auscultation bilaterally. No rales or wheezes. Good respiratory effort. HEART:  Regular rate and rhythm. ABDOMEN: Soft, ND, NT. No masses or hepatosplenomegaly. MUSCULOSKELETAL:  No tenderness to palpation of the spine or pelvis. Muscle strength 5/5 bilateral upper extremities, 4/5 bilateral lower extremities. EXTREMETIES:  No edema. Radiology:    EXAM: CT ABDOMEN PELVIS WO CONTRAST       INDICATION: hydronephrosis? AQUILINO       COMPARISON: January 11, 2022       TECHNIQUE: Standard per department protocol without contrast. Up-to-date CT dose lowering techniques were utilized.       FINDINGS:       Trace left pleural effusion with mild left lower lobe airspace consolidation. Small pericardial effusion.       The liver, gallbladder, pancreas, spleen, and adrenal glands are normal. 3 mm calculus at the left UVJ with moderate left hydronephrosis and hydroureter. There is left-sided perinephric stranding. No suspicious renal mass. No additional renal calculi. The bladder is normal. The prostate is mildly enlarged.       The small and large bowel are normal in caliber without focal bowel wall thickening. Mild colonic diverticulosis is noted. The appendix is not definitely identified. Small hiatal hernia. No abnormality of the abdominal wall. No intra-abdominal fluid    collections.       Mild vascular calcifications. No aneurysm. No lymphadenopathy.  No suspicious osseous lesions.           Impression       1.  Moderate left hydronephrosis with an obstructing 3 mm calculus at the left UVJ. 2.  Trace left pleural effusion with associated mild airspace consolidation which could represent atelectasis and/or pneumonia. Assessment and Plan:  Angie Mcgill is a 66 y.o. patient with above history of Metastatic prostate cancer admitted with acute kidney injury, hypovolemia, anemia, thrombocytopenia and weakness. Platelet count has normalized. BUN and creatinine continue to improve. Continue Flomax. The patient is stable enough to resume radiation therapy. Per patient's nurse, patient will be discharged today. We will arrange for patient to resume radiation therapy tomorrow as an outpatient. Please note this document has been produced using speech recognition software and may contain errors related to that system including errors in grammar, punctuation, and spelling, as well as words and phrases that may be inappropriate. If there are any questions or concerns please feel free to contact the dictating provider for clarification. A total of 30 minutes was spent on today's patient encounter. If applicable, non-patient-facing activities:     ( x  )   Preparing to see the patient and reviewing records     (   )   Individual interpretation of results      (   )   Discussion or coordination of care with other health care professionals     (   )   Ordering of unique tests, medications, or procedures     ( x  )   Documentation within the EHR         Elpidio Mcallister was consulted for this visit.

## 2022-04-04 NOTE — PROGRESS NOTES
Urology Attending Progress Note      Subjective: No new complaints     Vitals:  /66   Pulse 83   Temp 97.5 °F (36.4 °C) (Oral)   Resp 18   Ht 5' 6\" (1.676 m)   Wt 158 lb 8.2 oz (71.9 kg)   SpO2 99%   BMI 25.58 kg/m²   Temp  Av.3 °F (36.8 °C)  Min: 97.5 °F (36.4 °C)  Max: 99.1 °F (37.3 °C)    Intake/Output Summary (Last 24 hours) at 2022 0847  Last data filed at 2022 6640  Gross per 24 hour   Intake 1360 ml   Output 2100 ml   Net -740 ml       Exam: Coates draining clear    Labs:  WBC:    Lab Results   Component Value Date    WBC 10.6 2022     Hemoglobin/Hematocrit:    Lab Results   Component Value Date    HGB 7.6 2022    HCT 22.3 2022     BMP:    Lab Results   Component Value Date     2022    K 3.8 2022    K 3.6 2022    CL 95 2022    CO2 29 2022    BUN 52 2022    LABALBU 2.1 2022    CREATININE 1.6 2022    CALCIUM 8.0 2022    GFRAA 52 2022    LABGLOM 43 2022     PT/INR:    Lab Results   Component Value Date    PROTIME 11.8 2022    INR 1.04 2022     PTT:    Lab Results   Component Value Date    APTT 30.7 2022   [APTT    Urine Culture: Not sent    Blood Culture: Negative    Antibiotic Therapy:  Zosyn, Linezolid     Impression/Plan: 65 yo M with aggressive PCA sp ADT/XRT admitted with AQUILINO and hyponatremia.      -No  complaints, coates draining clear  -Cr improving  -Continue flomax   -Will check a CT abd/pelvis prior to his discharge today to check for stone passage  -Coates can be removed for voiding trial. Bladder scan after first void.  If PVR>500cc, call urology  -If above is normal, ok to DC from our standpoint    CLIF Rebolledo

## 2022-04-04 NOTE — PROGRESS NOTES
Occupational Therapy  Facility/Department: [address] [facility] [place]  Daily Treatment Note  NAME: Inga Malin  :   MRN: [id number]    Date of Service: 2022    Discharge Recommendations:    Inga Malin scored a 18/24 on the AM-PAC ADL Inpatient form. Current research shows that an AM-PAC score of 17 or less is typically not associated with a discharge to the patient's home setting. Based on the patient's AM-PAC score and their current ADL deficits, it is recommended that the patient have 3-5 sessions per week of Occupational Therapy at d/c to increase the patient's independence. Please see assessment section for further patient specific details. If patient discharges prior to next session this note will serve as a discharge summary. Please see below for the latest assessment towards goals. Assessment   Performance deficits / Impairments: Decreased functional mobility ; Decreased ADL status; Decreased endurance  Assessment: pt continues to require assist for LB ADLs and functional mobility this date, however less assist than previous session. Pt demos increased overall endurance tolerating functional mobility <> bathroom and within room. Pt plans to DC to SNF for continued IP OT services for inceased endurance and strength needed for ADLs. Cont OT POC. Treatment Diagnosis: impaired ADLs and mobility  Prognosis: Good  OT Education: OT Role;Transfer Training;Plan of Care;ADL Adaptive Strategies  Patient Education: needs reinforcement  Barriers to Learning: cognition  REQUIRES OT FOLLOW UP: Yes  Activity Tolerance  Activity Tolerance: Patient Tolerated treatment well  Activity Tolerance: increased activity tolerance this date  Safety Devices  Safety Devices in place: Yes  Type of devices: Left in chair;Nurse notified;Call light within reach; Chair alarm in place         Patient Diagnosis(es): The primary encounter diagnosis was Hypovolemia.  Diagnoses of AQUILINO (acute kidney injury) ([facility] [address] Thrombocytopenia ([facility] [address] and Increased anion gap metabolic acidosis were also pertinent to this visit. has a past medical history of Asthma, Environmental allergies, Inguinal hernia, and Prostate cancer ([address] has a past surgical history that includes lipoma resection and Inguinal hernia repair (Bilateral, 6/20/2019). Restrictions  Position Activity Restriction  Other position/activity restrictions: up with assist  Subjective   General  Chart Reviewed: Yes  Additional Pertinent Hx: PMH:  asthma, prostate cancer, hernia repair, urinary retention  Family / Caregiver Present: No  Referring Practitioner: Dr. Dilcia Rios  Diagnosis: Pt has prostate cancer with mets to pelvis, undergoing radiation therapy. He came to ED with c/o hypotension, diarrhea, fatigue-dx of hypovalemia, AQUILINO, thrombocytopenia-CXR=neg, abd/pelvis CT=mod L hydronephrosis with obstructing calculus, atelectasis and/or pneumonia, abd US=gallbladder polyps, renal US=L hydronephrosis, MBS=no laryngeal penetration or aspiration  Subjective  Subjective: pt in bathroom with nursing at time of arrival, pt agreeable to OT takeover. Vital Signs  Patient Currently in Pain: Denies   Orientation  Orientation  Orientation Level: Oriented to person;Oriented to place (not formally assessed, oriented to conversation however with difficulties with timeline of recent medical history)  Objective    ADL  Grooming: Supervision;Contact guard assistance (washed hands with SPV, CGA for balance)  LE Dressing: Moderate assistance (Pt participated in all parts to don hospital pants. Assist to thread catheter bag and initiall threading around toes.  Pt able to pull foot through leg and pull up pants in standing with CGA for balance.)  Toileting: Minimal assistance (assist for rear pericare and CGA for balance.)        Balance  Sitting Balance: Supervision  Standing Balance: Contact guard assistance  Standing Balance  Time: ~4 + 2 +2 min  Activity: functional mobility/ADLs and two sets of standing exercises  Comment: pt required SBA-CGA for static standing and close CGA for balance while completing standing UE exercises. Functional Mobility  Functional - Mobility Device: Standard Walker  Activity: To/from bathroom; Other (2 laps within room)  Assist Level: Contact guard assistance  Functional Mobility Comments: CGA and RW to ambulate <> bathroom and 2 laps in room with seated rest break  Toilet Transfers  Toilet - Technique: Ambulating  Equipment Used: Standard toilet (grab bar)  Toilet Transfer: Contact guard assistance  Toilet Transfers Comments: Effortful, VC for hand placement on grab bar vs. RW.     Transfers  Sit to stand: Contact guard assistance  Stand to sit: Contact guard assistance  Transfer Comments: from EOB bed<>recliner. Slow and effortful, CGA and RW. VCs for hand placement. Cognition  Overall Cognitive Status: Exceptions  Following Commands: Follows one step commands with increased time; Follows one step commands with repetition  Attention Span: Attends with cues to redirect  Memory: Decreased recall of recent events  Insights: Decreased awareness of deficits  Initiation: Does not require cues  Sequencing: Requires cues for some  Cognition Comment: Slow processing, distractable, tangential requiring VC to stay on topic. Type of ROM/Therapeutic Exercise  Type of ROM/Therapeutic Exercise: AROM  Comment: pt completed 2 x5 reps of BUE shoulder presses and lateral raises for increased UE endurance and dynamic standing balance. VCs for slow pace, no LOB.                     Plan   Plan  Times per week: 2-5  Current Treatment Recommendations: Balance Training,Functional Mobility Training,Endurance Training,Safety Education & Training,Self-Care / ADL                                                    AM-PAC Score        -[facility] Inpatient Daily Activity Raw Score: 18 (04/04/22 1116)  AM-PAC Inpatient ADL T-Scale Score : 38.66 (04/04/22 1116)  ADL Inpatient CMS 0-100% Score: 46.65 (04/04/22 1116)  ADL Inpatient CMS G-Code Modifier : CK (04/04/22 1116)    Goals  Short term goals  Time Frame for Short term goals: discharge  Short term goal 1: pt to transfer to commode with CGA - Met 4/1; Transfer to/from chairs and commode with spvn - Not met  Short term goal 2: pt to don hospital pants vs brief with mod A - 4/4 met, keep for consistency and education for catheter  Short term goal 3: pt to stand for 3-6 minutes with CGA/SBA while doing functional tasks - Not met  Short term goal 4: pt will tolerate functional mobility to/from bathroom for toileting or grooming - 4/4 met   New goal: Pt will increase functional mobility endurance to 5 minutes w/o rest with SBA-not met  Patient Goals   Patient goals : not stated       Therapy Time   Individual Concurrent Group Co-treatment   Time In 0915         Time Out 0945         Minutes 30                 Irina Chen OT

## 2022-04-04 NOTE — TELEPHONE ENCOUNTER
Need clarification on levofloxacin - written for 7 tabs but directions say 1 daily for 5 days. Should quantity be 5 or 7?     Celena Atkinson 26 [phone]

## 2022-04-04 NOTE — PROGRESS NOTES
Nephrology  Note                                                                                                                                                                                                                                                                                                                                                               Office : [phone]     Fax :[phone]              Patient's Name: Peña Montalvo  12:55 PM  4/4/2022    Reason for Consult: AQUILINO  Requesting Physician:  Khurram Herrera MD      Chief Complaint:  Dulce Maria Ora        04/04/22     Pt has good UO   Cr better   No diarrhea       Past Medical History:   Diagnosis Date    Asthma     Environmental allergies     Inguinal hernia     Prostate cancer [facility])        Past Surgical History:   Procedure Laterality Date    INGUINAL HERNIA REPAIR Bilateral 6/20/2019    LAPAROSCOPIC BILATERAL INGUINAL HERNIA REPAIR WITH MESH performed by Mac Santo MD at [address] [place] [address]      back        History reviewed. No pertinent family history. reports that he has never smoked. He has never used smokeless tobacco. He reports current alcohol use. He reports that he does not use drugs. Allergies:   Other    Current Medications:    heparin (porcine) injection 5,000 Units, 3 times per day  aluminum & magnesium hydroxide-simethicone (MAALOX) 200-200-20 MG/5ML suspension 30 mL, Q6H PRN  piperacillin-tazobactam (ZOSYN) 3,375 mg in dextrose 5 % 50 mL IVPB (mini-bag), Q8H  0.9 % sodium chloride infusion, PRN  polyethylene glycol (GLYCOLAX) packet 17 g, Daily  senna (SENOKOT) tablet 8.6 mg, Nightly  tamsulosin (FLOMAX) capsule 0.4 mg, Daily  sodium chloride flush 0.9 % injection 5-40 mL, 2 times per day  sodium chloride flush 0.9 % injection 5-40 mL, PRN  0.9 % sodium chloride infusion, PRN  ondansetron (ZOFRAN-ODT) disintegrating tablet 4 mg, Q8H PRN   Or  ondansetron (ZOFRAN) injection 4 mg, Q6H PRN  acetaminophen (TYLENOL) tablet 650 mg, Q6H PRN   Or  acetaminophen (TYLENOL) suppository 650 mg, Q6H PRN  lidocaine 4 % external patch 1 patch, Daily  0.9 % sodium chloride infusion, PRN        Review of Systems:   14 point ROS obtained but were negative except mentioned in HPI      Physical exam:     Vitals:  /66   Pulse 83   Temp 97.5 °F (36.4 °C) (Oral)   Resp 18   Ht 5' 6\" (1.676 m)   Wt 158 lb 8.2 oz (71.9 kg)   SpO2 99%   BMI 25.58 kg/m²   Constitutional:  OAA X3 NAD  Skin: no rash, turgor wnl  Heent:  eomi, mmm  Neck: no bruits or jvd noted  Cardiovascular:  S1, S2 without m/r/g  Respiratory: CTA B without w/r/r  Abdomen:  +bs, soft, nt, nd  Ext: No lower extremity edema  Psychiatric: mood and affect appropriate  Musculoskeletal:  Rom, muscular strength intact    Data:   Labs:  CBC:   Recent Labs     04/02/22 0429 04/03/22 0412 04/04/22 0349   WBC 8.0 9.3 10.6   HGB 8.2* 7.9* 7.6*   PLT 75* 125* 186     BMP:    Recent Labs     04/02/22 0429 04/03/22 0412 04/04/22 0349   * 133* 130*   K 3.7 3.8 3.8   CL 96* 95* 95*   CO2 26 28 29   BUN 86* 66* 52*   CREATININE 2.0* 1.9* 1.6*   GLUCOSE 121* 114* 116*     Ca/Mg/Phos:   Recent Labs     04/02/22 0429 04/03/22 0412 04/04/22 0349   CALCIUM 8.0* 8.0* 8.0*   MG 2.30 2.20 2.00     Hepatic:   Recent Labs     04/02/22 0429   AST 76*   ALT 69*   BILITOT 1.7*   ALKPHOS 388*     Troponin:   No results for input(s): TROPONINI in the last 72 hours. BNP: No results for input(s): BNP in the last 72 hours. Lipids: No results for input(s): CHOL, TRIG, HDL, LDLCALC, LABVLDL in the last 72 hours. ABGs: No results for input(s): PHART, PO2ART, THV1FCE in the last 72 hours. INR:   No results for input(s): INR in the last 72 hours. UA:  No results for input(s): Alleen Corti, GLUCOSEU, BILIRUBINUR, KETUA, SPECGRAV, BLOODU, PHUR, PROTEINU, UROBILINOGEN, NITRU, LEUKOCYTESUR, Theone Ebbs in the last 72 hours.    Urine Microscopic:   No results for input(s): LABCAST, BACTERIA, COMU, HYALCAST, WBCUA, RBCUA, EPIU in the last 72 hours. Urine Culture: No results for input(s): LABURIN in the last 72 hours. Urine Chemistry:   No results for input(s): Kerney Quintana, PROTEINUR, NAUR in the last 72 hours. IMAGING:  CT ABDOMEN PELVIS WO CONTRAST Additional Contrast? None   Final Result      1. Interval passage of the left ureteral calculus into the bladder. 2.   Moderate left hydronephrosis, slightly decreased. 3.  Persistent left pleural effusion with overlying airspace disease. 4.  Unchanged indeterminate small sclerotic foci in the pelvis metastatic disease included in the differential.            XR ABDOMEN (2 VIEWS)   Final Result      1. Nonobstructive bowel gas pattern with oral contrast from previous administration in the colon and rectum   2. Left basilar airspace disease, possible pleural effusion      XR CHEST PORTABLE   Final Result      Left basilar atelectasis or pneumonia. US RENAL COMPLETE   Final Result      1. Mild to moderate left hydronephrosis, not significantly changed. FL MODIFIED BARIUM SWALLOW W VIDEO   Final Result      1. No laryngeal penetration or aspiration. US ABDOMEN LIMITED   Final Result   IMPRESSION :      Gallbladder polyps, the largest of which measures 6 x 5 mm. One year follow up ultrasound recommended. Left hydronephrosis, as noted on the prior study. Mildly heterogeneous increased hepatic echogenicity. Findings may reflect mild steatosis. US RENAL COMPLETE   Final Result   IMPRESSION :      Gallbladder polyps, the largest of which measures 6 x 5 mm. One year follow up ultrasound recommended. Left hydronephrosis, as noted on the prior study. Mildly heterogeneous increased hepatic echogenicity. Findings may reflect mild steatosis. CT ABDOMEN PELVIS WO CONTRAST Additional Contrast? None   Final Result      1.   Moderate left hydronephrosis with an obstructing 3 mm calculus at the left UVJ. 2.  Trace left pleural effusion with associated mild airspace consolidation which could represent atelectasis and/or pneumonia. POC US [facility]   Final Result      XR CHEST PORTABLE   Final Result   1. No interval changes. Assessment/Plan   AQUILINO  UTI  Cr on admission was 4.9. Baseline was 0.9. Patient endorsing left flank pain. Recently having poor PO intake. 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     -Cr  - improving   -Urology consulted- Leblanc placed   - SANTI - obstruction   -Continue abx       Anemia  Hx of prostate cancer with mets. Started radiation therapy on 3/21.  S/p 1 round of hormonal therapy     Acid- base/ Electrolyte imbalance   Hyponatremia   Hypokalemia   - monitor           Recommend to dose adjust all medications  based on renal functions  Maintain SBP> 90 mmHg   Daily weights   AVOID NSAIDs  Avoid Nephrotoxins  Monitor Intake/Output  Call if significant decrease in urine output     Dung Booker MD

## 2022-04-05 ENCOUNTER — CARE COORDINATION (OUTPATIENT)
Dept: CASE MANAGEMENT | Age: 72
End: 2022-04-05

## 2022-04-05 DIAGNOSIS — E86.1 HYPOVOLEMIA: Primary | ICD-10-CM

## 2022-04-05 PROCEDURE — 1111F DSCHRG MED/CURRENT MED MERGE: CPT | Performed by: STUDENT IN AN ORGANIZED HEALTH CARE EDUCATION/TRAINING PROGRAM

## 2022-04-05 NOTE — CARE COORDINATION
Transitions of Care Initial Call    Was this an external facility discharge? No     Challenges to be reviewed by the provider   Additional needs identified to be addressed with provider: No  none             Method of communication with provider : face to face      Advance Care Planning:   Does patient have an Advance Directive: not on file. Was this a readmission? No  Patients top risk factors for readmission: medical condition-Prostate CA, Hypovolemia. Care Transition Nurse (CTN) contacted the family by telephone to perform post hospital discharge assessment. Verified name and  with family as identifiers. Provided introduction to self, and explanation of the CTN role. CTN reviewed discharge instructions, medical action plan and red flags with family who verbalized understanding. Family given an opportunity to ask questions and does not have any further questions or concerns at this time. Were discharge instructions available to patient? Yes. Reviewed appropriate site of care based on symptoms and resources available to patient including: PCP  Specialist  Urgent care clinics  Home health  When to call 911. The family agrees to contact the PCP office for questions related to their healthcare. Medication reconciliation was performed with family, who verbalizes understanding of administration of home medications. Advised obtaining a 90-day supply of all daily and as-needed medications. Covid Risk Education     Educated patient about risk for severe COVID-19 due to risk factors according to CDC guidelines. CTN reviewed discharge instructions, medical action plan and red flag symptoms with the family who verbalized understanding. Discussed COVID vaccination status: Yes. Education provided on COVID-19 vaccination as appropriate. Discussed exposure protocols and quarantine with CDC Guidelines.  Family was given an opportunity to verbalize any questions and concerns and agrees to contact CTN or [facility] care provider for questions related to their healthcare. Reviewed and educated family on any new and changed medications related to discharge diagnosis. Was patient discharged with a pulse oximeter? No Discussed and confirmed pulse oximeter discharge instructions and when to notify provider or seek emergency care. CTN spoke with patient and brother Tasneem Grimaldo this am for initial 24 hour discharge follow up CTN call. Patient and Brother state patient is doing well, no reports of any fever, chills, nausea, vomiting, chest pain, SOB or cough. Patient with no congestion, pain, difficulty emptying bladder, LE edema, feeling lightheaded, dizziness, and heart palpitations. CTN encouraged brother and patient to continue to monitor for any of the above s/s, also instructed to monitor weakness, reporting any increase in weakness, as well as any of the other above listed s/s, to MD immediately. Brothmarie in the process this am of getting all follow up appointments scheduled, with [facility], Nephrologist and GI. CTN and patients family member reviewed meds and CTN completed the 1111f. Brother in route to pharmacy this am to  scripts. CTN did contact [facility] intake department, [facility]. Brother states SN to be in home today between 1-3 PM.  CTN provided education on s/s that require medical attention and when to seek medical attention. CTN advised Pt of use urgent care or physicians 24 hr access line if assistance is needed after hours or on the weekend. Pt denies any needs or concerns and is agreeable with additional calls. Patient instructed to continue to monitor for any of the above s/s, reporting any that may present to MD immediately. CTN provided contact information. Plan for follow-up call in 5-7 days based on severity of symptoms and risk factors. Plan for next call: symptom management-Weakness, side effects to Radiaction.       Thank Roger Nevarez RN  Alan Transition Coordinator  Contact Number:[phone]

## 2022-04-11 DIAGNOSIS — N17.9 AKI (ACUTE KIDNEY INJURY) (HCC): ICD-10-CM

## 2022-04-11 LAB
ALBUMIN SERPL-MCNC: 2.9 G/DL (ref 3.4–5)
ANION GAP SERPL CALCULATED.3IONS-SCNC: 14 MMOL/L (ref 3–16)
BUN BLDV-MCNC: 21 MG/DL (ref 7–20)
CALCIUM SERPL-MCNC: 8.7 MG/DL (ref 8.3–10.6)
CHLORIDE BLD-SCNC: 100 MMOL/L (ref 99–110)
CO2: 21 MMOL/L (ref 21–32)
CREAT SERPL-MCNC: 1.4 MG/DL (ref 0.8–1.3)
GFR AFRICAN AMERICAN: >60
GFR NON-AFRICAN AMERICAN: 50
GLUCOSE BLD-MCNC: 95 MG/DL (ref 70–99)
PHOSPHORUS: 4.5 MG/DL (ref 2.5–4.9)
POTASSIUM SERPL-SCNC: 5 MMOL/L (ref 3.5–5.1)
SODIUM BLD-SCNC: 135 MMOL/L (ref 136–145)

## 2022-04-12 LAB
Lab: NORMAL
REPORT: NORMAL
THIS TEST SENT TO: NORMAL

## 2022-04-13 ENCOUNTER — CARE COORDINATION (OUTPATIENT)
Dept: CASE MANAGEMENT | Age: 72
End: 2022-04-13

## 2022-04-13 NOTE — CARE COORDINATION
Date/Time:  4/13/2022 12:09 PM  Attempted to reach patient by telephone. Call within 2 business days of discharge: Yes,  Left HIPPA compliant message requesting a return call. Will attempt to reach patient again.     Thank Clemente Tomlinson RN  Care Transition Coordinator  Contact [facility]:[phone]

## 2022-04-15 DIAGNOSIS — R35.0 BENIGN PROSTATIC HYPERPLASIA WITH URINARY FREQUENCY: ICD-10-CM

## 2022-04-15 DIAGNOSIS — N40.1 BENIGN PROSTATIC HYPERPLASIA WITH URINARY FREQUENCY: ICD-10-CM

## 2022-04-18 RX ORDER — TAMSULOSIN HYDROCHLORIDE 0.4 MG/1
CAPSULE ORAL
Qty: 30 CAPSULE | Refills: 5 | Status: SHIPPED | OUTPATIENT
Start: 2022-04-18 | End: 2022-10-17

## 2022-04-20 ENCOUNTER — OFFICE VISIT (OUTPATIENT)
Dept: INTERNAL MEDICINE CLINIC | Age: 72
End: 2022-04-20
Payer: MEDICARE

## 2022-04-20 ENCOUNTER — CARE COORDINATION (OUTPATIENT)
Dept: CASE MANAGEMENT | Age: 72
End: 2022-04-20

## 2022-04-20 VITALS
SYSTOLIC BLOOD PRESSURE: 113 MMHG | DIASTOLIC BLOOD PRESSURE: 68 MMHG | BODY MASS INDEX: 24.64 KG/M2 | HEART RATE: 75 BPM | HEIGHT: 66 IN | WEIGHT: 153.3 LBS | TEMPERATURE: 97.2 F | OXYGEN SATURATION: 91 %

## 2022-04-20 DIAGNOSIS — Z09 HOSPITAL DISCHARGE FOLLOW-UP: ICD-10-CM

## 2022-04-20 DIAGNOSIS — R94.30 LOW LEFT VENTRICULAR EJECTION FRACTION: ICD-10-CM

## 2022-04-20 DIAGNOSIS — R60.0 PEDAL EDEMA: Primary | ICD-10-CM

## 2022-04-20 PROBLEM — C79.82 METASTATIC ADENOCARCINOMA TO PROSTATE (HCC): Status: ACTIVE | Noted: 2022-01-03

## 2022-04-20 PROCEDURE — 99213 OFFICE O/P EST LOW 20 MIN: CPT | Performed by: STUDENT IN AN ORGANIZED HEALTH CARE EDUCATION/TRAINING PROGRAM

## 2022-04-20 ASSESSMENT — ENCOUNTER SYMPTOMS
DIARRHEA: 0
CHEST TIGHTNESS: 0
COUGH: 0
WHEEZING: 0
SHORTNESS OF BREATH: 0
CONSTIPATION: 0
ABDOMINAL PAIN: 0
RHINORRHEA: 0
VOMITING: 0
NAUSEA: 0

## 2022-04-20 NOTE — CARE COORDINATION
[facility] Transitions Initial Follow Up Call    Call within 2 business days of discharge: Yes    Patient:  Memory Randy   Patient :  1950  MRN:  [id number]     Reason for Admission: COVID -19 Monitoring   Discharge Date:  22    RARS:       Non-face-to-face services provided:    1ST CTC attempt to reach Pt regarding recent hospital discharge. CTC left voice recording with call back number requesting a call back.     Follow up appointments:    Future Appointments   Date Time Provider Andrew Porras   2022 11:30 AM SCHEDULE, [facility] PLAIN ECHO  [facility]   2022  8:15 AM Randall Crawford MD [facility]   2022 12:45 PM Amanda Parker MD [facility]       Thank You,    Jannet Arredondo RN  Care Transition Coordinator  Contact FLFRBEATRIS:[phone]

## 2022-04-20 NOTE — PROGRESS NOTES
Outpatient Clinic Established Patient Note    Patient: Natalie Camejo  :  (66 y.o.)  Date: 2022    CC: hospital follow up    HPI:      65 yo man with hx advanced, metastatic prostate cancer, Receiving XRT, recent admission for hypovolemia, urosepsis and obstructive AQUILINO discharged on  presents for hospital follow up    Pt doing well today. No acute complaints. Still getting PT/OT, reports that his strength is almost back to baseline. Saw Dr Kaci Jose on , stopped his lisinoprilHCTZ as he was hypotensive, started PO Lasix 20 for pedal edema. Cr down trended to 1.4( from 4.9), making good urine. Pt still endorsing pedal edema, worse at night. No SOB, orthopnea, CP, TANNER, PND, N/V, weight changes. Pt does take a lot of canned foods as he is supposed to take low microbial diet as per radiation tx. Home Meds:  Prior to Visit Medications    Medication Sig Taking? Authorizing Provider   FERROUS GLUCONATE PO Take by mouth every other day Yes Historical Provider, MD   tamsulosin (FLOMAX) 0.4 MG capsule TAKE 1 CAPSULE BY MOUTH EVERY DAY Yes Nyla Velazquez MD   furosemide (LASIX) 20 MG tablet Take 1 tablet by mouth daily Yes Amanda Parker MD   pantoprazole (PROTONIX) 40 MG tablet Take 1 tablet by mouth 2 times daily (before meals) Yes Melissa Sosa MD   acetaminophen (TYLENOL) 500 MG tablet Take 500 mg by mouth every 6 hours as needed for Pain Yes Historical Provider, MD       Allergies:     Other    Health Maintenance Due   Topic Date Due    Shingles Vaccine (1 of 2) Never done   [employer] Visit (AWV)  Never done       Immunization History   Administered Date(s) Administered    COVID-19, Moderna, Booster, PF, 50mcg/0.25ml 2021    COVID-19, Pfizer Purple top, DILUTE for use, 12+ yrs, 30mcg/0.3mL dose 2021, 2021    Pneumococcal Polysaccharide ([id number]) 2021    Tdap (Boostrix, Adacel) 2021       Review of Systems   Constitutional: Negative for activity change, appetite change, chills, fatigue, fever and unexpected weight change. HENT: Negative for congestion and rhinorrhea. Respiratory: Negative for cough, chest tightness, shortness of breath and wheezing. Cardiovascular: Positive for leg swelling. Negative for chest pain and palpitations. Gastrointestinal: Negative for abdominal pain, constipation, diarrhea, nausea and vomiting. Endocrine: Negative for polydipsia and polyuria. Genitourinary: Positive for frequency. Negative for difficulty urinating and dysuria. Neurological: Negative for dizziness, tremors, weakness, light-headedness and headaches. Psychiatric/Behavioral: Negative for confusion. A 10-organ Review Of Systems was obtained and otherwise unremarkable except as per HPI. Data: Old records have been reviewed electronically. PHYSICAL EXAM:  /68 (Site: Left Upper Arm, Position: Sitting, Cuff Size: Medium Adult)   Pulse 75   Temp 97.2 °F (36.2 °C) (Temporal)   Ht 5' 6\" (1.676 m)   Wt 153 lb 4.8 oz (69.5 kg)   SpO2 91%   BMI 24.74 kg/m²   Physical Exam  Constitutional:       General: He is not in acute distress. Appearance: Normal appearance. He is not ill-appearing, toxic-appearing or diaphoretic. Eyes:      Pupils: Pupils are equal, round, and reactive to light. Cardiovascular:      Rate and Rhythm: Normal rate and regular rhythm. Pulses: Normal pulses. Heart sounds: Murmur heard. Pulmonary:      Effort: Pulmonary effort is normal. No respiratory distress. Breath sounds: Normal breath sounds. Abdominal:      General: Bowel sounds are normal. There is no distension. Palpations: Abdomen is soft. Musculoskeletal:         General: Swelling present. Right lower leg: Edema present. Left lower leg: Edema present. Comments: 3+ edema b/l   Neurological:      Mental Status: He is alert and oriented to person, place, and time. Mental status is at baseline. Assessment & Plan:      1. Pedal edema  2. Low left ventricular ejection fraction  Echo in 11/2021 for murmur revealed EF of 50%. No Hx of MI. Unclear cause of this low normal EF. Never seen cardiology. Will reassess with Echo. If EF is worse will get cardiology involved. If EF better, will have to look for other explanations for pedal edema. Might have to increase lasix    Has next appt with Dr Vandana Null in June. Might have to prepone it if his pedal edema persists. - ECHO Complete 2D W Doppler W Color; Future    3. Hospital discharge follow-up  Pt doing great. Reports his strength is much better. Return in about 3 weeks (around 5/11/2022).     Dispo: Pt has been staffed with Dr. Karla Shaw   _______________  Ayana Melendez MD, 4/20/2022 9:44 AM   PGY-2

## 2022-04-20 NOTE — CARE COORDINATION
[facility] Transitions Follow Up Call    2022    Patient: Darrel Joel  Patient : 1950   MRN: [id number]   Reason for Admission: COVID -19 Monitoring   Discharge Date: 22 RARS: Readmission Risk Score: 19.6 ( )         Spoke with: Brother- 116Ismael Sauer Transitions Subsequent and Final Call    Schedule Follow Up Appointment with PCP: Declined  Subsequent and Final Calls  Do you have any ongoing symptoms?: No  Have your medications changed?: No  Do you have any questions related to your medications?: No  Do you currently have any active services?: No  Are you currently active with any services?: Home Health  Do you have any needs or concerns that I can assist you with?: No  Identified Barriers: None  Care Transitions Interventions  No Identified Needs  Other Interventions:         Summary  CTN spoke with patients brother Kenzie Wade this afternoon for follow up CTN call. Brother states patient is doing really well, reporting no complaints of any fever, chills, nausea, vomiting, chest pain, SOB or cough. Patient with no congestion, pain, difficulty emptying bladder, LE edema, feeling lightheaded, dizziness, and heart palpitations. Brother states patient has completed PT/OT with [address], all disciplines have signed off, services no longer needed. Kenzie Wade reports patient is still staying with sister and him, off and on, for assistance. CTN encouraged brother to continue to monitor patient for any of the above s/s, reporting any that may present to MD immediately. No other issues or concerns, no new or changed medications at this time. Patient had follow up with [facility] today. CTN provided education on s/s that require medical attention and when to seek medical attention. CTN advised Pt of use urgent care or physicians 24 hr access line if assistance is needed after hours or on the weekend. Pt denies any needs or concerns and is agreeable with additional calls.  Patient instructed to continue to monitor for any of the above s/s, reporting any that may present to MD immediately.       Follow Up  Future Appointments   Date Time Provider Andrew Porras   4/29/2022 11:30 AM SCHEDULE, [facility] PLA[facility] ECHO  [facility]   5/11/2022  8:15 AM Josy Nowak MD [facility]   6/14/2022 12:45 PM Riaz Carrillo MD [facility]       Claire Ayala RN

## 2022-04-26 ENCOUNTER — CARE COORDINATION (OUTPATIENT)
Dept: CASE MANAGEMENT | Age: 72
End: 2022-04-26

## 2022-04-26 NOTE — CARE COORDINATION
Date/Time:  4/26/2022 10:08 AM  Attempted to reach patient by telephone. Call within 2 business days of discharge: Yes,  Left HIPPA compliant message requesting a return call. CTN will close out COVID -19 Monitoring episode at this time.     Thank Serg Avila RN  Care Transition Coordinator  Contact MZQEPS:[phone]

## 2022-04-29 ENCOUNTER — HOSPITAL ENCOUNTER (OUTPATIENT)
Dept: NON INVASIVE DIAGNOSTICS | Age: 72
Discharge: HOME | End: 2022-04-29
Payer: MEDICARE

## 2022-04-29 DIAGNOSIS — R60.0 PEDAL EDEMA: ICD-10-CM

## 2022-04-29 DIAGNOSIS — R94.30 LOW LEFT VENTRICULAR EJECTION FRACTION: ICD-10-CM

## 2022-04-29 LAB
LV EF: 65 %
LVEF MODALITY: NORMAL

## 2022-04-29 PROCEDURE — 93306 TTE W/DOPPLER COMPLETE: CPT

## 2022-05-02 ENCOUNTER — TELEPHONE (OUTPATIENT)
Dept: INTERNAL MEDICINE CLINIC | Age: 72
End: 2022-05-02

## 2022-05-02 NOTE — TELEPHONE ENCOUNTER
----- Message from Beckie Becerra MD sent at 5/2/2022  8:47 AM EDT -----  Hello sir    The ultrasound of your heart looks good. Your heart is pumping at the expected level. I do not think your heart is causing swelling of your legs.  Please reach out to Dr Bella Vargas and let him know that you still have swelling in your legs

## 2022-05-03 ENCOUNTER — TELEPHONE (OUTPATIENT)
Dept: INTERNAL MEDICINE CLINIC | Age: 72
End: 2022-05-03

## 2022-05-03 NOTE — TELEPHONE ENCOUNTER
----- Message from Kelton Amaya MD sent at 5/2/2022  8:47 AM EDT -----  Hello sir    The ultrasound of your heart looks good. Your heart is pumping at the expected level. I do not think your heart is causing swelling of your legs.  Please reach out to Dr Brianna Hermosillo and let him know that you still have swelling in your legs

## 2022-05-11 ENCOUNTER — OFFICE VISIT (OUTPATIENT)
Dept: INTERNAL MEDICINE CLINIC | Age: 72
End: 2022-05-11
Payer: MEDICARE

## 2022-05-11 VITALS
DIASTOLIC BLOOD PRESSURE: 67 MMHG | HEART RATE: 60 BPM | SYSTOLIC BLOOD PRESSURE: 115 MMHG | HEIGHT: 66 IN | BODY MASS INDEX: 23.43 KG/M2 | OXYGEN SATURATION: 98 % | TEMPERATURE: 98.1 F | WEIGHT: 145.8 LBS

## 2022-05-11 DIAGNOSIS — N17.9 AKI (ACUTE KIDNEY INJURY) (HCC): ICD-10-CM

## 2022-05-11 DIAGNOSIS — H61.23 BILATERAL IMPACTED CERUMEN: Primary | ICD-10-CM

## 2022-05-11 DIAGNOSIS — R79.89 ABNORMAL LFTS: ICD-10-CM

## 2022-05-11 DIAGNOSIS — R60.0 PEDAL EDEMA: ICD-10-CM

## 2022-05-11 PROCEDURE — 99213 OFFICE O/P EST LOW 20 MIN: CPT | Performed by: STUDENT IN AN ORGANIZED HEALTH CARE EDUCATION/TRAINING PROGRAM

## 2022-05-11 ASSESSMENT — ENCOUNTER SYMPTOMS
DIARRHEA: 0
SHORTNESS OF BREATH: 0
WHEEZING: 0
ABDOMINAL PAIN: 0
NAUSEA: 0
COUGH: 0
VOMITING: 0
ABDOMINAL DISTENTION: 0

## 2022-05-11 NOTE — PROGRESS NOTES
2022    Perfecto Lynne (:  1950) is a 66 y.o. male, here for a preventive medicine evaluation. Patient is here for follow up for imaging and lab results. Patient complaining of decreased hearing in left ear and feeling of fullness. No ear pain, ringing, itching, discharge. Pedal edema is improving. Echo and labs reviewed. Patient will be following with GI. Denies any abdominal pain, n/v. Appetite has improved. Denies any other complaints. Patient Active Problem List   Diagnosis    Non-recurrent bilateral inguinal hernia without obstruction or gangrene    AQUILINO (acute kidney injury) ([address]    Hypovolemia    Increased anion gap metabolic acidosis    Thrombocytopenia ([address]    Metastatic adenocarcinoma to prostate ([address]    Pedal edema    Bilateral impacted cerumen    Abnormal LFTs       Review of Systems   Constitutional: Negative for chills, fatigue and fever. HENT: Positive for hearing loss. Negative for congestion, ear discharge and ear pain. Respiratory: Negative for cough, shortness of breath and wheezing. Cardiovascular: Negative for chest pain and palpitations. Gastrointestinal: Negative for abdominal distention, abdominal pain, diarrhea, nausea and vomiting. Genitourinary: Negative for dysuria. Neurological: Negative for dizziness, syncope, weakness, light-headedness and numbness. Prior to Visit Medications    Medication Sig Taking?  Authorizing Provider   FERROUS GLUCONATE PO Take by mouth every other day Yes Historical Provider, MD   tamsulosin (FLOMAX) 0.4 MG capsule TAKE 1 CAPSULE BY MOUTH EVERY DAY Yes Leo Guerra MD   furosemide (LASIX) 20 MG tablet Take 1 tablet by mouth daily  Patient taking differently: Take 20 mg by mouth daily 2 tablets daily Yes Amrik Pierce MD   acetaminophen (TYLENOL) 500 MG tablet Take 500 mg by mouth every 6 hours as needed for Pain Yes Historical Provider, MD   pantoprazole (PROTONIX) 40 MG tablet Take 1 tablet by mouth 2 DISCHARGE SUMMARY    NAME:Linda Mcdowell  : 1975  MRN: [id number]    The patient Chang Avalos exhibits the ability to control behavior in a less restrictive environment. Patient's level of functioning is improving. No assaultive/destructive behavior has been observed for the past 24 hours. No suicidal/homicidal threat or behavior has been observed for the past 24 hours. There is no evidence of serious medication side effects. Patient has not been in physical or protective restraints for at least the past 24 hours. If weapons involved, how are they secured? Patient denied owning or having access to a firearm    Is patient aware of and in agreement with discharge plan? Patient is aware and did not agree with the [facility] discharge based on not follow substance use treatment recommendations of inpatient treatment upon discharge    Arrangements for medication:  Prescriptions given to patient, given a weeks supply or 30 day supply. Copy of discharge instructions to provider?:  Will fax to d    Arrangements for transportation home:  Medicaid transportation coordinated by patient    Keep all follow up appointments as scheduled, continue to take prescribed medications per physician instructions.   Mental health crisis number:  712 or your local mental health crisis line number at [phone]      [facility] Emergency WARM LINE      1-046-047-MHAV ([phone])      M-F: 9am to 9pm      Sat & Sun: 5pm  9pm  National suicide prevention lines:                             7-655-AVPTWOA (9-289-036-040-230-7598)       4-176-233-TALK ([phone])    Crisis Text Line:  Text HOME to [phone] times daily (before meals)  Patient not taking: Reported on 5/11/2022  Melissa Sosa MD        Allergies   Allergen Reactions    Other      dust,,animal dander,pollen       Past Medical History:   Diagnosis Date    Asthma     Environmental allergies     Inguinal hernia     Prostate cancer [facility])        Past Surgical History:   Procedure Laterality Date    INGUINAL HERNIA REPAIR Bilateral 6/20/2019    LAPAROSCOPIC BILATERAL INGUINAL HERNIA REPAIR WITH MESH performed by Kylie Ennis MD at [facility]        Social History     Socioeconomic History    Marital status: Single     Spouse name: Not on file    Number of children: Not on file    Years of education: Not on file    Highest education level: Not on file   Occupational History    Not on file   Tobacco Use    Smoking status: Never Smoker    Smokeless tobacco: Never Used   Substance and Sexual Activity    Alcohol use: Yes     Comment: occ     Drug use: No    Sexual activity: Not on file   Other Topics Concern    Not on file   Social History Narrative    Not on file     Social Determinants of Health     Financial Resource Strain:     Difficulty of Paying Living Expenses: Not on file   Food Insecurity:     Worried About Running Out of Food in the Last Year: Not on file    Francisca of Food in the Last Year: Not on file   Transportation Needs:     Lack of Transportation (Medical): Not on file    Lack of Transportation (Non-Medical):  Not on file   Physical Activity:     Days of Exercise per Week: Not on file    Minutes of Exercise per Session: Not on file   Stress:     Feeling of Stress : Not on file   Social Connections:     Frequency of Communication with Friends and Family: Not on file    Frequency of Social Gatherings with Friends and Family: Not on file    Attends Synagogue Services: Not on file    Active Member of Clubs or Organizations: Not on file    Attends Club or Organization Meetings: Not on file    Marital Status: Not on file   Intimate Partner Violence:     Fear of Current or Ex-Partner: Not on file    Emotionally Abused: Not on file    Physically Abused: Not on file    Sexually Abused: Not on file   Housing Stability:     Unable to Pay for Housing in the Last Year: Not on file    Number of [place] in the Last Year: Not on file    Unstable Housing in the Last Year: Not on file        No family history on file. ADVANCE DIRECTIVE: N, <no information>    Vitals:    05/11/22 0818   BP: 115/67   Site: Left Upper Arm   Position: Sitting   Cuff Size: Medium Adult   Pulse: 60   Temp: 98.1 °F (36.7 °C)   TempSrc: Temporal   SpO2: 98%   Weight: 145 lb 12.8 oz (66.1 kg)   Height: 5' 6\" (1.676 m)     Estimated body mass index is 23.53 kg/m² as calculated from the following:    Height as of this encounter: 5' 6\" (1.676 m). Weight as of this encounter: 145 lb 12.8 oz (66.1 kg). Physical Exam  Constitutional:       General: He is not in acute distress. Appearance: He is well-developed. He is not diaphoretic. HENT:      Head: Normocephalic and atraumatic. Right Ear: There is impacted cerumen. Left Ear: There is impacted cerumen. Cardiovascular:      Rate and Rhythm: Normal rate and regular rhythm. Heart sounds: Normal heart sounds. No murmur heard. Pulmonary:      Effort: Pulmonary effort is normal. No respiratory distress. Breath sounds: Normal breath sounds. No wheezing. Abdominal:      General: Bowel sounds are normal. There is no distension. Palpations: Abdomen is soft. Tenderness: There is no abdominal tenderness. Skin:     General: Skin is warm and dry. Capillary Refill: Capillary refill takes less than 2 seconds. Findings: No erythema. Neurological:      Mental Status: He is alert and oriented to person, place, and time. Psychiatric:         Behavior: Behavior normal.         No flowsheet data found.     Lab Results   Component Value Date    CHOL 185 11/08/2021    CHOLFAST 208 08/27/2019    TRIG 56 11/08/2021    TRIGLYCFAST 59 08/27/2019    HDL 58 11/08/2021    HDL 69 08/27/2019    LDLCALC 116 11/08/2021    LDLCALC 127 08/27/2019    GLUCOSE 91 04/25/2022    LABA1C 5.5 08/27/2019       The 10-year ASCVD risk score (Josselyn Toney, et al., 2013) is: 18.3%    Values used to calculate the score:      Age: 66 years      Sex: Male      Is Non- : No      Diabetic: No      Tobacco smoker: No      Systolic Blood Pressure: 044 mmHg      Is BP treated: Yes      HDL Cholesterol: 58 mg/dL      Total Cholesterol: 185 mg/dL    Immunization History   Administered Date(s) Administered    COVID-19, Moderna, Booster, PF, 50mcg/0.25ml 11/29/2021    COVID-19, Moderna, Primary or Immunocompromised, PF, 100mcg/0.5mL 11/29/2021    COVID-19, Pfizer Purple top, DILUTE for use, 12+ yrs, 30mcg/0.3mL dose 02/28/2021, 03/21/2021    Pneumococcal Polysaccharide ([id number]) 11/22/2021    Tdap (Boostrix, Adacel) 11/08/2021       Health Maintenance   Topic Date Due    Annual Wellness Visit (AWV)  Never done    Shingles vaccine (1 of 2) Never done    COVID-19 Vaccine (3 - Pfizer risk 4-dose series) 12/27/2021    Flu vaccine (Season Ended) 09/01/2022    Depression Screen  11/08/2022    Pneumococcal 65+ years Vaccine (2 - PCV) 11/22/2022    DTaP/Tdap/Td vaccine (2 - Td or Tdap) 11/08/2031    Hepatitis A vaccine  Aged Out    Hepatitis B vaccine  Aged Out    Hib vaccine  Aged Out    Meningococcal (ACWY) vaccine  Aged Out       Assessment & Plan   Bilateral impacted cerumen  - Left ear decreased hearing. No itching, drainage.  - Impacted cerumen bilaterally  - Instructed to use debrox daily  - Will refer to ENT for removal  -     Heron Atwood MD, Otolaryngology, [place]    Pedal edema  - Improving. On lasix 40mg daily per nephrology  - Echo reviewed, normal.    AQUILINO (acute kidney injury) ([facility]  - Labs reviewed.  Kidney function returned to normal    Abnormal LFTs  - No clear source. Likely from sepsis during hospitalization. Has labs pending before going to next GI follow up appointment. Return in about 3 months (around 8/11/2022), or if symptoms worsen or fail to improve, for Office follow-up.          --Tasha Cueva MD

## 2022-07-11 LAB — PROSTATE SPECIFIC ANTIGEN: 0.09 NG/ML (ref 0–4)

## 2022-07-26 ENCOUNTER — ANESTHESIA EVENT (OUTPATIENT)
Dept: ENDOSCOPY | Age: 72
End: 2022-07-26
Payer: MEDICARE

## 2022-07-27 ENCOUNTER — HOSPITAL ENCOUNTER (OUTPATIENT)
Age: 72
Setting detail: OUTPATIENT SURGERY
Discharge: HOME | End: 2022-07-27
Attending: INTERNAL MEDICINE | Admitting: INTERNAL MEDICINE
Payer: MEDICARE

## 2022-07-27 ENCOUNTER — ANESTHESIA (OUTPATIENT)
Dept: ENDOSCOPY | Age: 72
End: 2022-07-27
Payer: MEDICARE

## 2022-07-27 VITALS
HEIGHT: 66 IN | WEIGHT: 152 LBS | DIASTOLIC BLOOD PRESSURE: 79 MMHG | RESPIRATION RATE: 16 BRPM | TEMPERATURE: 98.1 F | BODY MASS INDEX: 24.43 KG/M2 | SYSTOLIC BLOOD PRESSURE: 139 MMHG | HEART RATE: 60 BPM | OXYGEN SATURATION: 95 %

## 2022-07-27 DIAGNOSIS — D50.9 IRON DEFICIENCY ANEMIA, UNSPECIFIED IRON DEFICIENCY ANEMIA TYPE: ICD-10-CM

## 2022-07-27 DIAGNOSIS — Z12.11 COLON CANCER SCREENING: ICD-10-CM

## 2022-07-27 PROCEDURE — 3700000001 HC ADD 15 MINUTES (ANESTHESIA): Performed by: INTERNAL MEDICINE

## 2022-07-27 PROCEDURE — 88312 SPECIAL STAINS GROUP 1: CPT

## 2022-07-27 PROCEDURE — 2709999900 HC NON-CHARGEABLE SUPPLY: Performed by: INTERNAL MEDICINE

## 2022-07-27 PROCEDURE — 88342 IMHCHEM/IMCYTCHM 1ST ANTB: CPT

## 2022-07-27 PROCEDURE — 6360000002 HC RX W HCPCS: Performed by: NURSE ANESTHETIST, CERTIFIED REGISTERED

## 2022-07-27 PROCEDURE — 3700000000 HC ANESTHESIA ATTENDED CARE: Performed by: INTERNAL MEDICINE

## 2022-07-27 PROCEDURE — 7100000011 HC PHASE II RECOVERY - ADDTL 15 MIN: Performed by: INTERNAL MEDICINE

## 2022-07-27 PROCEDURE — 3609012400 HC EGD TRANSORAL BIOPSY SINGLE/MULTIPLE: Performed by: INTERNAL MEDICINE

## 2022-07-27 PROCEDURE — 3609027000 HC COLONOSCOPY: Performed by: INTERNAL MEDICINE

## 2022-07-27 PROCEDURE — 7100000010 HC PHASE II RECOVERY - FIRST 15 MIN: Performed by: INTERNAL MEDICINE

## 2022-07-27 PROCEDURE — 2580000003 HC RX 258: Performed by: NURSE ANESTHETIST, CERTIFIED REGISTERED

## 2022-07-27 PROCEDURE — 2580000003 HC RX 258: Performed by: ANESTHESIOLOGY

## 2022-07-27 PROCEDURE — 88305 TISSUE EXAM BY PATHOLOGIST: CPT

## 2022-07-27 RX ORDER — SODIUM CHLORIDE, SODIUM LACTATE, POTASSIUM CHLORIDE, CALCIUM CHLORIDE 600; 310; 30; 20 MG/100ML; MG/100ML; MG/100ML; MG/100ML
INJECTION, SOLUTION INTRAVENOUS CONTINUOUS PRN
Status: DISCONTINUED | OUTPATIENT
Start: 2022-07-27 | End: 2022-07-27 | Stop reason: SDUPTHER

## 2022-07-27 RX ORDER — SODIUM CHLORIDE, SODIUM LACTATE, POTASSIUM CHLORIDE, CALCIUM CHLORIDE 600; 310; 30; 20 MG/100ML; MG/100ML; MG/100ML; MG/100ML
INJECTION, SOLUTION INTRAVENOUS CONTINUOUS
Status: DISCONTINUED | OUTPATIENT
Start: 2022-07-27 | End: 2022-07-27 | Stop reason: HOSPADM

## 2022-07-27 RX ORDER — PANTOPRAZOLE SODIUM 40 MG/1
40 TABLET, DELAYED RELEASE ORAL
Qty: 30 TABLET | Refills: 5 | Status: SHIPPED | OUTPATIENT
Start: 2022-07-27

## 2022-07-27 RX ORDER — PROPOFOL 10 MG/ML
INJECTION, EMULSION INTRAVENOUS PRN
Status: DISCONTINUED | OUTPATIENT
Start: 2022-07-27 | End: 2022-07-27 | Stop reason: SDUPTHER

## 2022-07-27 RX ADMIN — PROPOFOL 100 MCG/KG/MIN: 10 INJECTION, EMULSION INTRAVENOUS at 08:59

## 2022-07-27 RX ADMIN — SODIUM CHLORIDE, POTASSIUM CHLORIDE, SODIUM LACTATE AND CALCIUM CHLORIDE: 600; 310; 30; 20 INJECTION, SOLUTION INTRAVENOUS at 08:09

## 2022-07-27 RX ADMIN — SODIUM CHLORIDE, SODIUM LACTATE, POTASSIUM CHLORIDE, AND CALCIUM CHLORIDE: .6; .31; .03; .02 INJECTION, SOLUTION INTRAVENOUS at 08:42

## 2022-07-27 ASSESSMENT — LIFESTYLE VARIABLES: SMOKING_STATUS: 0

## 2022-07-27 NOTE — ANESTHESIA POSTPROCEDURE EVALUATION
Department of Anesthesiology  Postprocedure Note    Patient: Jasmyne Carrera  MRN: [id number]  [place]: 1950  Date of evaluation: 7/27/2022      Procedure Summary     Date: 07/27/22 Room / Location: [address] [place] [address] Amandeep Oliva  / [facility]    Anesthesia Start: 0890 Anesthesia Stop: 0935    Procedures:       EGD BIOPSY      COLONOSCOPY Diagnosis:       Colon cancer screening      Iron deficiency anemia, unspecified iron deficiency anemia type      (Colon cancer screening [Z12.11])      (Iron deficiency anemia, unspecified iron deficiency anemia type [D50.9])    Surgeons: Tarsha Cardenas MD Responsible Provider: Phuc Sanchez MD    Anesthesia Type: MAC ASA Status: 2          Anesthesia Type: No value filed.     Rafael Phase I: Rafael Score: 10    Rafael Phase II: Rafael Score: 10      Anesthesia Post Evaluation    Patient location during evaluation: bedside  Level of consciousness: awake and alert  Airway patency: patent  Nausea & Vomiting: no vomiting  Complications: no  Cardiovascular status: blood pressure returned to baseline  Respiratory status: acceptable  Hydration status: euvolemic  Multimodal analgesia pain management approach

## 2022-07-27 NOTE — PROCEDURES
EGD/COLONOSCOPY PROCEDURE NOTE:        Patient: Jasmyne Carrera  :   Acct#:     Procedure:   Esophagogastroduodenoscopy with biopsy  Colonoscopy         Date:  2022     Surgeon:  Franny Mosqueda MD,     Referring Physician:  Ramses Marinelli MD      Preoperative Diagnosis:  h/o Iron deficiency anemia      Anesthesia:    MAC      Consent:  The patient or their legal guardian has signed an informed consent, and is aware of the potential risks, benefits, alternatives, and potential complications of this procedure. These include, but are not limited to hemorrhage, bleeding, post procedural pain, perforation, phlebitis, aspiration, hypotension, hypoxia, cardiovascular events such as arryhthmia, and possibly death. EGD PROCEDURE NOTE        Description of Procedure: The patient was then taken to the endoscopy suite, placed in the left lateral decubitus position and the above IV sedation was administrered. The Olympus video endoscope was placed through the patient's oropharynx without difficulty to the extent of the 2nd portion of the duodenum. The patient tolerated the procedure well and was taken to the post anesthesia care unit in good condition. Complications: None  EBL: none      Findings:  Esophagus:  Visualization of the esophagus demonstrated GE junction ulcer, 6 to 7 mm in size, biopsies were obtained. Grade B esophagitis also seen. .     Stomach: The scope was then advanced into the stomach. Both forward and retroflexed views of the stomach were obtained. Visualization of the gastric body and antrum demonstrated mild erythema, biopsies were obtained. A retroflexed exam of the gastric cardia and fundus demonstrated normal mucosa. The pylorus was patent and the scope was advanced into the duodenum. Duodenum: Visualization of the duodenal bulb and the second portion of the duodenum demonstrated normal mucosa, biopsies were obtained.   The scope was then withdrawn back into the stomach, it was decompressed, and the scope was completely withdrawn. Impression:    Grade B esophagitis  GE junction ulcer, biopsies obtained  Antral gastritis            COLONOSCOPY PROCEDURE NOTE      Procedure description:   An informed consent was obtained from the patient after explanation of indications, benefits, possible risks and complications of the procedure. The patient was then taken to the endoscopy suite, placed in the left lateral decubitus position, and the above IV anesthesia was administered. A digital rectal examination was performed and revealed negative without mass, lesions or tenderness, external hemorrhoids noted. The Olympus video colonoscope was placed in the patient's rectum under digital direction and advanced to the cecum. The cecum was identified by IC valve and appendiceal orifice. The prep was good. The ileocecal valve was identified and  intubated. The scope was then withdrawn back through the cecum, ascending, transverse, descending and sigmoid colons. Careful circumferential examination of the mucosa was performed. The scope was then withdrawn into the rectum and retroflexed. The scope was straightened, the colon was decompressed and the scope was withdrawn from the patient. The patient tolerated the procedure well and was taken to the recovery room in good condition. Complications: none  EBL: None    Findings:  Visualization of the terminal ileum demonstrated normal mucosa. The mucosa in cecum, ascending colon, transverse colon, descending colon, sigmoid colon and rectum was normal.  Several mild to moderate size diverticula seen in the sigmoid colon. Medium size internal hemorrhoids seen upon retroflexion in the rectum. Impression:    Left-sided diverticulosis  Large external hemorrhoids and medium size internal hemorrhoids  Normal mucosa seen otherwise throughout the exam up to terminal ileum            Recommendations:     Follow-up pathology results  Recommend high-fiber diet/Metamucil daily  Recommend Protonix daily for esophagitis/GE junction ulcer  Diet and lifestyle instructions for GERD as discussed  Recommend repeating EGD in 2 months to check for healing of GE junction ulcer  No further surveillance colonoscopy recommended due to his age          Liz Ng, [address] [place] [address]  [phone]    7/27/2022

## 2022-07-27 NOTE — ANESTHESIA PRE PROCEDURE
tobacco: Never   Substance Use Topics    Alcohol use: Yes     Comment: occ                                 Counseling given: Not Answered      Vital Signs (Current): There were no vitals filed for this visit. BP Readings from Last 3 Encounters:   06/14/22 137/63   05/11/22 115/67   04/20/22 113/68       NPO Status:                                                                                 BMI:   Wt Readings from Last 3 Encounters:   06/14/22 152 lb (68.9 kg)   05/11/22 145 lb 12.8 oz (66.1 kg)   04/20/22 153 lb 4.8 oz (69.5 kg)     There is no height or weight on file to calculate BMI.    CBC:   Lab Results   Component Value Date/Time    WBC 4.1 05/24/2022 08:08 AM    RBC 3.20 05/24/2022 08:08 AM    HGB 9.5 05/24/2022 08:08 AM    HCT 29.0 05/24/2022 08:08 AM    MCV 90.6 05/24/2022 08:08 AM    RDW 17.7 05/24/2022 08:08 AM     05/24/2022 08:08 AM       CMP:   Lab Results   Component Value Date/Time     04/25/2022 10:42 AM    K 3.9 04/25/2022 10:42 AM    K 3.6 03/29/2022 11:35 AM    CL 99 04/25/2022 10:42 AM    CO2 26 04/25/2022 10:42 AM    BUN 21 04/25/2022 10:42 AM    CREATININE 1.1 04/25/2022 10:42 AM    GFRAA >60 04/25/2022 10:42 AM    AGRATIO 0.8 03/29/2022 11:35 AM    LABGLOM >60 04/25/2022 10:42 AM    GLUCOSE 91 04/25/2022 10:42 AM    PROT 6.1 05/24/2022 08:08 AM    CALCIUM 8.6 04/25/2022 10:42 AM    BILITOT 0.3 05/24/2022 08:08 AM    ALKPHOS 82 05/24/2022 08:08 AM    AST 18 05/24/2022 08:08 AM    ALT 13 05/24/2022 08:08 AM       POC Tests: No results for input(s): POCGLU, POCNA, POCK, POCCL, POCBUN, POCHEMO, POCHCT in the last 72 hours.     Coags:   Lab Results   Component Value Date/Time    PROTIME 11.8 03/29/2022 11:35 AM    INR 1.04 03/29/2022 11:35 AM    APTT 30.7 03/29/2022 08:58 PM       HCG (If Applicable): No results found for: PREGTESTUR, PREGSERUM, HCG, HCGQUANT     ABGs: No results found for: PHART, PO2ART, NDW1ZJN, RAB2SSB, BEART, N1JXUWHX Type & Screen (If Applicable):  No results found for: LABABO, LABRH    Drug/Infectious Status (If Applicable):  No results found for: HIV, HEPCAB    COVID-19 Screening (If Applicable):   Lab Results   Component Value Date/Time    COVID19 Not Detected 03/29/2022 04:52 PM           Anesthesia Evaluation    Airway: Mallampati: III  TM distance: >3 FB   Neck ROM: full  Mouth opening: > = 3 FB   Dental:    (+) poor dentition      Pulmonary: breath sounds clear to auscultation  (+) asthma (controlled):     (-) COPD, sleep apnea and not a current smoker                          PE comment: L side wheezing cleared with cough Cardiovascular:    (+) hypertension:,     (-) past MI, CAD, CABG/stent, dysrhythmias,  angina and  CHF      Rhythm: regular  Rate: normal                    Neuro/Psych:      (-) seizures, TIA and CVA           GI/Hepatic/Renal:        (-) GERD, hepatitis and liver disease       Endo/Other:    (+) blood dyscrasia: thrombocytopenia:., malignancy/cancer (prostate ca , last radiation was May 4, 2022). (-) diabetes mellitus, hypothyroidism, hyperthyroidism               Abdominal:             Vascular:     - DVT and PE. Other Findings:           Anesthesia Plan      MAC     ASA 2       Induction: intravenous. Anesthetic plan and risks discussed with patient. Plan discussed with CRNA.                     Myriam Torres MD   7/27/2022

## 2022-07-27 NOTE — DISCHARGE INSTRUCTIONS
ENDOSCOPY DISCHARGE INSTRUCTIONS:    Call the physician that did your procedure for any questions or concern:    [facility]: [phone]  DR. FRANK [facility]        ACTIVITY:    There are potential side effects to the medications used for sedation and anesthesia during your procedure. These include:  Dizziness or light-headedness, confusion or memory loss, delayed reaction times, loss of coordination, nausea and vomiting. Because of your increased risk for injury, we ask that you observe the following precautions: For the next 24 hours,  DO NOT operate an automobile, bicycle, motorcycle, , power tools or large equipment of any kind. Do not drink alcohol, sign any legal documents or make any legal decisions for 24 hours. Do not bend your head over lower than your heart. DO sit on the side of bed/couch awhile before getting up. Plan on bedrest or quiet relaxation today. You may resume normal activities in 24 hours. DIET:    Your first meal today should be light, avoiding spicy and fatty foods. If you tolerate this first meal, then you may advance to your regular diet unless otherwise advised by your physician. NORMAL SYMPTOMS:  -Mild sore throat if youve had an EGD   -Gaseous discomfort    NOTIFY YOUR PHYSICIAN IF THESE SYMPTOMS OCCUR:  1. Fever (greater than 100)  5. Increased abdominal bloating  2. Severe pain    6. Excessive bleeding  3. Nausea and vomiting  7. Chest pain                                                                    4. Chills    8. Shortness of breath    ADDITIONAL INSTRUCTIONS:    Biopsy results: Call [address],[facility] for biopsy results in 1 week    Educational Information:    Findings:  Esophagus:  Visualization of the esophagus demonstrated GE junction ulcer, 6 to 7 mm in size, biopsies were obtained. Grade B esophagitis also seen. .     Stomach: The scope was then advanced into the stomach. Both forward and retroflexed views of the stomach were obtained. Visualization of the gastric body and antrum demonstrated mild erythema, biopsies were obtained. A retroflexed exam of the gastric cardia and fundus demonstrated normal mucosa. The pylorus was patent and the scope was advanced into the duodenum. Duodenum: Visualization of the duodenal bulb and the second portion of the duodenum demonstrated normal mucosa, biopsies were obtained. The scope was then withdrawn back into the stomach, it was decompressed, and the scope was completely withdrawn. EGD:    Impression:    Grade B esophagitis  GE junction ulcer, biopsies obtained  Antral gastritis      COLONOSCOPY:    Findings:  Visualization of the terminal ileum demonstrated normal mucosa. The mucosa in cecum, ascending colon, transverse colon, descending colon, sigmoid colon and rectum was normal.  Several mild to moderate size diverticula seen in the sigmoid colon. Medium size internal hemorrhoids seen upon retroflexion in the rectum. Impression:    Left-sided diverticulosis  Large external hemorrhoids and medium size internal hemorrhoids  Normal mucosa seen otherwise throughout the exam up to terminal ileum              Recommendations: Follow-up pathology results  Recommend high-fiber diet/Metamucil daily  Recommend Protonix daily for esophagitis/GE junction ulcer  Diet and lifestyle instructions for GERD as discussed  Recommend repeating EGD in 2 months to check for healing of GE junction ulcer  No further surveillance colonoscopy recommended due to his age       Please review these discharge instructions this evening or tomorrow for  information you may have forgotten. We want to thank you for choosing the [facility] as your health care provider. We always strive to provide you with excellent care while you are here. You may receive a survey in the mail regarding your care. We would appreciate you taking a few minutes of your time to complete this survey.

## 2022-07-27 NOTE — PROGRESS NOTES
Ambulatory Surgery/Procedure Discharge Note    Vitals:    07/27/22 0951   BP: 139/79   Pulse: 60   Resp: 16   Temp:    SpO2: 95%       In: 600 [I.V.:600]  Out: - Sips of water; declined offer of bathroom    Restroom use offered before discharge. Yes declined    Pain assessment:  none       Pt returned to Endo recovery s/p EGD and colonoscopy. By discharge, full alert status; breathing easily on RA; denies any pain; Dr. Yanick Plaza came to bedside to talk with sister. IV removed and dressing applied. Discharge instructions discussed with pt and pt's sister, and both verbalized understanding. Patient discharged to home/self care.  Patient discharged via wheel chair by transporter to waiting family/S.O.       7/27/2022 10:13 AM

## 2022-07-27 NOTE — H&P
Pre-operative History and Physical    Patient: Jean Sebastian  : 1950  Acct#:     History Obtained From:  patient    HISTORY OF PRESENT ILLNESS:    The patient is a 67 y.o. male  who presents with iron def anemia    Past Medical History:        Diagnosis Date    Asthma     Environmental allergies     Inguinal hernia     Prostate cancer [facility])      Past Surgical History:        Procedure Laterality Date    EYE SURGERY      INGUINAL HERNIA REPAIR Bilateral 2019    LAPAROSCOPIC BILATERAL INGUINAL HERNIA REPAIR WITH MESH performed by Rafael West MD at [facility] 80      back      Medications Prior to Admission:   No current facility-administered medications on file prior to encounter. Current Outpatient Medications on File Prior to Encounter   Medication Sig Dispense Refill    FERROUS GLUCONATE PO Take by mouth every other day      tamsulosin (FLOMAX) 0.4 MG capsule TAKE 1 CAPSULE BY MOUTH EVERY DAY 30 capsule 5    acetaminophen (TYLENOL) 500 MG tablet Take 500 mg by mouth every 6 hours as needed for Pain          Allergies: Other    History of allergic reaction to anesthesia:  No    PHYSICAL EXAM:      /77   Pulse 60   Temp 98.1 °F (36.7 °C) (Temporal)   Resp 16   Ht 5' 6\" (1.676 m)   Wt 152 lb (68.9 kg)   SpO2 97%   BMI 24.53 kg/m²  I        Heart:  Normal apical impulse, regular rate and rhythm, normal S1 and S2, no S3 or S4, and no murmur noted    Lungs:  No increased work of breathing, good air exchange, clear to auscultation bilaterally, no crackles or wheezing    Abdomen:  No scars, normal bowel sounds, soft, non-distended, non-tender, no masses palpated, no hepatosplenomegally      ASA Grade:  ASA 3 - Patient with moderate systemic disease with functional limitations      ASSESSMENT AND PLAN:    1. Patient is a 67 y.o. male here for EGD/colonoscopy with anesthesia  2. Procedure options, risks and benefits reviewed with patient.   Patient expresses understanding.             Tato Iglesias MD  [facility]  [phone]

## 2022-08-10 ENCOUNTER — OFFICE VISIT (OUTPATIENT)
Dept: INTERNAL MEDICINE CLINIC | Age: 72
End: 2022-08-10
Payer: MEDICARE

## 2022-08-10 VITALS
HEART RATE: 58 BPM | DIASTOLIC BLOOD PRESSURE: 79 MMHG | RESPIRATION RATE: 18 BRPM | BODY MASS INDEX: 24.73 KG/M2 | WEIGHT: 153.2 LBS | TEMPERATURE: 97.1 F | SYSTOLIC BLOOD PRESSURE: 148 MMHG | OXYGEN SATURATION: 98 %

## 2022-08-10 DIAGNOSIS — Z00.00 HEALTH CARE MAINTENANCE: Primary | ICD-10-CM

## 2022-08-10 PROCEDURE — G0009 ADMIN PNEUMOCOCCAL VACCINE: HCPCS

## 2022-08-10 PROCEDURE — 96372 THER/PROPH/DIAG INJ SC/IM: CPT

## 2022-08-10 PROCEDURE — 99213 OFFICE O/P EST LOW 20 MIN: CPT

## 2022-08-10 PROCEDURE — 90471 IMMUNIZATION ADMIN: CPT

## 2022-08-10 ASSESSMENT — ENCOUNTER SYMPTOMS
EYES NEGATIVE: 1
RESPIRATORY NEGATIVE: 1
GASTROINTESTINAL NEGATIVE: 1

## 2022-08-10 NOTE — PROGRESS NOTES
Outpatient Clinic Established Patient Note    Patient: Guido Bowers  : [birth year] (67 y.o.)  Date: 8/10/2022    CC: Follow up visit     HPI:    Pt is a 66year old male with hx of advanced metastatic prostate cancer who is here for a follow up and believes he is supposed to get a shot today but can't remember what shot he is supposed to get. He says all he knows is he wrote on his calender he was supposed to get a shot. He has no complaints at this time and says he is otherwise doing well. He has all of his medications. He recently had a colonoscopy which showed no polyps and an EGD which showed an ulcer at his lower esophagus and biopsy was taken which showed it was benign. Home Meds:  Prior to Visit Medications    Medication Sig Taking? Authorizing Provider   pantoprazole (PROTONIX) 40 MG tablet Take 1 tablet by mouth every morning (before breakfast) Yes Martha Vega MD   furosemide (LASIX) 20 MG tablet Take 2 tablets by mouth daily Yes Thomas Hall MD   FERROUS GLUCONATE PO Take by mouth every other day Yes Historical Provider, MD   tamsulosin (FLOMAX) 0.4 MG capsule TAKE 1 CAPSULE BY MOUTH EVERY DAY Yes Fredo Arvizu MD   acetaminophen (TYLENOL) 500 MG tablet Take 500 mg by mouth every 6 hours as needed for Pain Yes Historical Provider, MD       Allergies:     Other    Health Maintenance Due   Topic Date Due    Annual Wellness Visit (AWV)  Never done    Shingles vaccine (1 of 2) Never done    COVID-19 Vaccine (3 - Pfizer risk series) 2021       Immunization History   Administered Date(s) Administered    COVID-19, MODERNA BLUE border, Primary or Immunocompromised, (age 12y+), IM, 100 mcg/0.5mL 2021    COVID-19, MODERNA Booster BLUE border, (age 18y+), IM, 50mcg/0.25mL 2021    COVID-19, PFIZER PURPLE top, DILUTE for use, (age 15 y+), 30mcg/0.3mL 2021, 2021    Pneumococcal Polysaccharide ([id number]) 2021    Tdap (Boostrix, Adacel) 2021       Review of Systems   Constitutional: Negative. HENT: Negative. Eyes: Negative. Respiratory: Negative. Cardiovascular: Negative. Gastrointestinal: Negative. Genitourinary: Negative. Musculoskeletal: Negative. Skin: Negative. Neurological: Negative. Psychiatric/Behavioral: Negative. Data: Old records have been reviewed electronically. PHYSICAL EXAM:  BP (!) 148/79 (Site: Right Upper Arm, Position: Sitting, Cuff Size: Medium Adult)   Pulse 58   Temp 97.1 °F (36.2 °C) (Temporal)   Resp 18   Wt 153 lb 3.2 oz (69.5 kg)   SpO2 98%   BMI 24.73 kg/m²   Physical Exam  Vitals reviewed. Constitutional:       Appearance: Normal appearance. He is normal weight. HENT:      Head: Normocephalic and atraumatic. Eyes:      Extraocular Movements: Extraocular movements intact. Conjunctiva/sclera: Conjunctivae normal.      Pupils: Pupils are equal, round, and reactive to light. Cardiovascular:      Rate and Rhythm: Normal rate and regular rhythm. Heart sounds: Normal heart sounds. Pulmonary:      Effort: Pulmonary effort is normal.      Breath sounds: Normal breath sounds. Abdominal:      General: Abdomen is flat. Bowel sounds are normal.      Palpations: Abdomen is soft. Musculoskeletal:         General: Normal range of motion. Skin:     General: Skin is warm and dry. Neurological:      General: No focal deficit present. Mental Status: He is alert and oriented to person, place, and time. Mental status is at baseline. Psychiatric:         Mood and Affect: Mood normal.         Behavior: Behavior normal.       Assessment & Plan:      Lower extremity edema  -Pt states that he has been compliant with his medications and the swelling in his legs has been well controlled  -Continue taking lasix 20mg  daily    Metastatic prostate cancer  -Pt received treatment back in march 2022 and is currently in remission.  PSA on 7/11 was 0.09   -Continue taking flomax 0.4mg daily    Immunizations  -Pt had already received his pneumo 23 vaccine so he was given his pneumo 20 vaccine today to complete his pneumo vaccinations. Return in about 4 months (around 12/10/2022) for Follow up.     Dispo: Pt has been staffed with Dr. Erik Turk MD  _______________  Siva Damian DO, 8/10/2022 8:54 AM   PGY-1

## 2022-09-22 ENCOUNTER — ANESTHESIA EVENT (OUTPATIENT)
Dept: ENDOSCOPY | Age: 72
End: 2022-09-22
Payer: MEDICARE

## 2022-09-22 NOTE — PROGRESS NOTES
ENDOSCOPY PREOP INSTRUCTIONS      You are scheduled for a procedure at [facility] on 9-23 @ 915. You will need to arrival by: 745 (at least an hour & a half prior to planned start time)  Report to the MAIN entrance on [address] and register at the surgery center on the left-hand side of the lobby  You will need your insurance card and photo id    For your procedure:     PLEASE FOLLOW ALL INSTRUCTIONS & PREPS GIVEN TO YOU BY YOUR DOCTOR'S OFFICE. If you have not received these instructions yet, please call the office immediately. Make sure to read them as soon as received. Bring an accurate list of any medications, including the dose/ frequency, with you on the day of the procedure. Make sure to include over the counter medications. If you are taking blood thinners, Aspirin or diabetic medication, make sure to call your doctor as soon as possible for instructions prior to your procedure. Please dress comfortably and do not wear any lotion, powders or jewelry  Arrange for someone to be with you and sign you out & drive you home after your procedure. We allow 2 adults visitors with you in the hospital & everyone is required to wear a mask.     WOMEN ONLY OF CHILDBEARING AGE: Please make sure to be able to give a urine sample on arrival      If you have further questions, you may contact your Endoscopist's office or Pre Admission Testing staff at [phone]

## 2022-09-23 ENCOUNTER — HOSPITAL ENCOUNTER (OUTPATIENT)
Age: 72
Setting detail: OUTPATIENT SURGERY
Discharge: HOME | End: 2022-09-23
Attending: INTERNAL MEDICINE | Admitting: INTERNAL MEDICINE
Payer: MEDICARE

## 2022-09-23 ENCOUNTER — ANESTHESIA (OUTPATIENT)
Dept: ENDOSCOPY | Age: 72
End: 2022-09-23
Payer: MEDICARE

## 2022-09-23 VITALS
OXYGEN SATURATION: 100 % | RESPIRATION RATE: 16 BRPM | SYSTOLIC BLOOD PRESSURE: 132 MMHG | DIASTOLIC BLOOD PRESSURE: 68 MMHG | HEIGHT: 66 IN | BODY MASS INDEX: 24.43 KG/M2 | WEIGHT: 152 LBS | HEART RATE: 51 BPM | TEMPERATURE: 97.6 F

## 2022-09-23 DIAGNOSIS — K22.10 ULCER OF ESOPHAGUS WITHOUT BLEEDING: ICD-10-CM

## 2022-09-23 PROCEDURE — 88305 TISSUE EXAM BY PATHOLOGIST: CPT

## 2022-09-23 PROCEDURE — 2709999900 HC NON-CHARGEABLE SUPPLY: Performed by: INTERNAL MEDICINE

## 2022-09-23 PROCEDURE — 7100000011 HC PHASE II RECOVERY - ADDTL 15 MIN: Performed by: INTERNAL MEDICINE

## 2022-09-23 PROCEDURE — 2580000003 HC RX 258: Performed by: ANESTHESIOLOGY

## 2022-09-23 PROCEDURE — 3609012400 HC EGD TRANSORAL BIOPSY SINGLE/MULTIPLE: Performed by: INTERNAL MEDICINE

## 2022-09-23 PROCEDURE — 6360000002 HC RX W HCPCS

## 2022-09-23 PROCEDURE — 2500000003 HC RX 250 WO HCPCS

## 2022-09-23 PROCEDURE — 3700000000 HC ANESTHESIA ATTENDED CARE: Performed by: INTERNAL MEDICINE

## 2022-09-23 PROCEDURE — 7100000010 HC PHASE II RECOVERY - FIRST 15 MIN: Performed by: INTERNAL MEDICINE

## 2022-09-23 RX ORDER — SODIUM CHLORIDE 0.9 % (FLUSH) 0.9 %
5-40 SYRINGE (ML) INJECTION EVERY 12 HOURS SCHEDULED
Status: DISCONTINUED | OUTPATIENT
Start: 2022-09-23 | End: 2022-09-23 | Stop reason: HOSPADM

## 2022-09-23 RX ORDER — SODIUM CHLORIDE 0.9 % (FLUSH) 0.9 %
5-40 SYRINGE (ML) INJECTION PRN
Status: DISCONTINUED | OUTPATIENT
Start: 2022-09-23 | End: 2022-09-23 | Stop reason: HOSPADM

## 2022-09-23 RX ORDER — SODIUM CHLORIDE, SODIUM LACTATE, POTASSIUM CHLORIDE, CALCIUM CHLORIDE 600; 310; 30; 20 MG/100ML; MG/100ML; MG/100ML; MG/100ML
INJECTION, SOLUTION INTRAVENOUS CONTINUOUS
Status: DISCONTINUED | OUTPATIENT
Start: 2022-09-23 | End: 2022-09-23 | Stop reason: HOSPADM

## 2022-09-23 RX ORDER — SODIUM CHLORIDE 9 MG/ML
INJECTION, SOLUTION INTRAVENOUS PRN
Status: DISCONTINUED | OUTPATIENT
Start: 2022-09-23 | End: 2022-09-23 | Stop reason: HOSPADM

## 2022-09-23 RX ORDER — LIDOCAINE HYDROCHLORIDE 20 MG/ML
INJECTION, SOLUTION EPIDURAL; INFILTRATION; INTRACAUDAL; PERINEURAL PRN
Status: DISCONTINUED | OUTPATIENT
Start: 2022-09-23 | End: 2022-09-23 | Stop reason: SDUPTHER

## 2022-09-23 RX ORDER — PROPOFOL 10 MG/ML
INJECTION, EMULSION INTRAVENOUS PRN
Status: DISCONTINUED | OUTPATIENT
Start: 2022-09-23 | End: 2022-09-23 | Stop reason: SDUPTHER

## 2022-09-23 RX ADMIN — PROPOFOL 25 MG: 10 INJECTION, EMULSION INTRAVENOUS at 09:50

## 2022-09-23 RX ADMIN — PROPOFOL 100 MG: 10 INJECTION, EMULSION INTRAVENOUS at 09:48

## 2022-09-23 RX ADMIN — SODIUM CHLORIDE, POTASSIUM CHLORIDE, SODIUM LACTATE AND CALCIUM CHLORIDE: 600; 310; 30; 20 INJECTION, SOLUTION INTRAVENOUS at 09:42

## 2022-09-23 RX ADMIN — PROPOFOL 25 MG: 10 INJECTION, EMULSION INTRAVENOUS at 09:52

## 2022-09-23 RX ADMIN — LIDOCAINE HYDROCHLORIDE 100 MG: 20 INJECTION, SOLUTION EPIDURAL; INFILTRATION; INTRACAUDAL; PERINEURAL at 09:48

## 2022-09-23 RX ADMIN — PROPOFOL 10 MG: 10 INJECTION, EMULSION INTRAVENOUS at 09:55

## 2022-09-23 ASSESSMENT — PAIN - FUNCTIONAL ASSESSMENT: PAIN_FUNCTIONAL_ASSESSMENT: 0-10

## 2022-09-23 ASSESSMENT — LIFESTYLE VARIABLES: SMOKING_STATUS: 0

## 2022-09-23 ASSESSMENT — PAIN SCALES - GENERAL
PAINLEVEL_OUTOF10: 0
PAINLEVEL_OUTOF10: 0

## 2022-09-23 NOTE — DISCHARGE INSTRUCTIONS
ENDOSCOPY DISCHARGE INSTRUCTIONS:    Call the physician that did your procedure for any questions or concern:    [facility]: [phone]  DR. FRANK [facility]        ACTIVITY:    There are potential side effects to the medications used for sedation and anesthesia during your procedure. These include:  Dizziness or light-headedness, confusion or memory loss, delayed reaction times, loss of coordination, nausea and vomiting. Because of your increased risk for injury, we ask that you observe the following precautions: For the next 24 hours,  DO NOT operate an automobile, bicycle, motorcycle, , power tools or large equipment of any kind. Do not drink alcohol, sign any legal documents or make any legal decisions for 24 hours. Do not bend your head over lower than your heart. DO sit on the side of bed/couch awhile before getting up. Plan on bedrest or quiet relaxation today. You may resume normal activities in 24 hours. DIET:    Your first meal today should be light, avoiding spicy and fatty foods. If you tolerate this first meal, then you may advance to your regular diet unless otherwise advised by your physician. NORMAL SYMPTOMS:  -Mild sore throat if youve had an EGD   -Gaseous discomfort    NOTIFY YOUR PHYSICIAN IF THESE SYMPTOMS OCCUR:  1. Fever (greater than 100)  5. Increased abdominal bloating  2. Severe pain    6. Excessive bleeding  3. Nausea and vomiting  7. Chest pain                                                                    4. Chills    8. Shortness of breath    ADDITIONAL INSTRUCTIONS:    Biopsy results: Call [address] for biopsy results in 1 week    Educational Information:    Impression:    4 cm sliding hiatal hernia  Previously seen esophagitis and GE junction ulcer has healed completely           Recommendations:    Follow-up pathology results  Continue PPI daily  Diet and lifestyle instructions for GERD as discussed  Follow-up with us in the office in 3 months/as needed      Please review these discharge instructions this evening or tomorrow for  information you may have forgotten. We want to thank you for choosing the [facility] as your health care provider. We always strive to provide you with excellent care while you are here. You may receive a survey in the mail regarding your care. We would appreciate you taking a few minutes of your time to complete this survey.

## 2022-09-23 NOTE — ANESTHESIA PRE PROCEDURE
Department of Anesthesiology  Preprocedure Note       Name:  Se Yanes   Age:  67 y.o.  :  1950                                          MRN:  [id number]         Date:  2022      Surgeon: Leon Parekh):  Jesse Pierce MD    Procedure: Procedure(s):  ESOPHAGOGASTRODUODENOSCOPY    Medications prior to admission:   Prior to Admission medications    Medication Sig Start Date End Date Taking? Authorizing Provider   Calcium Carb-Cholecalciferol 600-500 MG-UNIT CAPS Take by mouth    Historical Provider, MD   pantoprazole (PROTONIX) 40 MG tablet Take 1 tablet by mouth every morning (before breakfast) 22   Jesse Pierce MD   furosemide (LASIX) 20 MG tablet Take 2 tablets by mouth daily 22   Jone Meeks MD   FERROUS GLUCONATE PO Take by mouth every other day    Historical Provider, MD   tamsulosin (FLOMAX) 0.4 MG capsule TAKE 1 CAPSULE BY MOUTH EVERY DAY 22   Eugenia Ndiaye MD   acetaminophen (TYLENOL) 500 MG tablet Take 500 mg by mouth every 6 hours as needed for Pain    Historical Provider, MD       Current medications:    No current facility-administered medications for this visit. No current outpatient medications on file. Facility-Administered Medications Ordered in Other Visits   Medication Dose Route Frequency Provider Last Rate Last Admin    lactated ringers infusion   IntraVENous Continuous Carolyn Nicole, DO        sodium chloride flush 0.9 % injection 5-40 mL  5-40 mL IntraVENous 2 times per day Carolyn Nicole, DO        sodium chloride flush 0.9 % injection 5-40 mL  5-40 mL IntraVENous PRN Carolyn Nicole, DO        0.9 % sodium chloride infusion   IntraVENous PRN Carolyn Nicole, DO           Allergies:     Allergies   Allergen Reactions    Other      dust,,animal dander,pollen       Problem List:    Patient Active Problem List   Diagnosis Code    Non-recurrent bilateral inguinal hernia without obstruction or gangrene K40.20    AQUILINO (acute kidney injury) ([facility] [address] N17.9    Hypovolemia E86.1    Increased anion gap metabolic acidosis S98.7    Thrombocytopenia (HCC) D69.6    Metastatic adenocarcinoma to prostate (HCC) C79.82    Pedal edema R60.0    Bilateral impacted cerumen H61.23    Abnormal LFTs R94.5       Past Medical History:        Diagnosis Date    Asthma     Environmental allergies     Inguinal hernia     Prostate cancer [facility])        Past Surgical History:        Procedure Laterality Date    COLONOSCOPY N/A 7/27/2022    COLONOSCOPY performed by Manuela Hays MD at [address] Bilateral 06/20/2019    LAPAROSCOPIC BILATERAL INGUINAL HERNIA REPAIR WITH MESH performed by Alex Ramires MD at [address] [facility]     UPPER GASTROINTESTINAL ENDOSCOPY N/A 7/27/2022    EGD BIOPSY performed by Manuela Hays MD at [facility] ENDOSCOPY       Social History:    Social History     Tobacco Use    Smoking status: Never    Smokeless tobacco: Never   Substance Use Topics    Alcohol use: Yes     Comment: OCCASIONAL                                Counseling given: Not Answered      Vital Signs (Current): There were no vitals filed for this visit.                                            BP Readings from Last 3 Encounters:   09/23/22 (!) 151/67   08/10/22 (!) 148/79   07/27/22 139/79       NPO Status:                                                                                 BMI:   Wt Readings from Last 3 Encounters:   09/23/22 152 lb (68.9 kg)   08/10/22 153 lb 3.2 oz (69.5 kg)   07/27/22 152 lb (68.9 kg)     There is no height or weight on file to calculate BMI.    CBC:   Lab Results   Component Value Date/Time    WBC 4.1 05/24/2022 08:08 AM    RBC 3.20 05/24/2022 08:08 AM    HGB 9.5 05/24/2022 08:08 AM    HCT 29.0 05/24/2022 08:08 AM    MCV 90.6 05/24/2022 08:08 AM    RDW 17.7 05/24/2022 08:08 AM     05/24/2022 08:08 AM       CMP:   Lab Results   Component Value Date/Time for orders placed or performed during the hospital encounter of 04/29/22  -ECHO Complete 2D W Doppler W Color:        Result                      Value             Ref Range           Left Ventricular Eject*     65                                    LVEF MODALITY               ECHO                                  Neuro/Psych:      (-) seizures, TIA and CVA           GI/Hepatic/Renal:        (-) GERD, hepatitis and liver disease       Endo/Other:    (+) blood dyscrasia::., malignancy/cancer (prostate ca , last radiation was May 4, 2022). (-) diabetes mellitus, hypothyroidism, hyperthyroidism               Abdominal:             Vascular:     - DVT and PE. Other Findings:             Anesthesia Plan      MAC     ASA 3       Induction: intravenous. Anesthetic plan and risks discussed with patient. Plan discussed with CRNA.     Attending anesthesiologist reviewed and agrees with Preprocedure content                Dony Hodges DO   9/23/2022

## 2022-09-23 NOTE — H&P
Pre-operative History and Physical    Patient: Pancho Arredondo  :   Acct#:     History Obtained From:  patient    HISTORY OF PRESENT ILLNESS:    The patient is a 67 y.o. male who presents with  GEJ ulcer    Past Medical History:        Diagnosis Date    Asthma     Environmental allergies     Inguinal hernia     Prostate cancer [facility])      Past Surgical History:        Procedure Laterality Date    COLONOSCOPY N/A 2022    COLONOSCOPY performed by Olga Lidia Null MD at [address] [facility] Bilateral 2019    LAPAROSCOPIC BILATERAL INGUINAL HERNIA REPAIR WITH MESH performed by Leo Yap MD at [facility]      back     UPPER GASTROINTESTINAL ENDOSCOPY N/A 2022    EGD BIOPSY performed by Olga Lidia Null MD at [facility] ENDOSCOPY     Medications Prior to Admission:   No current facility-administered medications on file prior to encounter. Current Outpatient Medications on File Prior to Encounter   Medication Sig Dispense Refill    Calcium Carb-Cholecalciferol 600-500 MG-UNIT CAPS Take by mouth      pantoprazole (PROTONIX) 40 MG tablet Take 1 tablet by mouth every morning (before breakfast) 30 tablet 5    furosemide (LASIX) 20 MG tablet Take 2 tablets by mouth daily 180 tablet 2    FERROUS GLUCONATE PO Take by mouth every other day      tamsulosin (FLOMAX) 0.4 MG capsule TAKE 1 CAPSULE BY MOUTH EVERY DAY 30 capsule 5    acetaminophen (TYLENOL) 500 MG tablet Take 500 mg by mouth every 6 hours as needed for Pain       Allergies:   Other    History of allergic reaction to anesthesia:  No      PHYSICAL EXAM:      BP (!) 151/67   Pulse (!) 49   Temp 97.5 °F (36.4 °C) (Temporal)   Resp 15   Ht 5' 6\" (1.676 m)   Wt 152 lb (68.9 kg)   SpO2 99%   BMI 24.53 kg/m²  I        Heart:  Normal apical impulse, regular rate and rhythm, normal S1 and S2, no S3 or S4, and no murmur noted    Lungs:  No increased work of breathing, good air exchange, clear to auscultation bilaterally, no crackles or wheezing    Abdomen:  No scars, normal bowel sounds, soft, non-distended, non-tender, no masses palpated, no hepatosplenomegally      ASA Grade:  ASA 3 - Patient with moderate systemic disease with functional limitations      ASSESSMENT AND PLAN:    1. Patient is a 67 y.o. male here for EGD with deep sedation  2. Procedure options, risks and benefits reviewed with patient. Patient expresses understanding.           Anuj Wynne MD  [id number]  [phone]

## 2022-09-23 NOTE — PROCEDURES
Endoscopy Note    Patient: Tomasz Ng  : [id number]  Acct#:     Procedure: Esophagogastroduodenoscopy with biopsy    Date:  2022     Surgeon:  Héctor Golden MD,     Referring Physician:  Brandy Jiang DO      Preoperative Diagnosis:    History of GE junction ulcer and esophagitis      Anesthesia: MAC anesthesia      Consent:  The patient or their legal guardian has signed an informed consent, and is aware of the potential risks, benefits, alternatives, and potential complications of this procedure. These include, but are not limited to hemorrhage, bleeding, post procedural pain, perforation, phlebitis, aspiration, hypotension, hypoxia, cardiovascular events such as arryhthmia, and possibly death. Description of Procedure: The patient was then taken to the endoscopy suite, placed in the left lateral decubitus position and the above IV sedation was administrered. The Olympus video endoscope was placed through the patient's oropharynx without difficulty to the extent of the 2nd portion of the duodenum. The patient tolerated the procedure well and was taken to the post anesthesia care unit in good condition. Complications: None  EBL: none      Findings:  Esophagus:  Visualization of the esophagus demonstrated normal mucosa. Previously seen ulcer and esophagitis has healed completely. 4 cm sliding hiatal hernia seen starting at 35 cm. .     Stomach: The scope was then advanced into the stomach. Both forward and retroflexed views of the stomach were obtained. Visualization of the gastric body and antrum demonstrated mild erythema, biopsies were obtained. A retroflexed exam of the gastric cardia and fundus demonstrated normal mucosa. The pylorus was patent and the scope was advanced into the duodenum. Duodenum: Visualization of the duodenal bulb and the second portion of the duodenum demonstrated normal mucosa.   The scope was then withdrawn back into the stomach, it was

## 2022-09-23 NOTE — ANESTHESIA POSTPROCEDURE EVALUATION
Department of Anesthesiology  Postprocedure Note    Patient: Magdalena Cowden  MRN: [id number]  [place]: 1950  Date of evaluation: 9/23/2022      Procedure Summary     Date: 09/23/22 Room / Location: [address] [place] [address] Amandeep Oliva 01 / The [address] [place] [address]    Anesthesia Start: 2084 Anesthesia Stop: 4002    Procedure: EGD BIOPSY Diagnosis:       Ulcer of esophagus without bleeding      (Ulcer of esophagus without bleeding [K22.10])    Surgeons: Suman Hurley MD Responsible Provider: Marylou Renae DO    Anesthesia Type: MAC ASA Status: 3          Anesthesia Type: No value filed.     Rafael Phase I: Rafael Score: 10    Rafael Phase II: Rafael Score: 10      Anesthesia Post Evaluation    Patient location during evaluation: PACU  Patient participation: complete - patient participated  Level of consciousness: awake and alert  Pain score: 0  Airway patency: patent  Nausea & Vomiting: no nausea and no vomiting  Complications: no  Cardiovascular status: hemodynamically stable  Respiratory status: acceptable  Hydration status: stable

## 2022-09-23 NOTE — PROGRESS NOTES
Ambulatory Surgery/Procedure Discharge Note    Vitals:    09/23/22 1022   BP: 132/68   Pulse: 51   Resp: 16   Temp: 97.6 °F (36.4 °C)   SpO2: 100%       In: 770 [P.O.:120; I.V.:650]  Out: -     Restroom use offered before discharge. Yes    Pain assessment:    Pain Level: 0    Denies pain or nausea. Patient discharged to home/self care.  Patient discharged via wheel chair by transporter to waiting family/S.O.       9/23/2022 1:33 PM

## 2022-10-11 LAB — PROSTATE SPECIFIC ANTIGEN: 0.1 NG/ML (ref 0–4)

## 2022-10-17 DIAGNOSIS — N40.1 BENIGN PROSTATIC HYPERPLASIA WITH URINARY FREQUENCY: ICD-10-CM

## 2022-10-17 DIAGNOSIS — R35.0 BENIGN PROSTATIC HYPERPLASIA WITH URINARY FREQUENCY: ICD-10-CM

## 2022-10-17 RX ORDER — TAMSULOSIN HYDROCHLORIDE 0.4 MG/1
CAPSULE ORAL
Qty: 30 CAPSULE | Refills: 0 | Status: SHIPPED | OUTPATIENT
Start: 2022-10-17

## 2022-11-17 DIAGNOSIS — R35.0 BENIGN PROSTATIC HYPERPLASIA WITH URINARY FREQUENCY: ICD-10-CM

## 2022-11-17 DIAGNOSIS — N40.1 BENIGN PROSTATIC HYPERPLASIA WITH URINARY FREQUENCY: ICD-10-CM

## 2022-11-17 RX ORDER — TAMSULOSIN HYDROCHLORIDE 0.4 MG/1
CAPSULE ORAL
Qty: 30 CAPSULE | Refills: 0 | Status: SHIPPED | OUTPATIENT
Start: 2022-11-17

## 2022-11-17 NOTE — TELEPHONE ENCOUNTER
Requested Prescriptions     Pending Prescriptions Disp Refills    tamsulosin (FLOMAX) 0.4 MG capsule [Pharmacy Med Name: Tamsulosin HCl Oral Capsule 0.4 MG] 30 capsule 0     Sig: TAKE 1 CAPSULE BY MOUTH EVERY DAY       Last Clinic Visit:  8/10/2022     Next Clinic Appointment:  12/8/2022

## 2022-12-05 DIAGNOSIS — I50.9 CONGESTIVE HEART FAILURE, UNSPECIFIED HF CHRONICITY, UNSPECIFIED HEART FAILURE TYPE (HCC): ICD-10-CM

## 2022-12-05 LAB
ALBUMIN SERPL-MCNC: 3.7 G/DL (ref 3.4–5)
ANION GAP SERPL CALCULATED.3IONS-SCNC: 10 MMOL/L (ref 3–16)
BASOPHILS ABSOLUTE: 0 K/UL (ref 0–0.2)
BASOPHILS RELATIVE PERCENT: 0.5 %
BUN BLDV-MCNC: 12 MG/DL (ref 7–20)
CALCIUM SERPL-MCNC: 9.5 MG/DL (ref 8.3–10.6)
CHLORIDE BLD-SCNC: 100 MMOL/L (ref 99–110)
CO2: 31 MMOL/L (ref 21–32)
CREAT SERPL-MCNC: 0.8 MG/DL (ref 0.8–1.3)
CREATININE URINE: 184.4 MG/DL (ref 39–259)
EOSINOPHILS ABSOLUTE: 0.1 K/UL (ref 0–0.6)
EOSINOPHILS RELATIVE PERCENT: 2.7 %
GFR SERPL CREATININE-BSD FRML MDRD: >60 ML/MIN/{1.73_M2}
GLUCOSE BLD-MCNC: 94 MG/DL (ref 70–99)
HCT VFR BLD CALC: 35.6 % (ref 40.5–52.5)
HEMOGLOBIN: 11.4 G/DL (ref 13.5–17.5)
LYMPHOCYTES ABSOLUTE: 0.9 K/UL (ref 1–5.1)
LYMPHOCYTES RELATIVE PERCENT: 21.7 %
MCH RBC QN AUTO: 28.4 PG (ref 26–34)
MCHC RBC AUTO-ENTMCNC: 32 G/DL (ref 31–36)
MCV RBC AUTO: 88.8 FL (ref 80–100)
MICROALBUMIN UR-MCNC: 4.8 MG/DL
MICROALBUMIN/CREAT UR-RTO: 26 MG/G (ref 0–30)
MONOCYTES ABSOLUTE: 0.4 K/UL (ref 0–1.3)
MONOCYTES RELATIVE PERCENT: 9.1 %
NEUTROPHILS ABSOLUTE: 2.7 K/UL (ref 1.7–7.7)
NEUTROPHILS RELATIVE PERCENT: 66 %
PDW BLD-RTO: 15.7 % (ref 12.4–15.4)
PHOSPHORUS: 4.2 MG/DL (ref 2.5–4.9)
PLATELET # BLD: 380 K/UL (ref 135–450)
PMV BLD AUTO: 7.5 FL (ref 5–10.5)
POTASSIUM SERPL-SCNC: 4.4 MMOL/L (ref 3.5–5.1)
PRO-BNP: 174 PG/ML (ref 0–124)
RBC # BLD: 4.01 M/UL (ref 4.2–5.9)
SODIUM BLD-SCNC: 141 MMOL/L (ref 136–145)
WBC # BLD: 4.1 K/UL (ref 4–11)

## 2022-12-08 ENCOUNTER — OFFICE VISIT (OUTPATIENT)
Dept: INTERNAL MEDICINE CLINIC | Age: 72
End: 2022-12-08
Payer: MEDICARE

## 2022-12-08 VITALS
BODY MASS INDEX: 26.66 KG/M2 | WEIGHT: 165.9 LBS | TEMPERATURE: 96.6 F | DIASTOLIC BLOOD PRESSURE: 71 MMHG | SYSTOLIC BLOOD PRESSURE: 152 MMHG | HEART RATE: 64 BPM | HEIGHT: 66 IN | RESPIRATION RATE: 20 BRPM | OXYGEN SATURATION: 98 %

## 2022-12-08 DIAGNOSIS — I50.32 CHRONIC HEART FAILURE WITH PRESERVED EJECTION FRACTION (HCC): ICD-10-CM

## 2022-12-08 DIAGNOSIS — I10 PRIMARY HYPERTENSION: ICD-10-CM

## 2022-12-08 DIAGNOSIS — C61 PROSTATE CANCER (HCC): ICD-10-CM

## 2022-12-08 DIAGNOSIS — Z23 NEEDS FLU SHOT: ICD-10-CM

## 2022-12-08 PROCEDURE — 6360000002 HC RX W HCPCS

## 2022-12-08 PROCEDURE — G0008 ADMIN INFLUENZA VIRUS VAC: HCPCS

## 2022-12-08 PROCEDURE — 90694 VACC AIIV4 NO PRSRV 0.5ML IM: CPT

## 2022-12-08 PROCEDURE — 99213 OFFICE O/P EST LOW 20 MIN: CPT

## 2022-12-08 RX ORDER — LISINOPRIL 10 MG/1
10 TABLET ORAL DAILY
Qty: 90 TABLET | Refills: 1 | Status: SHIPPED | OUTPATIENT
Start: 2022-12-08

## 2022-12-08 RX ORDER — FUROSEMIDE 20 MG/1
20 TABLET ORAL PRN
Qty: 180 TABLET | Refills: 2 | Status: SHIPPED | OUTPATIENT
Start: 2022-12-08

## 2022-12-08 RX ADMIN — INFLUENZA A VIRUS A/VICTORIA/2570/2019 IVR-215 (H1N1) ANTIGEN (FORMALDEHYDE INACTIVATED), INFLUENZA A VIRUS A/DARWIN/6/2021 IVR-227 (H3N2) ANTIGEN (FORMALDEHYDE INACTIVATED), INFLUENZA B VIRUS B/AUSTRIA/1359417/2021 BVR-26 ANTIGEN (FORMALDEHYDE INACTIVATED), INFLUENZA B VIRUS B/PHUKET/3073/2013 BVR-1B ANTIGEN (FORMALDEHYDE INACTIVATED) 0.5 ML: 15; 15; 15; 15 INJECTION, SUSPENSION INTRAMUSCULAR at 08:57

## 2022-12-08 ASSESSMENT — PATIENT HEALTH QUESTIONNAIRE - PHQ9
SUM OF ALL RESPONSES TO PHQ QUESTIONS 1-9: 0
1. LITTLE INTEREST OR PLEASURE IN DOING THINGS: 0
SUM OF ALL RESPONSES TO PHQ QUESTIONS 1-9: 0
SUM OF ALL RESPONSES TO PHQ QUESTIONS 1-9: 0
2. FEELING DOWN, DEPRESSED OR HOPELESS: 0
SUM OF ALL RESPONSES TO PHQ9 QUESTIONS 1 & 2: 0
SUM OF ALL RESPONSES TO PHQ QUESTIONS 1-9: 0

## 2022-12-08 ASSESSMENT — ENCOUNTER SYMPTOMS
GASTROINTESTINAL NEGATIVE: 1
RESPIRATORY NEGATIVE: 1
EYES NEGATIVE: 1

## 2022-12-08 NOTE — PATIENT INSTRUCTIONS
Please come back in 1 month. Please stop taking the lasix ( water pill) everyday and just use it if you notice your legs are swollen daily until swelling goes down. Please take the blood pressure medication daily.

## 2022-12-08 NOTE — PROGRESS NOTES
[facility] Outpatient Internal Medicine [facility]    Leeanne Zamora is a 67 y.o. male, here for evaluation of the following concerns:    HPI  Pt is a 66year old male with hx of advanced metastatic prostate cancer , HFpEF who comes in for office visit. Patient has no complaints on this visit. Patient endorses having to urinate at nighttime every 2 hours. Patient does endorse drinking more tea because of his healthy. Patient also takes Lasix in the mornings due to his lower extremity swellings in the past.  Patient denies any shortness of breath, lower extremity swelling, chest pain, headache, diarrhea, or any sick contacts. Patient denies any orthopnea or leg is able to walk more than 3 blocks without feeling shortness of breath. He is taking his medications as prescribed. He follows up with nephrology. He has a urology appointment coming up soon. Patient does not smoke, does not endorse drinking alcohol or any illicit drug use. Patient used to check his blood pressures at home when she found her to be higher than the 150s at times. Patient has also gained weight since his last visit and endorses that is due to eating more sweets. Review of Systems   Constitutional: Negative. HENT: Negative. Eyes: Negative. Respiratory: Negative. Cardiovascular: Negative. Gastrointestinal: Negative. Genitourinary:  Positive for frequency. Musculoskeletal: Negative. Neurological: Negative. All other systems reviewed and are negative. MEDICATIONS:  Prior to Visit Medications    Medication Sig Taking?  Authorizing Provider   tamsulosin (FLOMAX) 0.4 MG capsule TAKE 1 CAPSULE BY MOUTH EVERY DAY Yes Les Yadav MD   Calcium Carb-Cholecalciferol 600-500 MG-UNIT CAPS Take by mouth Yes Historical Provider, MD   pantoprazole (PROTONIX) 40 MG tablet Take 1 tablet by mouth every morning (before breakfast) Yes Autumn Castellano MD   furosemide (LASIX) 20 MG tablet Take 2 tablets by mouth daily Yes Sarah Goodson MD   FERROUS GLUCONATE PO Take by mouth every other day Yes Historical Provider, MD   acetaminophen (TYLENOL) 500 MG tablet Take 500 mg by mouth every 6 hours as needed for Pain Yes Historical Provider, MD        Vitals:    12/08/22 0819   BP: (!) 152/71   Site: Right Upper Arm   Position: Sitting   Cuff Size: Large Adult   Pulse: 64   Resp: 20   Temp: (!) 96.6 °F (35.9 °C)   TempSrc: Temporal   SpO2: 98%   Weight: 165 lb 14.4 oz (75.3 kg)   Height: 5' 6\" (1.676 m)      Estimated body mass index is 26.78 kg/m² as calculated from the following:    Height as of this encounter: 5' 6\" (1.676 m). Weight as of this encounter: 165 lb 14.4 oz (75.3 kg). Physical Exam  Constitutional:       Appearance: Normal appearance. HENT:      Head: Normocephalic and atraumatic. Nose: Nose normal.      Mouth/Throat:      Pharynx: Oropharynx is clear. Eyes:      Extraocular Movements: Extraocular movements intact. Conjunctiva/sclera: Conjunctivae normal.      Pupils: Pupils are equal, round, and reactive to light. Cardiovascular:      Rate and Rhythm: Normal rate and regular rhythm. Pulses: Normal pulses. Heart sounds: Normal heart sounds. Pulmonary:      Effort: Pulmonary effort is normal.      Breath sounds: Normal breath sounds. Abdominal:      General: Abdomen is flat. Bowel sounds are normal.      Palpations: Abdomen is soft. Musculoskeletal:      Cervical back: Normal range of motion. Right lower leg: No edema. Left lower leg: No edema. Skin:     General: Skin is warm. Capillary Refill: Capillary refill takes less than 2 seconds. Neurological:      General: No focal deficit present. Mental Status: He is alert and oriented to person, place, and time. Mental status is at baseline. Psychiatric:         Mood and Affect: Mood normal.       ASSESSMENT/PLAN:     1.  Prostate cancer [facility])  Patient was diagnosed with prostate cancer back in October of last year. Patient has since then gotten radiation and resection. Patient denies any symptoms at this time. No dysuria no hematuria. Patient does endorse having urinary frequency at nighttime but also endorses drinking fluids before sleeping such as tea. Patient has urology follow-up in a few weeks. 2. Chronic heart failure with preserved ejection fraction (HCC)  Recent echo showed EF of 55 to 60% with indeterminant diastolic. Patient denies any orthopnea, dyspnea on exertion, shortness of breath, chest pain, or any other cardiac symptoms at this time. Patient was on Lasix for lower extremity swelling at the time when he had obstructive AQUILINO possible sepsis. Will stop Lasix and added lisinopril for blood pressure management due to urine studies showing microalbuminemia.    -Lasix 20 mg as needed.  -Lisinopril 10 mg daily    3. Primary hypertension  Systolic blood pressure in clinic and and other visits have shown elevated blood pressures higher than 150s. Patient does not complain about any headaches or chest pain. We will add in lisinopril for blood pressure management. 4. Needs flu shot  -     influenza A&B vaccine (FLUAD QUADRIVALENT) injection 0.5 mL; 0.5 mL, IntraMUSCular, ONCE, 1 dose, On Thu 12/8/22 at 0915If not given at scheduled administration time, reschedule for next day. Return in about 4 weeks (around 1/5/2023). The patient was staffed with the teaching attending: Dr. Francisco Waters. An electronic signature was used to authenticate this note.     --Marcia Martinez MD

## 2022-12-19 DIAGNOSIS — N40.1 BENIGN PROSTATIC HYPERPLASIA WITH URINARY FREQUENCY: ICD-10-CM

## 2022-12-19 DIAGNOSIS — R35.0 BENIGN PROSTATIC HYPERPLASIA WITH URINARY FREQUENCY: ICD-10-CM

## 2022-12-19 RX ORDER — TAMSULOSIN HYDROCHLORIDE 0.4 MG/1
CAPSULE ORAL
Qty: 30 CAPSULE | Refills: 0 | Status: SHIPPED | OUTPATIENT
Start: 2022-12-19 | End: 2023-01-17

## 2022-12-19 NOTE — TELEPHONE ENCOUNTER
Requested Prescriptions     Pending Prescriptions Disp Refills    tamsulosin (FLOMAX) 0.4 MG capsule [Pharmacy Med Name: Tamsulosin HCl Oral Capsule 0.4 MG] 30 capsule 0     Sig: TAKE 1 CAPSULE BY MOUTH EVERY DAY       Last Clinic Visit:  12/8/2022     Next Clinic Appointment:  1/11/2023

## 2023-01-11 ENCOUNTER — OFFICE VISIT (OUTPATIENT)
Dept: INTERNAL MEDICINE CLINIC | Age: 73
End: 2023-01-11
Payer: MEDICARE

## 2023-01-11 VITALS
DIASTOLIC BLOOD PRESSURE: 81 MMHG | WEIGHT: 170.3 LBS | BODY MASS INDEX: 27.37 KG/M2 | SYSTOLIC BLOOD PRESSURE: 162 MMHG | TEMPERATURE: 98 F | HEIGHT: 66 IN | HEART RATE: 60 BPM | OXYGEN SATURATION: 98 %

## 2023-01-11 DIAGNOSIS — I10 PRIMARY HYPERTENSION: Primary | ICD-10-CM

## 2023-01-11 DIAGNOSIS — C61 PROSTATE CANCER (HCC): ICD-10-CM

## 2023-01-11 PROCEDURE — 99213 OFFICE O/P EST LOW 20 MIN: CPT

## 2023-01-11 ASSESSMENT — ENCOUNTER SYMPTOMS
GASTROINTESTINAL NEGATIVE: 1
EYES NEGATIVE: 1
RESPIRATORY NEGATIVE: 1

## 2023-01-11 NOTE — PROGRESS NOTES
Outpatient Clinic Established Patient Note    Patient: Emily Mcnamara  : [id number] (67 y.o.)  Date: 2023    CC: 3 month follow up     HPI:    Mr. Sayra Singleton is a 50-year-old male with a past medical history of metastatic prostate cancer s/p treatment in 2022 and is currently in remission and hypertension who presents to the clinic today for a 3-month routine follow-up. Patient states that at his last visit he was told to put his furosemide to the site and has not been taking it. My last note says to continue furosemide 20 mg daily. In office today his blood pressure was elevated at 162/81. He has no other complaints at this time. He denies any fever, chills, nausea, vomiting, shortness of breath, or chest pain. Home Meds:  Prior to Visit Medications    Medication Sig Taking? Authorizing Provider   tamsulosin (FLOMAX) 0.4 MG capsule TAKE 1 CAPSULE BY MOUTH EVERY DAY Yes Ashleigh Reese MD   lisinopril (PRINIVIL;ZESTRIL) 10 MG tablet Take 1 tablet by mouth daily Yes Jamari Byrnes MD   furosemide (LASIX) 20 MG tablet Take 1 tablet by mouth as needed (if the lower extremities are swollen take daily until swelling goes down) Yes Jamari Byrnes MD   Calcium Carb-Cholecalciferol 600-500 MG-UNIT CAPS Take by mouth Yes Historical Provider, MD   pantoprazole (PROTONIX) 40 MG tablet Take 1 tablet by mouth every morning (before breakfast) Yes Zoran Pompa MD   FERROUS GLUCONATE PO Take by mouth every other day Yes Historical Provider, MD   acetaminophen (TYLENOL) 500 MG tablet Take 500 mg by mouth every 6 hours as needed for Pain Yes Historical Provider, MD       Allergies:     Other    Health Maintenance Due   Topic Date Due    Shingles vaccine (1 of 2) Never done    Annual Wellness Visit (AWV)  Never done    COVID-19 Vaccine (3 - Pfizer risk series) 2021       Immunization History   Administered Date(s) Administered    COVID-19, MODERNA BLUE border, Primary or Immunocompromised, (age 12y+), IM, 100 mcg/0.5mL 11/29/2021    COVID-19, MODERNA Booster BLUE border, (age 18y+), IM, 50mcg/0.25mL 11/29/2021    COVID-19, PFIZER PURPLE top, DILUTE for use, (age 15 y+), 30mcg/0.3mL 02/28/2021, 03/21/2021    Influenza, FLUAD, (age 72 y+), Adjuvanted, 0.5mL 12/08/2022    Pneumococcal Polysaccharide ([id number]) 11/22/2021    Pneumococcal conjugate PCV20, PF (Prevnar 20) 08/10/2022    Tdap (Boostrix, Adacel) 11/08/2021       Review of Systems   Constitutional: Negative. HENT: Negative. Eyes: Negative. Respiratory: Negative. Cardiovascular: Negative. Gastrointestinal: Negative. Genitourinary: Negative. Musculoskeletal: Negative. Skin: Negative. Neurological: Negative. Psychiatric/Behavioral: Negative. Data: Old records have been reviewed electronically. PHYSICAL EXAM:  BP (!) 162/81 (Site: Left Upper Arm, Position: Sitting, Cuff Size: Medium Adult)   Pulse 60   Temp 98 °F (36.7 °C) (Temporal)   Ht 5' 6\" (1.676 m)   Wt 170 lb 4.8 oz (77.2 kg)   SpO2 98%   BMI 27.49 kg/m²   Physical Exam  Vitals reviewed. Constitutional:       Appearance: Normal appearance. He is normal weight. HENT:      Head: Normocephalic and atraumatic. Eyes:      Extraocular Movements: Extraocular movements intact. Conjunctiva/sclera: Conjunctivae normal.      Pupils: Pupils are equal, round, and reactive to light. Cardiovascular:      Rate and Rhythm: Normal rate and regular rhythm. Heart sounds: Normal heart sounds. Pulmonary:      Effort: Pulmonary effort is normal.      Breath sounds: Normal breath sounds. Abdominal:      General: Abdomen is flat. Bowel sounds are normal.      Palpations: Abdomen is soft. Musculoskeletal:         General: Normal range of motion. Right lower leg: No edema. Left lower leg: No edema. Skin:     General: Skin is warm and dry. Neurological:      General: No focal deficit present.       Mental Status: He is alert and oriented to person, place, and time. Mental status is at baseline. Psychiatric:         Mood and Affect: Mood normal.         Behavior: Behavior normal.       Assessment & Plan:    Hypertension  -Patient had not been taking his Lasix as he thought he was told to stop taking at his last visit  -We will restart him on Lasix 20 mg daily  -Continue lisinopril 10 mg  -Encouraged patient to check his blood pressure 1-2 times a week and keep a log and bring in his next visit    Lower extremity edema  -Currently no edema in his lower extremities    Metastatic prostate cancer  -Currently in remission  -Continue Flomax 0.4 mg daily    GERD  -Well controlled  -Continue Protonix       Return in about 3 months (around 4/11/2023) for F/u BP.     Dispo: Pt has been staffed with Dr. Naya Cormier MD.  _______________  Chantelle De La Cruz DO, 1/11/2023 8:47 AM   PGY-1

## 2023-01-11 NOTE — PATIENT INSTRUCTIONS
Resume taking your lasix 20mg every day in the morning. Try to check your blood pressure at home 1-2 times a week and keep a log. Follow up in three months.

## 2023-01-17 DIAGNOSIS — N40.1 BENIGN PROSTATIC HYPERPLASIA WITH URINARY FREQUENCY: ICD-10-CM

## 2023-01-17 DIAGNOSIS — R35.0 BENIGN PROSTATIC HYPERPLASIA WITH URINARY FREQUENCY: ICD-10-CM

## 2023-01-17 RX ORDER — TAMSULOSIN HYDROCHLORIDE 0.4 MG/1
CAPSULE ORAL
Qty: 30 CAPSULE | Refills: 3 | Status: SHIPPED | OUTPATIENT
Start: 2023-01-17

## 2023-01-17 NOTE — TELEPHONE ENCOUNTER
Requested Prescriptions     Pending Prescriptions Disp Refills    tamsulosin (FLOMAX) 0.4 MG capsule [Pharmacy Med Name: Tamsulosin HCl Oral Capsule 0.4 MG] 30 capsule 3     Sig: TAKE 1 CAPSULE BY MOUTH EVERY DAY       Last Clinic Visit:  1/11/2023     Next Clinic Appointment:  4/12/2023

## 2023-02-13 LAB — PROSTATE SPECIFIC ANTIGEN: 0.08 NG/ML (ref 0–4)

## 2023-02-15 LAB — TESTOSTERONE TOTAL: 5 NG/DL (ref 220–1000)

## 2023-04-12 PROBLEM — I10 HTN (HYPERTENSION): Status: ACTIVE | Noted: 2023-04-12

## 2023-04-17 LAB — PSA SERPL DL<=0.01 NG/ML-MCNC: 0.06 NG/ML (ref 0–4)

## 2023-05-01 RX ORDER — LISINOPRIL 10 MG/1
TABLET ORAL
Qty: 90 TABLET | Refills: 0 | Status: SHIPPED | OUTPATIENT
Start: 2023-05-01

## 2023-05-15 DIAGNOSIS — N40.1 BENIGN PROSTATIC HYPERPLASIA WITH URINARY FREQUENCY: ICD-10-CM

## 2023-05-15 DIAGNOSIS — R35.0 BENIGN PROSTATIC HYPERPLASIA WITH URINARY FREQUENCY: ICD-10-CM

## 2023-05-15 RX ORDER — TAMSULOSIN HYDROCHLORIDE 0.4 MG/1
CAPSULE ORAL
Qty: 90 CAPSULE | Refills: 1 | Status: SHIPPED | OUTPATIENT
Start: 2023-05-15

## 2023-05-15 NOTE — TELEPHONE ENCOUNTER
Requested Prescriptions     Pending Prescriptions Disp Refills    tamsulosin (FLOMAX) 0.4 MG capsule [Pharmacy Med Name: Tamsulosin HCl Oral Capsule 0.4 MG] 90 capsule 1     Sig: TAKE 1 CAPSULE BY MOUTH EVERY DAY       Last Clinic Visit:  4/12/2023     Next Clinic Appointment:  8/16/2023

## 2023-08-16 ENCOUNTER — OFFICE VISIT (OUTPATIENT)
Dept: INTERNAL MEDICINE CLINIC | Age: 73
End: 2023-08-16
Payer: MEDICARE

## 2023-08-16 VITALS
TEMPERATURE: 97.2 F | HEIGHT: 66 IN | BODY MASS INDEX: 26.02 KG/M2 | DIASTOLIC BLOOD PRESSURE: 83 MMHG | HEART RATE: 55 BPM | SYSTOLIC BLOOD PRESSURE: 165 MMHG | OXYGEN SATURATION: 97 % | RESPIRATION RATE: 16 BRPM | WEIGHT: 161.9 LBS

## 2023-08-16 DIAGNOSIS — K21.9 GASTROESOPHAGEAL REFLUX DISEASE WITHOUT ESOPHAGITIS: ICD-10-CM

## 2023-08-16 DIAGNOSIS — D64.89 ANEMIA DUE TO OTHER CAUSE, NOT CLASSIFIED: ICD-10-CM

## 2023-08-16 DIAGNOSIS — C79.82 METASTATIC ADENOCARCINOMA TO PROSTATE (HCC): ICD-10-CM

## 2023-08-16 DIAGNOSIS — I10 HYPERTENSION, UNSPECIFIED TYPE: Primary | ICD-10-CM

## 2023-08-16 PROBLEM — H61.23 BILATERAL IMPACTED CERUMEN: Status: RESOLVED | Noted: 2022-05-11 | Resolved: 2023-08-16

## 2023-08-16 PROCEDURE — 99213 OFFICE O/P EST LOW 20 MIN: CPT | Performed by: STUDENT IN AN ORGANIZED HEALTH CARE EDUCATION/TRAINING PROGRAM

## 2023-08-16 ASSESSMENT — ENCOUNTER SYMPTOMS: ABDOMINAL PAIN: 0

## 2023-08-16 NOTE — ASSESSMENT & PLAN NOTE
Repeat /80, Pt takes lasix prn for leg swelling cannot recall the the last time he took it. Follows with Dr. Medina Healy  Pt did not take lisinopril today. Previous BP log was good.  Cont current regiment

## 2023-08-16 NOTE — PROGRESS NOTES
[facility]    Prabhakar Beth is a 75 y.o. male, here for evaluation of the following concerns:    HPI  78-year-old male with history of HTN, metastatic prostate cancer status post radiation, who presents today for regular follow-up. Patient states he has no complaints at this time. Patient is taking his medications as prescribed. He states he has no recent illnesses. States that his prescription for pantoprazole  and he is not sure if he is to continue taking it. Review of Systems   Constitutional:  Negative for activity change, chills, fatigue and fever. Gastrointestinal:  Negative for abdominal pain. Genitourinary:  Negative for difficulty urinating. Neurological:  Negative for dizziness and light-headedness. MEDICATIONS:  Prior to Visit Medications    Medication Sig Taking?  Authorizing Provider   tamsulosin (FLOMAX) 0.4 MG capsule TAKE 1 CAPSULE BY Ishmael Luther MD   lisinopril (PRINIVIL;ZESTRIL) 10 MG tablet TAKE 1 TABLET BY MOUTH EVERY DAY  Janett Villafana MD   furosemide (LASIX) 20 MG tablet Take 1 tablet by mouth as needed (if the lower extremities are swollen take daily until swelling goes down)  Patient taking differently: Take 1 tablet by mouth as needed (if the lower extremities are swollen take daily until swelling goes down) He has been taking daily  Marielos Minor MD   Calcium Carb-Cholecalciferol 600-500 MG-UNIT CAPS Take by mouth  Historical Provider, MD   pantoprazole (PROTONIX) 40 MG tablet Take 1 tablet by mouth every morning (before breakfast)  Basia Gonsales MD   FERROUS GLUCONATE PO Take by mouth every other day  Historical Provider, MD   acetaminophen (TYLENOL) 500 MG tablet Take 1 tablet by mouth every 6 hours as needed for Pain  Historical Provider, MD        Vitals:    23 0810 23 0812   BP: (!) 153/82 (!) 165/83   Site: Left Upper Arm Right Upper Arm   Position: Sitting Sitting   Cuff Size:

## 2023-08-16 NOTE — ASSESSMENT & PLAN NOTE
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 Given that his thrombocytopenia has resolved and his anemia is very mild I would recommend a watch and wait approach with no further therapy at this time. He will start oral Fe supple. \"  - will do yearly CBC

## 2023-08-16 NOTE — PATIENT INSTRUCTIONS
Your blood pressure was slightly elevated in the clinic, please make sure to take your BP medicine as prescribed  Please continue taking all other medications as prescribed. Regarding your pantoprazole, please contact your Gastroenterologist Dr. Erum Joyce to see if they want you to continue this medication.   Continue to follow up with your Urologist  Follow-up in 3 months

## 2023-08-16 NOTE — ASSESSMENT & PLAN NOTE
Pt had EGD 2022: previously seen esophagitis, GE junction ulcer healed completely, at that time recommended to cont PPI daily  - rx , pt not having symptoms will defer to GI if pt is to cont pantoprazole

## 2023-08-21 LAB — PSA SERPL DL<=0.01 NG/ML-MCNC: 0.03 NG/ML (ref 0–4)

## 2023-08-23 LAB — TESTOST SERPL-MCNC: <3 NG/DL (ref 220–1000)

## 2023-08-28 RX ORDER — LISINOPRIL 10 MG/1
TABLET ORAL
Qty: 90 TABLET | Refills: 1 | Status: SHIPPED | OUTPATIENT
Start: 2023-08-28

## 2023-08-28 NOTE — TELEPHONE ENCOUNTER
Requested Prescriptions     Pending Prescriptions Disp Refills    lisinopril (PRINIVIL;ZESTRIL) 10 MG tablet [Pharmacy Med Name: Lisinopril Oral Tablet 10 MG] 90 tablet 1     Sig: TAKE 1 TABLET BY MOUTH EVERY DAY       Last Clinic Visit:  8/16/2023     Next Clinic Appointment:  11/22/2023

## 2023-11-22 ENCOUNTER — OFFICE VISIT (OUTPATIENT)
Dept: INTERNAL MEDICINE CLINIC | Age: 73
End: 2023-11-22
Payer: MEDICARE

## 2023-11-22 VITALS
TEMPERATURE: 98.2 F | OXYGEN SATURATION: 98 % | HEIGHT: 66 IN | WEIGHT: 164.6 LBS | DIASTOLIC BLOOD PRESSURE: 80 MMHG | BODY MASS INDEX: 26.45 KG/M2 | SYSTOLIC BLOOD PRESSURE: 152 MMHG | HEART RATE: 61 BPM | RESPIRATION RATE: 20 BRPM

## 2023-11-22 DIAGNOSIS — I10 HYPERTENSION, UNSPECIFIED TYPE: Primary | ICD-10-CM

## 2023-11-22 DIAGNOSIS — C79.82 METASTATIC ADENOCARCINOMA TO PROSTATE (HCC): ICD-10-CM

## 2023-11-22 DIAGNOSIS — K21.9 GASTROESOPHAGEAL REFLUX DISEASE WITHOUT ESOPHAGITIS: ICD-10-CM

## 2023-11-22 PROCEDURE — 99213 OFFICE O/P EST LOW 20 MIN: CPT

## 2023-11-22 ASSESSMENT — ENCOUNTER SYMPTOMS
GASTROINTESTINAL NEGATIVE: 1
EYES NEGATIVE: 1
RESPIRATORY NEGATIVE: 1

## 2023-11-22 NOTE — PROGRESS NOTES
Outpatient Clinic Established Patient Note    Patient: Jayda Mohan  : [birth year] (75 y.o.)  Date: 2023    CC: HTN follow up    HPI:    Mr. Delmis Hilliard is a 65-year-old male with past medical history of hypertension, prostate cancer status postradiation, and BPH who presents to the clinic today for 3-month follow-up on his hypertension. Patient states that when he checks his blood pressure at home his systolics are usually in the 120-130s. [facility] and his PSA level was less than 1. He has no complaints at this visit and states that he is doing very well. He denies any fever, chills, nausea, vomiting, abdominal pain, diarrhea, shortness of breath, or chest pain. Home Meds:  Prior to Visit Medications    Medication Sig Taking? Authorizing Provider   lisinopril (PRINIVIL;ZESTRIL) 10 MG tablet TAKE 1 TABLET BY MOUTH EVERY DAY Yes Donta Jacobsen MD   tamsulosin (FLOMAX) 0.4 MG capsule TAKE 1 CAPSULE BY MOUTH EVERY DAY Yes Sung Corbett MD   furosemide (LASIX) 20 MG tablet Take 1 tablet by mouth as needed (if the lower extremities are swollen take daily until swelling goes down)  Patient taking differently: Take 1 tablet by mouth as needed (if the lower extremities are swollen take daily until swelling goes down) He has been taking daily Yes Jakub Ray MD   Calcium Carb-Cholecalciferol 600-500 MG-UNIT CAPS Take by mouth Yes Alba Francis MD   pantoprazole (PROTONIX) 40 MG tablet Take 1 tablet by mouth every morning (before breakfast) Yes Unknown MD Emanuel   acetaminophen (TYLENOL) 500 MG tablet Take 1 tablet by mouth every 6 hours as needed for Pain Yes Alba Francis MD   FERROUS GLUCONATE PO Take by mouth every other day  Patient not taking: Reported on 2023  Alba Francis MD       Allergies:     Other    Health Maintenance Due   Topic Date Due    Shingles vaccine (1 of 2) Never done    Annual Wellness Visit

## 2023-12-04 DIAGNOSIS — I50.9 CONGESTIVE HEART FAILURE, UNSPECIFIED HF CHRONICITY, UNSPECIFIED HEART FAILURE TYPE (HCC): ICD-10-CM

## 2023-12-04 LAB
ALBUMIN SERPL-MCNC: 4 G/DL (ref 3.4–5)
ANION GAP SERPL CALCULATED.3IONS-SCNC: 8 MMOL/L (ref 3–16)
BUN SERPL-MCNC: 18 MG/DL (ref 7–20)
CALCIUM SERPL-MCNC: 9 MG/DL (ref 8.3–10.6)
CHLORIDE SERPL-SCNC: 100 MMOL/L (ref 99–110)
CO2 SERPL-SCNC: 28 MMOL/L (ref 21–32)
CREAT SERPL-MCNC: 0.9 MG/DL (ref 0.8–1.3)
CREAT UR-MCNC: 74.3 MG/DL (ref 39–259)
GFR SERPLBLD CREATININE-BSD FMLA CKD-EPI: >60 ML/MIN/{1.73_M2}
GLUCOSE SERPL-MCNC: 94 MG/DL (ref 70–99)
MICROALBUMIN UR DL<=1MG/L-MCNC: <1.2 MG/DL
MICROALBUMIN/CREAT UR: NORMAL MG/G (ref 0–30)
NT-PROBNP SERPL-MCNC: 245 PG/ML (ref 0–449)
PHOSPHATE SERPL-MCNC: 3.9 MG/DL (ref 2.5–4.9)
POTASSIUM SERPL-SCNC: 4.6 MMOL/L (ref 3.5–5.1)
SODIUM SERPL-SCNC: 136 MMOL/L (ref 136–145)

## 2024-02-05 NOTE — PROGRESS NOTES
Outpatient Clinic Established Patient Note    Patient: Dalton Gama  : 1950 (73 y.o.)  Date: 2024    CC: 3-month follow up      HPI: Dalton Gama is a 73 y.o. 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    Last visit with our clinic was 2023 with Dr. Maharaj at which time they discussed his hypertension. His BP that day was 152/80. He was instructed to keep a blood pressure log. His BP log shows an SBP range from 100s-130s with most readings in the 110-120s. In the office today his BP machine is well-calibrated to ours.    He has no other acute complaints today, states he has been doing well. One of his neighbors recently had to go to the hospital via ambulance and he is worried about how she's doing.     Still follows with [facility] for his prostate cancer, last visit 2023 with undetectable PSA levels.     Home Meds:  Prior to Visit Medications    Medication Sig Taking? Authorizing Provider   furosemide (LASIX) 20 MG tablet TAKE 1 TABLET BY MOUTH DAILY IF LOWER EXTREMITIES ARE SWOLLEN UNTIL SWELLING GOES DOWN Yes Chavo Godoy MD   lisinopril (PRINIVIL;ZESTRIL) 10 MG tablet TAKE 1 TABLET BY MOUTH EVERY DAY Yes John Jacobsen MD   tamsulosin (FLOMAX) 0.4 MG capsule TAKE 1 CAPSULE BY MOUTH EVERY DAY Yes Naida Casarez MD   Calcium Carb-Cholecalciferol 600-500 MG-UNIT CAPS Take by mouth Yes Alba Francis MD   pantoprazole (PROTONIX) 40 MG tablet Take 1 tablet by mouth every morning (before breakfast) Yes Amaya Spears MD   acetaminophen (TYLENOL) 500 MG tablet Take 1 tablet by mouth every 6 hours as needed for Pain Yes Alba Francis MD       Allergies:    Other    Health Maintenance Due   Topic Date Due    Shingles vaccine (1 of 2) Never done

## 2024-02-06 ENCOUNTER — OFFICE VISIT (OUTPATIENT)
Dept: INTERNAL MEDICINE CLINIC | Age: 74
End: 2024-02-06
Payer: MEDICARE

## 2024-02-06 VITALS
TEMPERATURE: 97.4 F | HEART RATE: 64 BPM | SYSTOLIC BLOOD PRESSURE: 159 MMHG | OXYGEN SATURATION: 98 % | BODY MASS INDEX: 27.89 KG/M2 | DIASTOLIC BLOOD PRESSURE: 90 MMHG | WEIGHT: 172.8 LBS | RESPIRATION RATE: 16 BRPM

## 2024-02-06 DIAGNOSIS — C79.82 METASTATIC ADENOCARCINOMA TO PROSTATE (HCC): ICD-10-CM

## 2024-02-06 DIAGNOSIS — I10 HYPERTENSION, UNSPECIFIED TYPE: Primary | ICD-10-CM

## 2024-02-06 DIAGNOSIS — K21.9 GASTROESOPHAGEAL REFLUX DISEASE WITHOUT ESOPHAGITIS: ICD-10-CM

## 2024-02-06 DIAGNOSIS — Z00.00 ROUTINE HEALTH MAINTENANCE: ICD-10-CM

## 2024-02-06 PROCEDURE — 99213 OFFICE O/P EST LOW 20 MIN: CPT

## 2024-02-06 ASSESSMENT — ENCOUNTER SYMPTOMS
ABDOMINAL PAIN: 0
PHOTOPHOBIA: 0
VOMITING: 0
SHORTNESS OF BREATH: 0
NAUSEA: 0
EYE REDNESS: 0
CHOKING: 0
WHEEZING: 0

## 2024-02-06 ASSESSMENT — PATIENT HEALTH QUESTIONNAIRE - PHQ9
1. LITTLE INTEREST OR PLEASURE IN DOING THINGS: 0
2. FEELING DOWN, DEPRESSED OR HOPELESS: 0
SUM OF ALL RESPONSES TO PHQ QUESTIONS 1-9: 0
SUM OF ALL RESPONSES TO PHQ9 QUESTIONS 1 & 2: 0
SUM OF ALL RESPONSES TO PHQ QUESTIONS 1-9: 0

## 2024-02-06 NOTE — PATIENT INSTRUCTIONS
Please continue taking all your medications as prescribed. I ordered a complete blood count and a cholesterol blood test for you to have drawn prior to your next visit. Please come back and see us in 3 months. Thank you for visiting the [facility] clinic, I hope you have a great rest of your day!

## 2024-02-16 RX ORDER — PANTOPRAZOLE SODIUM 40 MG/1
40 TABLET, DELAYED RELEASE ORAL
Qty: 30 TABLET | Refills: 5 | Status: SHIPPED | OUTPATIENT
Start: 2024-02-16

## 2024-02-16 NOTE — TELEPHONE ENCOUNTER
Requested Prescriptions     Pending Prescriptions Disp Refills    pantoprazole (PROTONIX) 40 MG tablet 30 tablet 5     Sig: Take 1 tablet by mouth every morning (before breakfast)       Last Clinic Visit:  2/6/2024     Next Clinic Appointment:  5/13/2024

## 2024-02-26 LAB — PSA SERPL DL<=0.01 NG/ML-MCNC: 0.02 NG/ML (ref 0–4)

## 2024-02-28 LAB — TESTOST SERPL-MCNC: <3 NG/DL (ref 220–1000)

## 2024-03-04 RX ORDER — LISINOPRIL 10 MG/1
10 TABLET ORAL DAILY
Qty: 90 TABLET | Refills: 1 | Status: SHIPPED | OUTPATIENT
Start: 2024-03-04

## 2024-03-04 NOTE — TELEPHONE ENCOUNTER
PT STATED HE NEED REFILL ON LISINOPRIL. PLEASE SEND TO [facility] LISTED ON  PROFILE. PT CAN BE REACHED [phone]

## 2024-03-04 NOTE — TELEPHONE ENCOUNTER
Requested Prescriptions     Pending Prescriptions Disp Refills    lisinopril (PRINIVIL;ZESTRIL) 10 MG tablet 90 tablet 1     Sig: Take 1 tablet by mouth daily       Last Clinic Visit:  2/6/2024     Next Clinic Appointment:  5/13/2024

## 2024-03-07 PROBLEM — Z00.00 ROUTINE HEALTH MAINTENANCE: Status: RESOLVED | Noted: 2024-02-06 | Resolved: 2024-03-07

## 2024-03-14 RX ORDER — FUROSEMIDE 20 MG/1
TABLET ORAL
Qty: 180 TABLET | Refills: 0 | Status: SHIPPED | OUTPATIENT
Start: 2024-03-14

## 2024-03-14 NOTE — TELEPHONE ENCOUNTER
Requested Prescriptions     Pending Prescriptions Disp Refills    furosemide (LASIX) 20 MG tablet 180 tablet 0     Sig: TAKE 1 TABLET BY MOUTH DAILY IF LOWER EXTREMITIES ARE SWOLLEN UNTIL SWELLING GOES DOWN       Last Clinic Visit:  2/6/2024     Next Clinic Appointment:  5/13/2024

## 2024-05-06 DIAGNOSIS — Z00.00 ROUTINE HEALTH MAINTENANCE: ICD-10-CM

## 2024-05-06 LAB
BASOPHILS # BLD: 0 K/UL (ref 0–0.2)
BASOPHILS NFR BLD: 0.7 %
CHOLEST SERPL-MCNC: 212 MG/DL (ref 0–199)
DEPRECATED RDW RBC AUTO: 14.1 % (ref 12.4–15.4)
EOSINOPHIL # BLD: 0.1 K/UL (ref 0–0.6)
EOSINOPHIL NFR BLD: 1.5 %
HCT VFR BLD AUTO: 36.9 % (ref 40.5–52.5)
HDLC SERPL-MCNC: 49 MG/DL (ref 40–60)
HGB BLD-MCNC: 12.4 G/DL (ref 13.5–17.5)
LDLC SERPL CALC-MCNC: 134 MG/DL
LYMPHOCYTES # BLD: 1 K/UL (ref 1–5.1)
LYMPHOCYTES NFR BLD: 19.9 %
MCH RBC QN AUTO: 29.9 PG (ref 26–34)
MCHC RBC AUTO-ENTMCNC: 33.6 G/DL (ref 31–36)
MCV RBC AUTO: 89 FL (ref 80–100)
MONOCYTES # BLD: 0.3 K/UL (ref 0–1.3)
MONOCYTES NFR BLD: 6.6 %
NEUTROPHILS # BLD: 3.6 K/UL (ref 1.7–7.7)
NEUTROPHILS NFR BLD: 71.3 %
PLATELET # BLD AUTO: 341 K/UL (ref 135–450)
PMV BLD AUTO: 7.5 FL (ref 5–10.5)
RBC # BLD AUTO: 4.15 M/UL (ref 4.2–5.9)
TRIGL SERPL-MCNC: 145 MG/DL (ref 0–150)
VLDLC SERPL CALC-MCNC: 29 MG/DL
WBC # BLD AUTO: 5.1 K/UL (ref 4–11)

## 2024-05-13 ENCOUNTER — OFFICE VISIT (OUTPATIENT)
Dept: INTERNAL MEDICINE CLINIC | Age: 74
End: 2024-05-13
Payer: MEDICARE

## 2024-05-13 VITALS
OXYGEN SATURATION: 97 % | WEIGHT: 176.5 LBS | DIASTOLIC BLOOD PRESSURE: 75 MMHG | HEIGHT: 66 IN | RESPIRATION RATE: 20 BRPM | HEART RATE: 61 BPM | BODY MASS INDEX: 28.37 KG/M2 | TEMPERATURE: 97.3 F | SYSTOLIC BLOOD PRESSURE: 131 MMHG

## 2024-05-13 DIAGNOSIS — I10 HYPERTENSION, UNSPECIFIED TYPE: Primary | ICD-10-CM

## 2024-05-13 DIAGNOSIS — K21.9 GASTROESOPHAGEAL REFLUX DISEASE WITHOUT ESOPHAGITIS: ICD-10-CM

## 2024-05-13 DIAGNOSIS — N40.1 BENIGN PROSTATIC HYPERPLASIA WITH URINARY FREQUENCY: ICD-10-CM

## 2024-05-13 DIAGNOSIS — E78.5 DYSLIPIDEMIA: ICD-10-CM

## 2024-05-13 DIAGNOSIS — R35.0 BENIGN PROSTATIC HYPERPLASIA WITH URINARY FREQUENCY: ICD-10-CM

## 2024-05-13 DIAGNOSIS — C79.82 METASTATIC ADENOCARCINOMA TO PROSTATE (HCC): ICD-10-CM

## 2024-05-13 PROCEDURE — 99213 OFFICE O/P EST LOW 20 MIN: CPT

## 2024-05-13 RX ORDER — PANTOPRAZOLE SODIUM 40 MG/1
40 TABLET, DELAYED RELEASE ORAL
Qty: 30 TABLET | Refills: 5 | Status: SHIPPED | OUTPATIENT
Start: 2024-05-13

## 2024-05-13 RX ORDER — ROSUVASTATIN CALCIUM 20 MG/1
20 TABLET, COATED ORAL DAILY
Qty: 90 TABLET | Refills: 2 | Status: SHIPPED | OUTPATIENT
Start: 2024-05-13

## 2024-05-13 RX ORDER — TAMSULOSIN HYDROCHLORIDE 0.4 MG/1
CAPSULE ORAL
Qty: 30 CAPSULE | Refills: 5 | Status: SHIPPED | OUTPATIENT
Start: 2024-05-13

## 2024-05-13 ASSESSMENT — ENCOUNTER SYMPTOMS
PHOTOPHOBIA: 0
ABDOMINAL PAIN: 0
DIARRHEA: 0
VOMITING: 0
SHORTNESS OF BREATH: 0
BLOOD IN STOOL: 0
WHEEZING: 0
SORE THROAT: 0
COUGH: 0
CONSTIPATION: 0
NAUSEA: 0

## 2024-05-13 NOTE — PATIENT INSTRUCTIONS
We are starting you on a new medication, rosuvastatin (Crestor), to help improve your cholesterol. Please start taking this every day, one 20 mg tablet. We would also like you to check a couple of labs that we have ordered, CK and liver function labs.    Please come back to see us in 3 months for a follow up visit. Thank you coming to see us today, and have a great rest of your week!

## 2024-05-13 NOTE — PROGRESS NOTES
Outpatient Clinic Established Patient Note    Patient: Dalton Gama  : 1950 (73 y.o.)  Date: 2024    CC: 3-month follow up      HPI: Dalton Gama is a 73 y.o. 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    Last visit with our clinic was 2024 with me, we discussed his HTN, BP readings at home in the 110s/50s - 130s/80s, continued his lisinopril. Also had him check a lipid panel and CBC.    Today he states he feels well, had an upper tooth pulled by his dentist recently but is eating solid foods and tolerating them well. He denies any acute complaints today, states he had to stop and wait for a turkey to cross the road on the drive in this morning.     Home Meds:  Prior to Visit Medications    Medication Sig Taking? Authorizing Provider   pantoprazole (PROTONIX) 40 MG tablet Take 1 tablet by mouth every morning (before breakfast) Yes Angel Conrad DO   tamsulosin (FLOMAX) 0.4 MG capsule TAKE 1 CAPSULE BY MOUTH EVERY DAY Yes Angel Conrad DO   rosuvastatin (CRESTOR) 20 MG tablet Take 1 tablet by mouth daily Yes Angel Conrad DO   furosemide (LASIX) 20 MG tablet TAKE 1 TABLET BY MOUTH DAILY IF LOWER EXTREMITIES ARE SWOLLEN UNTIL SWELLING GOES DOWN Yes Redd Saba MD   lisinopril (PRINIVIL;ZESTRIL) 10 MG tablet Take 1 tablet by mouth daily Yes Cris Miller MD   Calcium Carb-Cholecalciferol 600-500 MG-UNIT CAPS Take by mouth Yes Alba Francis MD   acetaminophen (TYLENOL) 500 MG tablet Take 1 tablet by mouth every 6 hours as needed for Pain Yes ProviderAlba MD       Allergies:    Other    Health Maintenance Due   Topic Date Due    Shingles vaccine (1 of 2) Never done    Respiratory Syncytial Virus (RSV) Pregnant or

## 2024-07-01 LAB — PSA SERPL DL<=0.01 NG/ML-MCNC: <0.01 NG/ML (ref 0–4)

## 2024-07-03 LAB — TESTOST SERPL-MCNC: 5 NG/DL (ref 193–740)

## 2024-07-08 NOTE — PROGRESS NOTES
Pre-Op Examination     Opal Christianson                                               : [birth year]  Age: 66 y.o. MRN: [id number]  Date : 2021    Referring Physician: Dr. Bart Santiago    Procedure: TRUS Bx of Prostate    HISTORY OF PRESENT ILLNESS:   The patient is a 66 y.o. male who presents for preoperative examination. Pt was seen for a routine follow up and physical. PSA was checked after joint decision  making to screen for prostate cancer, which results elevated at 84.20. He was referred to Dr. Carmen [facility] who is going to perform a prostate bx on 22. Pt reports he feels well and denies F/C, HA, tremors, diaphoresis, dizziness, CP, SOB, palpitations, abd pain, N/V/D/C, melena, BRBPR, extremity weakness and acute urinary sx. Planned anesthesia: General  Known anesthesia problems: None  Bleeding risk: Low. Pt does not take any AC/AP, NSAIDs, ASA or blood thinning medication  Personal or FH of DVT/PE: None  Patient objection to receiving blood products: No      Past Medical History:        Diagnosis Date    Asthma     Environmental allergies     Inguinal hernia        Past Surgical History:        Procedure Laterality Date    INGUINAL HERNIA REPAIR Bilateral 2019    LAPAROSCOPIC BILATERAL INGUINAL HERNIA REPAIR WITH MESH performed by Trisha Berrios MD at [address] [place] [address]        Family History:   No family history on file. Social History:   TOBACCO:   reports that he has never smoked. He has never used smokeless tobacco.  ETOH:   reports current alcohol use. OCCUPATION:      Allergies: Other    Current Medications:    Prior to Admission medications    Medication Sig Start Date End Date Taking?  Authorizing Provider   tamsulosin (FLOMAX) 0.4 MG capsule Take 1 capsule by mouth daily 21 Yes Millie Lipscomb MD   lisinopril-hydroCHLOROthiazide VARGAS [facility]) 20-12.5 MG per tablet Take 1 tablet by mouth daily 21  Yes Millie Lipscomb MD Loratadine-Pseudoephedrine (CLARITIN-D 12 HOUR PO) Take by mouth  Patient not taking: Reported on 12/20/2021    Historical Provider, MD   Blood Pressure KIT 1 kit by Does not apply route 2 times daily  Patient not taking: Reported on 12/20/2021 11/8/21   Eugenia Ndiaye MD       REVIEW OF SYSTEMS:  Review of Systems  As noted in HPI above    Physical Exam:      Vitals: BP (!) 161/74 (Site: Left Upper Arm, Position: Sitting, Cuff Size: Small Adult)   Pulse 63   Temp 97.2 °F (36.2 °C) (Temporal)   Ht 5' 6\" (1.676 m)   Wt 155 lb (70.3 kg)   SpO2 99%   BMI 25.02 kg/m²     Body mass index is 25.02 kg/m². Wt Readings from Last 3 Encounters:   12/20/21 155 lb (70.3 kg)   11/22/21 151 lb 1.6 oz (68.5 kg)   11/08/21 150 lb 9.6 oz (68.3 kg)     Physical Exam  Constitutional:       General: He is not in acute distress. Appearance: Normal appearance. He is not ill-appearing. HENT:      Head: Normocephalic and atraumatic. Eyes:      General: Vision grossly intact. Conjunctiva/sclera: Conjunctivae normal.   Cardiovascular:      Rate and Rhythm: Normal rate and regular rhythm. Pulses: Normal pulses. Heart sounds: Normal heart sounds, S1 normal and S2 normal. No murmur heard. No friction rub. No gallop. Pulmonary:      Effort: Pulmonary effort is normal. No respiratory distress. Breath sounds: Normal breath sounds and air entry. No wheezing or rales. Abdominal:      General: Bowel sounds are normal. There is no distension. Palpations: Abdomen is soft. Tenderness: There is no abdominal tenderness. Musculoskeletal:         General: Normal range of motion. Cervical back: Full passive range of motion without pain, normal range of motion and neck supple. Right lower leg: No edema. Left lower leg: No edema. Lymphadenopathy:      Cervical: No cervical adenopathy. Skin:     Capillary Refill: Capillary refill takes less than 2 seconds.       Coloration: Skin is not Applied Female

## 2024-08-05 DIAGNOSIS — E78.5 DYSLIPIDEMIA: ICD-10-CM

## 2024-08-05 LAB
ALBUMIN SERPL-MCNC: 4.1 G/DL (ref 3.4–5)
ALP SERPL-CCNC: 96 U/L (ref 40–129)
ALT SERPL-CCNC: 19 U/L (ref 10–40)
AST SERPL-CCNC: 23 U/L (ref 15–37)
BILIRUB DIRECT SERPL-MCNC: <0.2 MG/DL (ref 0–0.3)
BILIRUB INDIRECT SERPL-MCNC: ABNORMAL MG/DL (ref 0–1)
BILIRUB SERPL-MCNC: 0.3 MG/DL (ref 0–1)
CK SERPL-CCNC: 161 U/L (ref 39–308)
PROT SERPL-MCNC: 6.3 G/DL (ref 6.4–8.2)

## 2024-08-13 ENCOUNTER — OFFICE VISIT (OUTPATIENT)
Dept: INTERNAL MEDICINE CLINIC | Age: 74
End: 2024-08-13
Payer: MEDICARE

## 2024-08-13 VITALS
RESPIRATION RATE: 18 BRPM | TEMPERATURE: 97.3 F | BODY MASS INDEX: 26.95 KG/M2 | SYSTOLIC BLOOD PRESSURE: 120 MMHG | DIASTOLIC BLOOD PRESSURE: 68 MMHG | OXYGEN SATURATION: 99 % | WEIGHT: 167 LBS | HEART RATE: 53 BPM

## 2024-08-13 DIAGNOSIS — E78.5 DYSLIPIDEMIA: ICD-10-CM

## 2024-08-13 DIAGNOSIS — K21.9 GASTROESOPHAGEAL REFLUX DISEASE WITHOUT ESOPHAGITIS: ICD-10-CM

## 2024-08-13 DIAGNOSIS — I10 HYPERTENSION, UNSPECIFIED TYPE: Primary | ICD-10-CM

## 2024-08-13 PROCEDURE — 99213 OFFICE O/P EST LOW 20 MIN: CPT

## 2024-08-13 NOTE — PROGRESS NOTES
[facility]    Dalton Gama is a 74 y.o. male, here for evaluation of the following concerns described below:    HPI  Patient is here for routine health maintenance visit.  He denies any headache, blurry vision, shortness of breath, chest pain, nausea, vomiting, abdominal pain, constipation, diarrhea, or dysuria.  He reports good flow with his urinary stream.  He reports having good energy levels.  He has no complaints at this time.       Review of Systems - A 10 point review of systems was conducted and significant findings noted in HPI.    MEDICATIONS:  Prior to Visit Medications    Medication Sig Taking? Authorizing Provider   pantoprazole (PROTONIX) 40 MG tablet Take 1 tablet by mouth every morning (before breakfast) Yes Angel Conrad DO   tamsulosin (FLOMAX) 0.4 MG capsule TAKE 1 CAPSULE BY MOUTH EVERY DAY Yes Angel Conrad DO   rosuvastatin (CRESTOR) 20 MG tablet Take 1 tablet by mouth daily Yes Angel Conrad DO   furosemide (LASIX) 20 MG tablet TAKE 1 TABLET BY MOUTH DAILY IF LOWER EXTREMITIES ARE SWOLLEN UNTIL SWELLING GOES DOWN Yes Redd Saba MD   lisinopril (PRINIVIL;ZESTRIL) 10 MG tablet Take 1 tablet by mouth daily Yes Cris Miller MD   Calcium Carb-Cholecalciferol 600-500 MG-UNIT CAPS Take by mouth  Patient not taking: Reported on 8/13/2024  ProviderAlba MD        Vitals:    08/13/24 0841   BP: 120/68   Site: Left Upper Arm   Position: Sitting   Cuff Size: Medium Adult   Pulse: 53   Resp: 18   Temp: 97.3 °F (36.3 °C)   TempSrc: Temporal   SpO2: 99%   Weight: 75.8 kg (167 lb)      Estimated body mass index is 26.95 kg/m² as calculated from the following:    Height as of 5/13/24: 1.676 m (5' 6\").    Weight as of this encounter: 75.8 kg (167 lb).  Physical Exam  Constitutional:       General: He is not in acute distress.  HENT:      Head: Normocephalic and atraumatic.      Mouth/Throat:

## 2024-08-26 RX ORDER — LISINOPRIL 10 MG/1
10 TABLET ORAL DAILY
Qty: 90 TABLET | Refills: 1 | Status: SHIPPED | OUTPATIENT
Start: 2024-08-26

## 2024-09-11 RX ORDER — FUROSEMIDE 20 MG
TABLET ORAL
Qty: 180 TABLET | Refills: 0 | Status: SHIPPED | OUTPATIENT
Start: 2024-09-11

## 2024-10-28 LAB — PSA SERPL DL<=0.01 NG/ML-MCNC: <0.02 NG/ML (ref 0–4)

## 2024-10-30 LAB — TESTOST SERPL-MCNC: 60 NG/DL (ref 193–740)

## 2024-11-12 DIAGNOSIS — N40.1 BENIGN PROSTATIC HYPERPLASIA WITH URINARY FREQUENCY: ICD-10-CM

## 2024-11-12 DIAGNOSIS — R35.0 BENIGN PROSTATIC HYPERPLASIA WITH URINARY FREQUENCY: ICD-10-CM

## 2024-11-12 RX ORDER — PANTOPRAZOLE SODIUM 40 MG/1
40 TABLET, DELAYED RELEASE ORAL
Qty: 30 TABLET | Refills: 0 | Status: SHIPPED | OUTPATIENT
Start: 2024-11-12

## 2024-11-12 RX ORDER — TAMSULOSIN HYDROCHLORIDE 0.4 MG/1
CAPSULE ORAL
Qty: 30 CAPSULE | Refills: 0 | Status: SHIPPED | OUTPATIENT
Start: 2024-11-12

## 2024-11-12 NOTE — TELEPHONE ENCOUNTER
Requested Prescriptions     Pending Prescriptions Disp Refills    tamsulosin (FLOMAX) 0.4 MG capsule [Pharmacy Med Name: Tamsulosin HCl Oral Capsule 0.4 MG] 30 capsule 0     Sig: TAKE 1 CAPSULE BY MOUTH EVERY DAY    pantoprazole (PROTONIX) 40 MG tablet [Pharmacy Med Name: Pantoprazole Sodium Oral Tablet Delayed Release 40 MG] 30 tablet 0     Sig: TAKE 1 TABLET BY MOUTH IN THE MORNING BEFORE BREAKFAST       Last Clinic Visit:  8/13/2024     Next Clinic Appointment:  2/3/2025

## 2024-12-17 DIAGNOSIS — N40.1 BENIGN PROSTATIC HYPERPLASIA WITH URINARY FREQUENCY: ICD-10-CM

## 2024-12-17 DIAGNOSIS — R35.0 BENIGN PROSTATIC HYPERPLASIA WITH URINARY FREQUENCY: ICD-10-CM

## 2024-12-17 RX ORDER — TAMSULOSIN HYDROCHLORIDE 0.4 MG/1
CAPSULE ORAL
Qty: 30 CAPSULE | Refills: 0 | Status: SHIPPED | OUTPATIENT
Start: 2024-12-17 | End: 2025-01-14

## 2024-12-17 RX ORDER — PANTOPRAZOLE SODIUM 40 MG/1
40 TABLET, DELAYED RELEASE ORAL
Qty: 30 TABLET | Refills: 0 | Status: SHIPPED | OUTPATIENT
Start: 2024-12-17 | End: 2025-01-14

## 2025-01-14 DIAGNOSIS — N40.1 BENIGN PROSTATIC HYPERPLASIA WITH URINARY FREQUENCY: ICD-10-CM

## 2025-01-14 DIAGNOSIS — R35.0 BENIGN PROSTATIC HYPERPLASIA WITH URINARY FREQUENCY: ICD-10-CM

## 2025-01-14 RX ORDER — TAMSULOSIN HYDROCHLORIDE 0.4 MG/1
CAPSULE ORAL
Qty: 30 CAPSULE | Refills: 0 | Status: SHIPPED | OUTPATIENT
Start: 2025-01-14

## 2025-01-14 RX ORDER — PANTOPRAZOLE SODIUM 40 MG/1
40 TABLET, DELAYED RELEASE ORAL
Qty: 30 TABLET | Refills: 0 | Status: SHIPPED | OUTPATIENT
Start: 2025-01-14

## 2025-02-03 ENCOUNTER — OFFICE VISIT (OUTPATIENT)
Dept: INTERNAL MEDICINE CLINIC | Age: 75
End: 2025-02-03
Payer: MEDICARE

## 2025-02-03 VITALS
SYSTOLIC BLOOD PRESSURE: 130 MMHG | BODY MASS INDEX: 28.14 KG/M2 | WEIGHT: 175.1 LBS | TEMPERATURE: 98.1 F | OXYGEN SATURATION: 97 % | HEIGHT: 66 IN | DIASTOLIC BLOOD PRESSURE: 80 MMHG | HEART RATE: 60 BPM | RESPIRATION RATE: 16 BRPM

## 2025-02-03 DIAGNOSIS — E78.5 DYSLIPIDEMIA: ICD-10-CM

## 2025-02-03 DIAGNOSIS — C79.82 METASTATIC ADENOCARCINOMA TO PROSTATE (HCC): ICD-10-CM

## 2025-02-03 DIAGNOSIS — I10 HYPERTENSION, UNSPECIFIED TYPE: Primary | ICD-10-CM

## 2025-02-03 DIAGNOSIS — K21.9 GASTROESOPHAGEAL REFLUX DISEASE WITHOUT ESOPHAGITIS: ICD-10-CM

## 2025-02-03 PROCEDURE — 99213 OFFICE O/P EST LOW 20 MIN: CPT

## 2025-02-03 RX ORDER — FUROSEMIDE 20 MG/1
TABLET ORAL
Qty: 180 TABLET | Refills: 2 | Status: SHIPPED | OUTPATIENT
Start: 2025-02-03

## 2025-02-03 SDOH — ECONOMIC STABILITY: FOOD INSECURITY: WITHIN THE PAST 12 MONTHS, THE FOOD YOU BOUGHT JUST DIDN'T LAST AND YOU DIDN'T HAVE MONEY TO GET MORE.: NEVER TRUE

## 2025-02-03 SDOH — ECONOMIC STABILITY: FOOD INSECURITY: WITHIN THE PAST 12 MONTHS, YOU WORRIED THAT YOUR FOOD WOULD RUN OUT BEFORE YOU GOT MONEY TO BUY MORE.: NEVER TRUE

## 2025-02-03 ASSESSMENT — PATIENT HEALTH QUESTIONNAIRE - PHQ9
SUM OF ALL RESPONSES TO PHQ QUESTIONS 1-9: 0
SUM OF ALL RESPONSES TO PHQ QUESTIONS 1-9: 0
SUM OF ALL RESPONSES TO PHQ9 QUESTIONS 1 & 2: 0
2. FEELING DOWN, DEPRESSED OR HOPELESS: NOT AT ALL
1. LITTLE INTEREST OR PLEASURE IN DOING THINGS: NOT AT ALL
SUM OF ALL RESPONSES TO PHQ QUESTIONS 1-9: 0
SUM OF ALL RESPONSES TO PHQ QUESTIONS 1-9: 0

## 2025-02-03 NOTE — PATIENT INSTRUCTIONS
It sounds like you've been doing well since your last visit. We aren't making any changes to your medications today, continue taking these as prescribed. We would like you to have some blood work done before your next appointment in 3 months. You can have this collected the week before your appointment.    Thank you for coming in to see us today, have a great rest of your week!

## 2025-02-03 NOTE — PROGRESS NOTES
[employer] risk series) 12/27/2021    Annual Wellness Visit (Medicare)  Never done    Respiratory Syncytial Virus (RSV) Pregnant or age 60 yrs+ (1 - 1-dose 75+ series) Never done    Depression Screen  02/06/2025       Immunization History   Administered Date(s) Administered    COVID-19, MODERNA BLUE border, Primary or Immunocompromised, (age 12y+), IM, 100 mcg/0.5mL 11/29/2021    COVID-19, MODERNA Booster BLUE border, (age 18y+), IM, 50mcg/0.25mL 11/29/2021    Influenza, FLUAD, (age 65 y+), IM, Quadv, 0.5mL 12/08/2022    Pneumococcal, PCV20, PREVNAR 20, (age 6w+), IM, 0.5mL 08/10/2022    Pneumococcal, PPSV23, PNEUMOVAX 23, (age 2y+), SC/IM, 0.5mL 11/22/2021    TDaP, ADACEL (age 10y-64y), BOOSTRIX (age 10y+), IM, 0.5mL 11/08/2021       Data: Old records have been reviewed electronically.    PHYSICAL EXAM:  /80 (Site: Left Upper Arm, Position: Sitting, Cuff Size: Medium Adult)   Pulse 60   Temp 98.1 °F (36.7 °C) (Temporal)   Resp 16   Ht 1.676 m (5' 6\")   Wt 79.4 kg (175 lb 1.6 oz)   SpO2 97%   BMI 28.26 kg/m²   Physical Exam  Constitutional:       Appearance: Normal appearance.   HENT:      Head: Normocephalic and atraumatic.   Eyes:      Conjunctiva/sclera: Conjunctivae normal.      Pupils: Pupils are equal, round, and reactive to light.   Cardiovascular:      Rate and Rhythm: Normal rate.      Pulses: Normal pulses.      Heart sounds: No murmur heard.     No friction rub. No gallop.   Pulmonary:      Effort: Pulmonary effort is normal.      Breath sounds: Normal breath sounds. No wheezing, rhonchi or rales.   Musculoskeletal:      Right lower leg: No edema.      Left lower leg: No edema.   Skin:     General: Skin is warm and dry.   Neurological:      General: No focal deficit present.      Mental Status: He is oriented to person, place, and time.   Psychiatric:         Mood and Affect: Mood normal.         Thought Content: Thought content normal.         Assessment & Plan:      Dyslipidemia  ASCVD 10 year

## 2025-02-10 DIAGNOSIS — N40.1 BENIGN PROSTATIC HYPERPLASIA WITH URINARY FREQUENCY: ICD-10-CM

## 2025-02-10 DIAGNOSIS — R35.0 BENIGN PROSTATIC HYPERPLASIA WITH URINARY FREQUENCY: ICD-10-CM

## 2025-02-10 RX ORDER — PANTOPRAZOLE SODIUM 40 MG/1
40 TABLET, DELAYED RELEASE ORAL
Qty: 30 TABLET | Refills: 0 | Status: SHIPPED | OUTPATIENT
Start: 2025-02-10

## 2025-02-10 RX ORDER — TAMSULOSIN HYDROCHLORIDE 0.4 MG/1
CAPSULE ORAL
Qty: 30 CAPSULE | Refills: 0 | Status: SHIPPED | OUTPATIENT
Start: 2025-02-10

## 2025-02-10 RX ORDER — ROSUVASTATIN CALCIUM 20 MG/1
20 TABLET, COATED ORAL DAILY
Qty: 90 TABLET | Refills: 0 | Status: SHIPPED | OUTPATIENT
Start: 2025-02-10

## 2025-02-10 NOTE — TELEPHONE ENCOUNTER
Requested Prescriptions     Pending Prescriptions Disp Refills    rosuvastatin (CRESTOR) 20 MG tablet [Pharmacy Med Name: Rosuvastatin Calcium Oral Tablet 20 MG] 90 tablet 0     Sig: TAKE 1 TABLET BY MOUTH EVERY DAY       Last Clinic Visit:  2/3/2025     Next Clinic Appointment:  5/12/2025

## 2025-02-10 NOTE — TELEPHONE ENCOUNTER
Requested Prescriptions     Pending Prescriptions Disp Refills    tamsulosin (FLOMAX) 0.4 MG capsule [Pharmacy Med Name: Tamsulosin HCl Oral Capsule 0.4 MG] 30 capsule 0     Sig: TAKE 1 CAPSULE BY MOUTH EVERY DAY    pantoprazole (PROTONIX) 40 MG tablet [Pharmacy Med Name: Pantoprazole Sodium Oral Tablet Delayed Release 40 MG] 30 tablet 0     Sig: TAKE 1 TABLET BY MOUTH IN THE MORNING BEFORE BREAKFAST       Last Clinic Visit:  2/3/2025     Next Clinic Appointment:  2/10/2025

## 2025-03-03 RX ORDER — LISINOPRIL 10 MG/1
10 TABLET ORAL DAILY
Qty: 90 TABLET | Refills: 0 | Status: SHIPPED | OUTPATIENT
Start: 2025-03-03

## 2025-03-03 NOTE — TELEPHONE ENCOUNTER
Requested Prescriptions     Pending Prescriptions Disp Refills    lisinopril (PRINIVIL;ZESTRIL) 10 MG tablet [Pharmacy Med Name: Lisinopril Oral Tablet 10 MG] 90 tablet 0     Sig: Take 1 tablet by mouth daily       Last Clinic Visit:  2/3/2025     Next Clinic Appointment:  5/12/2025

## 2025-03-17 DIAGNOSIS — R35.0 BENIGN PROSTATIC HYPERPLASIA WITH URINARY FREQUENCY: ICD-10-CM

## 2025-03-17 DIAGNOSIS — N40.1 BENIGN PROSTATIC HYPERPLASIA WITH URINARY FREQUENCY: ICD-10-CM

## 2025-03-17 RX ORDER — PANTOPRAZOLE SODIUM 40 MG/1
40 TABLET, DELAYED RELEASE ORAL
Qty: 30 TABLET | Refills: 0 | Status: SHIPPED | OUTPATIENT
Start: 2025-03-17

## 2025-03-17 RX ORDER — TAMSULOSIN HYDROCHLORIDE 0.4 MG/1
CAPSULE ORAL DAILY
Qty: 30 CAPSULE | Refills: 0 | Status: SHIPPED | OUTPATIENT
Start: 2025-03-17

## 2025-03-17 NOTE — TELEPHONE ENCOUNTER
Requested Prescriptions     Pending Prescriptions Disp Refills    tamsulosin (FLOMAX) 0.4 MG capsule [Pharmacy Med Name: Tamsulosin HCl Oral Capsule 0.4 MG] 30 capsule 0     Sig: TAKE 1 CAPSULE BY MOUTH EVERY DAY    pantoprazole (PROTONIX) 40 MG tablet [Pharmacy Med Name: Pantoprazole Sodium Oral Tablet Delayed Release 40 MG] 30 tablet 0     Sig: TAKE 1 TABLET BY MOUTH IN THE MORNING BEFORE BREAKFAST       Last Clinic Visit:  2/3/2025     Next Clinic Appointment:  5/12/2025

## 2025-04-14 DIAGNOSIS — N40.1 BENIGN PROSTATIC HYPERPLASIA WITH URINARY FREQUENCY: ICD-10-CM

## 2025-04-14 DIAGNOSIS — R35.0 BENIGN PROSTATIC HYPERPLASIA WITH URINARY FREQUENCY: ICD-10-CM

## 2025-04-14 RX ORDER — PANTOPRAZOLE SODIUM 40 MG/1
TABLET, DELAYED RELEASE ORAL
Qty: 30 TABLET | Refills: 0 | Status: SHIPPED | OUTPATIENT
Start: 2025-04-14

## 2025-04-14 RX ORDER — TAMSULOSIN HYDROCHLORIDE 0.4 MG/1
CAPSULE ORAL DAILY
Qty: 30 CAPSULE | Refills: 0 | Status: SHIPPED | OUTPATIENT
Start: 2025-04-14

## 2025-04-14 NOTE — TELEPHONE ENCOUNTER
Requested Prescriptions     Pending Prescriptions Disp Refills    tamsulosin (FLOMAX) 0.4 MG capsule [Pharmacy Med Name: Tamsulosin HCl Oral Capsule 0.4 MG] 30 capsule 0     Sig: TAKE 1 CAPSULE BY MOUTH EVERY DAY    pantoprazole (PROTONIX) 40 MG tablet [Pharmacy Med Name: Pantoprazole Sodium Oral Tablet Delayed Release 40 MG] 30 tablet 0     Sig: TAKE 1 TABLET BY MOUTH EVERY DAY IN THE MORNING BEFORE BREAKFAST       Last Clinic Visit:  2/3/2025     Next Clinic Appointment:  5/12/2025

## 2025-04-28 LAB — PSA SERPL DL<=0.01 NG/ML-MCNC: 0.04 NG/ML (ref 0–4)

## 2025-04-30 LAB — TESTOST SERPL-MCNC: 211 NG/DL (ref 193–740)

## 2025-05-05 DIAGNOSIS — E78.5 DYSLIPIDEMIA: ICD-10-CM

## 2025-05-12 ENCOUNTER — OFFICE VISIT (OUTPATIENT)
Dept: INTERNAL MEDICINE CLINIC | Age: 75
End: 2025-05-12
Payer: MEDICARE

## 2025-05-12 VITALS
TEMPERATURE: 97.3 F | WEIGHT: 174.3 LBS | SYSTOLIC BLOOD PRESSURE: 126 MMHG | DIASTOLIC BLOOD PRESSURE: 70 MMHG | BODY MASS INDEX: 28.01 KG/M2 | HEIGHT: 66 IN | RESPIRATION RATE: 16 BRPM | OXYGEN SATURATION: 97 % | HEART RATE: 58 BPM

## 2025-05-12 DIAGNOSIS — K21.9 GASTROESOPHAGEAL REFLUX DISEASE WITHOUT ESOPHAGITIS: ICD-10-CM

## 2025-05-12 DIAGNOSIS — N40.1 BENIGN PROSTATIC HYPERPLASIA WITH URINARY FREQUENCY: ICD-10-CM

## 2025-05-12 DIAGNOSIS — R35.0 BENIGN PROSTATIC HYPERPLASIA WITH URINARY FREQUENCY: ICD-10-CM

## 2025-05-12 DIAGNOSIS — R60.0 PEDAL EDEMA: ICD-10-CM

## 2025-05-12 DIAGNOSIS — Z00.00 ROUTINE HEALTH MAINTENANCE: ICD-10-CM

## 2025-05-12 DIAGNOSIS — I10 HYPERTENSION, UNSPECIFIED TYPE: Primary | ICD-10-CM

## 2025-05-12 DIAGNOSIS — E78.5 DYSLIPIDEMIA: ICD-10-CM

## 2025-05-12 PROCEDURE — 99213 OFFICE O/P EST LOW 20 MIN: CPT

## 2025-05-12 RX ORDER — ROSUVASTATIN CALCIUM 20 MG/1
20 TABLET, COATED ORAL DAILY
Qty: 90 TABLET | Refills: 0 | Status: SHIPPED | OUTPATIENT
Start: 2025-05-12

## 2025-05-12 RX ORDER — TAMSULOSIN HYDROCHLORIDE 0.4 MG/1
0.4 CAPSULE ORAL DAILY
Qty: 30 CAPSULE | Refills: 0 | Status: SHIPPED | OUTPATIENT
Start: 2025-05-12

## 2025-05-12 RX ORDER — PANTOPRAZOLE SODIUM 40 MG/1
40 TABLET, DELAYED RELEASE ORAL DAILY
Qty: 30 TABLET | Refills: 3 | Status: SHIPPED | OUTPATIENT
Start: 2025-05-12

## 2025-05-12 RX ORDER — LISINOPRIL 10 MG/1
10 TABLET ORAL DAILY
Qty: 90 TABLET | Refills: 0 | Status: SHIPPED | OUTPATIENT
Start: 2025-05-12

## 2025-05-12 RX ORDER — FUROSEMIDE 20 MG/1
TABLET ORAL
Qty: 180 TABLET | Refills: 2 | Status: SHIPPED | OUTPATIENT
Start: 2025-05-12

## 2025-05-12 ASSESSMENT — ENCOUNTER SYMPTOMS
COUGH: 0
NAUSEA: 0
ABDOMINAL PAIN: 0
EYE PAIN: 0
DIARRHEA: 0
VOMITING: 0

## 2025-05-12 NOTE — PATIENT INSTRUCTIONS
Important Reminders:     You are doing very well!! Continue being active and working in your yard.  Happy early birthday!!!  Please continue to take your medication as prescribed.     You should seek immediate medical attention if:  You have a fever (greater than 100.4 degrees F),  You have chest pain, shortness of breath, abdominal pain, or uncontrollable vomiting,  You are unable to eat or drink,  You pass out,  You have difficulty moving your arms or legs,   You have difficulty speaking or slurred speech,  Or, you have any concern that you feel needs acute physician evaluation.

## 2025-05-12 NOTE — PROGRESS NOTES
[facility]    Dalton Gama is a 74 y.o. male with past medical history of HTN, ASHLEY, prostatic adenocarcinoma s/p s/p completion of hormonal therapy (Lupron 08/2023) and completion of radiation therapy (03/2022), GERD who presents for general follow-up visit.     He has no complaints or concerns today.  No recent chest pain, shortness of breath, headache, urinary complaints, or other concerns.  He recently saw his urologist who stated that he may require testosterone therapy. Dalton' energy has been good and he has been able to complete all of his ADLs without any difficulty.  He is also been working frequently in the yard, doing yard work and pulling heavy rocks out of an area near his yard.  He lives alone and completes all ADLs independently.    ASSESSMENT/PLAN:     1. Hypertension, unspecified type  -     furosemide (LASIX) 20 MG tablet; TAKE 1 TABLET BY MOUTH DAILY, Disp-180 tablet, R-2Normal  -     lisinopril (PRINIVIL;ZESTRIL) 10 MG tablet; Take 1 tablet by mouth daily, Disp-90 tablet, R-0Normal  2. Benign prostatic hyperplasia with urinary frequency  -     tamsulosin (FLOMAX) 0.4 MG capsule; Take 1 capsule by mouth daily, Disp-30 capsule, R-0Normal  3. Dyslipidemia  -     rosuvastatin (CRESTOR) 20 MG tablet; Take 1 tablet by mouth daily, Disp-90 tablet, R-0Normal  4. Gastroesophageal reflux disease without esophagitis  -     pantoprazole (PROTONIX) 40 MG tablet; Take 1 tablet by mouth daily, Disp-30 tablet, R-3Normal  5. Routine health maintenance  6. Pedal edema  -     furosemide (LASIX) 20 MG tablet; TAKE 1 TABLET BY MOUTH DAILY, Disp-180 tablet, R-2Normal    Holosystolic murmur  - likely 2/2 MR noted on previous echo from 2022 (EF 65%, no wall motion abnormalities, mild AV insufficiency, mild TR and MR)    - not symptomatic   - will follow  - no need for echo now     Plan:   - continuing all medications  - UTD on all screenings   - continue regular f/u with

## 2025-06-14 DIAGNOSIS — N40.1 BENIGN PROSTATIC HYPERPLASIA WITH URINARY FREQUENCY: ICD-10-CM

## 2025-06-14 DIAGNOSIS — R35.0 BENIGN PROSTATIC HYPERPLASIA WITH URINARY FREQUENCY: ICD-10-CM

## 2025-06-16 RX ORDER — TAMSULOSIN HYDROCHLORIDE 0.4 MG/1
CAPSULE ORAL DAILY
Qty: 30 CAPSULE | Refills: 0 | Status: SHIPPED | OUTPATIENT
Start: 2025-06-16

## 2025-06-16 NOTE — TELEPHONE ENCOUNTER
Requested Prescriptions     Pending Prescriptions Disp Refills    tamsulosin (FLOMAX) 0.4 MG capsule [Pharmacy Med Name: Tamsulosin HCl Oral Capsule 0.4 MG] 30 capsule 0     Sig: TAKE 1 CAPSULE BY MOUTH EVERY DAY       Last Clinic Visit:  5/12/2025     Next Clinic Appointment:  Visit date not found

## 2025-07-14 DIAGNOSIS — R35.0 BENIGN PROSTATIC HYPERPLASIA WITH URINARY FREQUENCY: ICD-10-CM

## 2025-07-14 DIAGNOSIS — N40.1 BENIGN PROSTATIC HYPERPLASIA WITH URINARY FREQUENCY: ICD-10-CM

## 2025-07-14 RX ORDER — TAMSULOSIN HYDROCHLORIDE 0.4 MG/1
CAPSULE ORAL DAILY
Qty: 30 CAPSULE | Refills: 0 | Status: SHIPPED | OUTPATIENT
Start: 2025-07-14

## 2025-08-12 DIAGNOSIS — R35.0 BENIGN PROSTATIC HYPERPLASIA WITH URINARY FREQUENCY: ICD-10-CM

## 2025-08-12 DIAGNOSIS — E78.5 DYSLIPIDEMIA: ICD-10-CM

## 2025-08-12 DIAGNOSIS — N40.1 BENIGN PROSTATIC HYPERPLASIA WITH URINARY FREQUENCY: ICD-10-CM

## 2025-08-12 RX ORDER — TAMSULOSIN HYDROCHLORIDE 0.4 MG/1
CAPSULE ORAL DAILY
Qty: 30 CAPSULE | Refills: 0 | Status: SHIPPED | OUTPATIENT
Start: 2025-08-12

## 2025-08-12 RX ORDER — ROSUVASTATIN CALCIUM 20 MG/1
20 TABLET, COATED ORAL DAILY
Qty: 90 TABLET | Refills: 0 | Status: SHIPPED | OUTPATIENT
Start: 2025-08-12

## 2025-09-01 DIAGNOSIS — I10 HYPERTENSION, UNSPECIFIED TYPE: ICD-10-CM

## 2025-09-02 RX ORDER — LISINOPRIL 10 MG/1
10 TABLET ORAL DAILY
Qty: 90 TABLET | Refills: 0 | Status: SHIPPED | OUTPATIENT
Start: 2025-09-02

## (undated) DEVICE — KIT,ANTI FOG,W/SPONGE & FLUID,SOFT PACK: Brand: MEDLINE

## (undated) DEVICE — MEDICINE CUP, GRADUATED, STER: Brand: MEDLINE

## (undated) DEVICE — SKIN AFFIX SURG ADHESIVE 72/CS 0.55ML: Brand: MEDLINE

## (undated) DEVICE — COVER LT HNDL BLU PLAS

## (undated) DEVICE — GARMENT,MEDLINE,DVT,INT,CALF,MED, GEN2: Brand: MEDLINE

## (undated) DEVICE — BOWL MED L 32OZ PLAS W/ MOLD GRAD EZ OPN PEEL PCH

## (undated) DEVICE — APPLICATOR PREP 26ML 0.7% IOD POVACRYLEX 74% ISO ALC ST

## (undated) DEVICE — SYSTEM PERM FIX L37CM 15 FAST CAPSUR

## (undated) DEVICE — SUTURE MCRYL SZ 4-0 L18IN ABSRB UD L19MM PS-2 3/8 CIR PRIM Y496G

## (undated) DEVICE — CANNULA SAMP CO2 AD GRN 7FT CO2 AND 7FT O2 TBNG UNIV CONN

## (undated) DEVICE — TROCARS: Brand: KII® BALLOON BLUNT TIP SYSTEM

## (undated) DEVICE — TURNOVER KIT RM INF CTRL TECH

## (undated) DEVICE — Z CONVERTED USE 2271043 CONTAINER SPEC COLL 4OZ SCR ON LID PEEL PCH

## (undated) DEVICE — GLOVE SURG SZ 7 L12IN FNGR THK75MIL WHT LTX POLYMER BEAD

## (undated) DEVICE — SUTURE VCRL SZ 0 L27IN ABSRB UD L26MM CT-2 1/2 CIR J270H

## (undated) DEVICE — FORCEPS BX L240CM JAW DIA2.8MM L CAP W/ NDL MIC MESH TOOTH

## (undated) DEVICE — SURGICAL SET UP - SURE SET: Brand: MEDLINE INDUSTRIES, INC.

## (undated) DEVICE — TROCAR: Brand: KII SHIELDED BLADED ACCESS SYSTEM

## (undated) DEVICE — CORD ES L10FT MPLR LAP

## (undated) DEVICE — PLATE ES AD W 9FT CRD 2

## (undated) DEVICE — [HIGH FLOW INSUFFLATOR,  DO NOT USE IF PACKAGE IS DAMAGED,  KEEP DRY,  KEEP AWAY FROM SUNLIGHT,  PROTECT FROM HEAT AND RADIOACTIVE SOURCES.]: Brand: PNEUMOSURE

## (undated) DEVICE — STANDARD HYPODERMIC NEEDLE,POLYPROPYLENE HUB: Brand: MONOJECT

## (undated) DEVICE — DRAPE,LAP,CHOLE,W/TROUGHS,STERILE: Brand: MEDLINE